# Patient Record
Sex: MALE | Race: WHITE | NOT HISPANIC OR LATINO | Employment: FULL TIME | URBAN - NONMETROPOLITAN AREA
[De-identification: names, ages, dates, MRNs, and addresses within clinical notes are randomized per-mention and may not be internally consistent; named-entity substitution may affect disease eponyms.]

---

## 2017-06-11 ENCOUNTER — APPOINTMENT (OUTPATIENT)
Dept: GENERAL RADIOLOGY | Facility: HOSPITAL | Age: 32
End: 2017-06-11

## 2017-06-11 ENCOUNTER — HOSPITAL ENCOUNTER (EMERGENCY)
Facility: HOSPITAL | Age: 32
Discharge: HOME | End: 2017-06-11
Attending: EMERGENCY MEDICINE | Admitting: EMERGENCY MEDICINE

## 2017-06-11 VITALS
WEIGHT: 285 LBS | TEMPERATURE: 97.7 F | BODY MASS INDEX: 38.6 KG/M2 | RESPIRATION RATE: 18 BRPM | DIASTOLIC BLOOD PRESSURE: 68 MMHG | HEART RATE: 64 BPM | OXYGEN SATURATION: 100 % | HEIGHT: 72 IN | SYSTOLIC BLOOD PRESSURE: 116 MMHG

## 2017-06-11 DIAGNOSIS — R07.9 CHEST PAIN, UNSPECIFIED TYPE: Primary | ICD-10-CM

## 2017-06-11 LAB
ALBUMIN SERPL-MCNC: 4.5 G/DL (ref 3.5–5)
ALBUMIN/GLOB SERPL: 1.2 G/DL (ref 1.5–2.5)
ALP SERPL-CCNC: 77 U/L (ref 40–129)
ALT SERPL W P-5'-P-CCNC: 51 U/L (ref 10–44)
AMYLASE SERPL-CCNC: 40 U/L (ref 28–100)
ANION GAP SERPL CALCULATED.3IONS-SCNC: 4 MMOL/L (ref 3.6–11.2)
AST SERPL-CCNC: 55 U/L (ref 10–34)
BASOPHILS # BLD AUTO: 0.07 10*3/MM3 (ref 0–0.3)
BASOPHILS NFR BLD AUTO: 0.9 % (ref 0–2)
BILIRUB SERPL-MCNC: 1.4 MG/DL (ref 0.2–1.8)
BILIRUB UR QL STRIP: NEGATIVE
BUN BLD-MCNC: 14 MG/DL (ref 7–21)
BUN/CREAT SERPL: 11.7 (ref 7–25)
CALCIUM SPEC-SCNC: 9.8 MG/DL (ref 7.7–10)
CHLORIDE SERPL-SCNC: 107 MMOL/L (ref 99–112)
CK MB SERPL-CCNC: 4.5 NG/ML (ref 0–5)
CK MB SERPL-CCNC: 5.1 NG/ML (ref 0–5)
CK MB SERPL-RTO: 1.6 % (ref 0–3)
CK MB SERPL-RTO: 1.8 % (ref 0–3)
CK SERPL-CCNC: 253 U/L (ref 24–204)
CK SERPL-CCNC: 324 U/L (ref 24–204)
CLARITY UR: CLEAR
CO2 SERPL-SCNC: 30 MMOL/L (ref 24.3–31.9)
COLOR UR: YELLOW
CREAT BLD-MCNC: 1.2 MG/DL (ref 0.43–1.29)
DEPRECATED RDW RBC AUTO: 42.1 FL (ref 37–54)
EOSINOPHIL # BLD AUTO: 0.19 10*3/MM3 (ref 0–0.7)
EOSINOPHIL NFR BLD AUTO: 2.4 % (ref 0–5)
ERYTHROCYTE [DISTWIDTH] IN BLOOD BY AUTOMATED COUNT: 13.1 % (ref 11.5–14.5)
GFR SERPL CREATININE-BSD FRML MDRD: 70 ML/MIN/1.73
GLOBULIN UR ELPH-MCNC: 3.8 GM/DL
GLUCOSE BLD-MCNC: 98 MG/DL (ref 70–110)
GLUCOSE UR STRIP-MCNC: NEGATIVE MG/DL
HCT VFR BLD AUTO: 42.7 % (ref 42–52)
HGB BLD-MCNC: 14.7 G/DL (ref 14–18)
HGB UR QL STRIP.AUTO: NEGATIVE
IMM GRANULOCYTES # BLD: 0.01 10*3/MM3 (ref 0–0.03)
IMM GRANULOCYTES NFR BLD: 0.1 % (ref 0–0.5)
KETONES UR QL STRIP: NEGATIVE
LEUKOCYTE ESTERASE UR QL STRIP.AUTO: NEGATIVE
LIPASE SERPL-CCNC: 45 U/L (ref 13–60)
LYMPHOCYTES # BLD AUTO: 2.04 10*3/MM3 (ref 1–3)
LYMPHOCYTES NFR BLD AUTO: 26.1 % (ref 21–51)
MCH RBC QN AUTO: 31 PG (ref 27–33)
MCHC RBC AUTO-ENTMCNC: 34.4 G/DL (ref 33–37)
MCV RBC AUTO: 90.1 FL (ref 80–94)
MONOCYTES # BLD AUTO: 0.8 10*3/MM3 (ref 0.1–0.9)
MONOCYTES NFR BLD AUTO: 10.2 % (ref 0–10)
MYOGLOBIN SERPL-MCNC: 55 NG/ML (ref 0–109)
MYOGLOBIN SERPL-MCNC: 79 NG/ML (ref 0–109)
NEUTROPHILS # BLD AUTO: 4.72 10*3/MM3 (ref 1.4–6.5)
NEUTROPHILS NFR BLD AUTO: 60.3 % (ref 30–70)
NITRITE UR QL STRIP: NEGATIVE
OSMOLALITY SERPL CALC.SUM OF ELEC: 281.7 MOSM/KG (ref 273–305)
PH UR STRIP.AUTO: 6.5 [PH] (ref 5–8)
PLATELET # BLD AUTO: 229 10*3/MM3 (ref 130–400)
PMV BLD AUTO: 11.4 FL (ref 6–10)
POTASSIUM BLD-SCNC: 4.8 MMOL/L (ref 3.5–5.3)
PROT SERPL-MCNC: 8.3 G/DL (ref 6–8)
PROT UR QL STRIP: NEGATIVE
RBC # BLD AUTO: 4.74 10*6/MM3 (ref 4.7–6.1)
SODIUM BLD-SCNC: 141 MMOL/L (ref 135–153)
SP GR UR STRIP: 1.01 (ref 1–1.03)
TROPONIN I SERPL-MCNC: <0.006 NG/ML
TROPONIN I SERPL-MCNC: <0.006 NG/ML
UROBILINOGEN UR QL STRIP: NORMAL
WBC NRBC COR # BLD: 7.83 10*3/MM3 (ref 4.5–12.5)

## 2017-06-11 PROCEDURE — 96361 HYDRATE IV INFUSION ADD-ON: CPT

## 2017-06-11 PROCEDURE — 71010 HC CHEST PA OR AP: CPT

## 2017-06-11 PROCEDURE — 93005 ELECTROCARDIOGRAM TRACING: CPT | Performed by: EMERGENCY MEDICINE

## 2017-06-11 PROCEDURE — 82150 ASSAY OF AMYLASE: CPT | Performed by: EMERGENCY MEDICINE

## 2017-06-11 PROCEDURE — 84484 ASSAY OF TROPONIN QUANT: CPT | Performed by: EMERGENCY MEDICINE

## 2017-06-11 PROCEDURE — 80053 COMPREHEN METABOLIC PANEL: CPT | Performed by: EMERGENCY MEDICINE

## 2017-06-11 PROCEDURE — 85025 COMPLETE CBC W/AUTO DIFF WBC: CPT | Performed by: EMERGENCY MEDICINE

## 2017-06-11 PROCEDURE — 71010 XR CHEST 1 VW: CPT | Performed by: RADIOLOGY

## 2017-06-11 PROCEDURE — 82550 ASSAY OF CK (CPK): CPT | Performed by: EMERGENCY MEDICINE

## 2017-06-11 PROCEDURE — 81003 URINALYSIS AUTO W/O SCOPE: CPT | Performed by: EMERGENCY MEDICINE

## 2017-06-11 PROCEDURE — 83874 ASSAY OF MYOGLOBIN: CPT | Performed by: EMERGENCY MEDICINE

## 2017-06-11 PROCEDURE — 82553 CREATINE MB FRACTION: CPT | Performed by: EMERGENCY MEDICINE

## 2017-06-11 PROCEDURE — 93010 ELECTROCARDIOGRAM REPORT: CPT | Performed by: INTERNAL MEDICINE

## 2017-06-11 PROCEDURE — 99284 EMERGENCY DEPT VISIT MOD MDM: CPT

## 2017-06-11 PROCEDURE — 83690 ASSAY OF LIPASE: CPT | Performed by: EMERGENCY MEDICINE

## 2017-06-11 PROCEDURE — 96360 HYDRATION IV INFUSION INIT: CPT

## 2017-06-11 RX ORDER — SODIUM CHLORIDE 0.9 % (FLUSH) 0.9 %
10 SYRINGE (ML) INJECTION AS NEEDED
Status: DISCONTINUED | OUTPATIENT
Start: 2017-06-11 | End: 2017-06-12 | Stop reason: HOSPADM

## 2017-06-11 RX ORDER — FAMOTIDINE 20 MG/1
20 TABLET, FILM COATED ORAL NIGHTLY
Qty: 30 TABLET | Refills: 0 | Status: SHIPPED | OUTPATIENT
Start: 2017-06-11 | End: 2017-07-13

## 2017-06-11 RX ORDER — SODIUM CHLORIDE 9 MG/ML
125 INJECTION, SOLUTION INTRAVENOUS CONTINUOUS
Status: DISCONTINUED | OUTPATIENT
Start: 2017-06-11 | End: 2017-06-11

## 2017-06-11 RX ADMIN — SODIUM CHLORIDE 500 ML: 9 INJECTION, SOLUTION INTRAVENOUS at 20:05

## 2017-06-11 NOTE — ED PROVIDER NOTES
Subjective   HPI Comments: Pt comes i with c/o chest pain on and off for the past two weeks.  Feels like pressure and heaviness.  Lasts anywhere from a few minutes to 2 hours.  Seems to be triggered by eating.  Pt has had some episodic SOA.    Patient is a 32 y.o. male presenting with chest pain.   History provided by:  Patient and spouse  Chest Pain   Pain location:  Substernal area and epigastric  Pain quality: pressure    Pain radiates to:  Does not radiate  Pain severity:  Moderate  Onset quality:  Gradual  Duration:  2 weeks  Timing:  Intermittent  Chronicity:  Recurrent  Context: eating    Context: not breathing, not drug use, not intercourse, not lifting, not movement, not raising an arm, not at rest, not stress and not trauma    Relieved by:  Nothing  Exacerbated by: Eating.  Ineffective treatments:  None tried  Associated symptoms: abdominal pain and shortness of breath    Associated symptoms: no AICD problem, no altered mental status, no anorexia, no anxiety, no back pain, no claudication, no cough, no diaphoresis, no dizziness, no dysphagia, no fatigue, no fever, no headache, no heartburn, no lower extremity edema, no nausea, no near-syncope, no numbness, no orthopnea, no palpitations, no PND, no syncope, no vomiting and no weakness    Risk factors: male sex    Risk factors: no aortic disease, no birth control, no coronary artery disease, no diabetes mellitus, no Nirav-Danlos syndrome, no high cholesterol, no hypertension, no Marfan's syndrome, not obese, not pregnant, no prior DVT/PE, no smoking and no surgery        Review of Systems   Constitutional: Negative.  Negative for diaphoresis, fatigue and fever.   HENT: Negative.  Negative for trouble swallowing.    Eyes: Negative.    Respiratory: Positive for chest tightness and shortness of breath. Negative for cough.    Cardiovascular: Positive for chest pain. Negative for palpitations, orthopnea, claudication, syncope, PND and near-syncope.    Gastrointestinal: Positive for abdominal pain. Negative for abdominal distention, anorexia, constipation, diarrhea, heartburn, nausea and vomiting.   Endocrine: Negative.    Genitourinary: Negative.    Musculoskeletal: Negative.  Negative for back pain.   Skin: Negative.    Allergic/Immunologic: Negative.    Neurological: Negative for dizziness, weakness, numbness and headaches.   Hematological: Negative.    Psychiatric/Behavioral: Negative.    All other systems reviewed and are negative.      History reviewed. No pertinent past medical history.    Allergies   Allergen Reactions   • Penicillins    • Phenergan [Promethazine Hcl]    • Vistaril [Hydroxyzine Hcl]        Past Surgical History:   Procedure Laterality Date   • APPENDECTOMY     • CHOLECYSTECTOMY         Family History   Problem Relation Age of Onset   • No Known Problems Mother    • No Known Problems Father    • No Known Problems Sister    • No Known Problems Brother    • No Known Problems Son    • No Known Problems Daughter    • No Known Problems Maternal Grandmother    • No Known Problems Maternal Grandfather    • No Known Problems Paternal Grandmother    • No Known Problems Paternal Grandfather    • No Known Problems Cousin    • Rheum arthritis Neg Hx    • Osteoarthritis Neg Hx    • Asthma Neg Hx    • Diabetes Neg Hx    • Heart failure Neg Hx    • Hyperlipidemia Neg Hx    • Hypertension Neg Hx    • Migraines Neg Hx    • Rashes / Skin problems Neg Hx    • Seizures Neg Hx    • Stroke Neg Hx    • Thyroid disease Neg Hx        Social History     Social History   • Marital status:      Spouse name: N/A   • Number of children: N/A   • Years of education: N/A     Social History Main Topics   • Smoking status: Never Smoker   • Smokeless tobacco: Never Used   • Alcohol use No   • Drug use: No   • Sexual activity: Defer     Other Topics Concern   • None     Social History Narrative   • None           Objective   Physical Exam   Constitutional: He is  oriented to person, place, and time. He appears well-developed and well-nourished. No distress.   HENT:   Head: Normocephalic.   Nose: Nose normal.   Mouth/Throat: Oropharynx is clear and moist.   Eyes: Conjunctivae and EOM are normal. Right eye exhibits no discharge. Left eye exhibits no discharge. No scleral icterus.   Neck: Normal range of motion. Neck supple. No tracheal deviation present. No thyromegaly present.   Cardiovascular: Normal rate, regular rhythm, normal heart sounds and intact distal pulses.  Exam reveals no gallop and no friction rub.    No murmur heard.  Pulmonary/Chest: Effort normal and breath sounds normal. No stridor. No respiratory distress. He has no wheezes. He has no rales. He exhibits no tenderness.   Abdominal: Soft. He exhibits no distension and no mass. There is no tenderness. There is no guarding.   Musculoskeletal: Normal range of motion. He exhibits no tenderness or deformity.   Lymphadenopathy:     He has no cervical adenopathy.   Neurological: He is alert and oriented to person, place, and time. He exhibits normal muscle tone. Coordination normal.   Skin: Skin is warm and dry. No rash noted. No pallor.   Psychiatric: He has a normal mood and affect. His behavior is normal. Judgment and thought content normal.   Nursing note and vitals reviewed.      Procedures         ED Course  ED Course   Value Comment By Time   ECG 12 Lead 12-lead EKG performed at 1914 hrs.  Normal sinus rhythm.  Normal EKG.  Ventricular rate 76.AZ interval 198.  QRS duration 94.  QTc 423.  No pathologic blocks.  No sustained dysrhythmia.  No acute ischemic ST segment deviation.  No criteria for STEMI. Rubin Garcia MD 06/11 2125    Hemodynamically stable.  No diagnostic EKG nor serial cardiac enzyme results. Rubin Garcia MD 06/11 7395            HEART Score  History: Slightly suspicious (+0)  ECG: Normal (+0)  Age: Less than 45 (+0)  Risk Factors: No risk factors known (+0)  Troponin: Normal  limit or lower (+0)  Total: 0       XR Chest 1 View   ED Interpretation   Single portable AP chest   My read   No acute infiltrate nor injury        Labs Reviewed   COMPREHENSIVE METABOLIC PANEL - Abnormal; Notable for the following:        Result Value    Total Protein 8.3 (*)     ALT (SGPT) 51 (*)     AST (SGOT) 55 (*)     A/G Ratio 1.2 (*)     All other components within normal limits   CK - Abnormal; Notable for the following:     Creatine Kinase 324 (*)     All other components within normal limits   CK MB - Abnormal; Notable for the following:     CKMB 5.10 (*)     All other components within normal limits   CBC WITH AUTO DIFFERENTIAL - Abnormal; Notable for the following:     MPV 11.4 (*)     Monocyte % 10.2 (*)     All other components within normal limits   CK - Abnormal; Notable for the following:     Creatine Kinase 253 (*)     All other components within normal limits   AMYLASE - Normal   LIPASE - Normal   URINALYSIS W/ CULTURE IF INDICATED - Normal    Narrative:     Urine microscopic not indicated.   MYOGLOBIN, SERUM - Normal   TROPONIN (IN-HOUSE) - Normal    Narrative:     Ultra Troponin I Reference Range:         <=0.039 ng/mL: Negative    0.04-0.779 ng/mL: Indeterminate Range. Suspicious of MI.  Clinical correlation required.       >=0.78  ng/mL: Consistent with myocardial injury.  Clinical correlation required.   OSMOLALITY, CALCULATED - Normal   CKMB INDEX CALCULATION - Normal   MYOGLOBIN, SERUM - Normal   CK MB - Normal   TROPONIN (IN-HOUSE) - Normal    Narrative:     Ultra Troponin I Reference Range:         <=0.039 ng/mL: Negative    0.04-0.779 ng/mL: Indeterminate Range. Suspicious of MI.  Clinical correlation required.       >=0.78  ng/mL: Consistent with myocardial injury.  Clinical correlation required.   CKMB INDEX CALCULATION - Normal   CBC AND DIFFERENTIAL    Narrative:     The following orders were created for panel order CBC & Differential.  Procedure                                Abnormality         Status                     ---------                               -----------         ------                     CBC Auto Differential[[id number]]        Abnormal            Final result                 Please view results for these tests on the individual orders.        Medication List      START taking these medications          esomeprazole 20 MG capsule   Commonly known as:  NEXIUM   Take 1 capsule by mouth Every Morning Before Breakfast.       famotidine 20 MG tablet   Commonly known as:  PEPCID   Take 1 tablet by mouth Every Night.           MDM  Number of Diagnoses or Management Options  Chest pain, unspecified type: new and requires workup     Amount and/or Complexity of Data Reviewed  Clinical lab tests: ordered and reviewed  Tests in the radiology section of CPT®: ordered and reviewed  Obtain history from someone other than the patient: yes  Independent visualization of images, tracings, or specimens: yes    Risk of Complications, Morbidity, and/or Mortality  Presenting problems: high  Diagnostic procedures: high  Management options: moderate    Patient Progress  Patient progress: stable      Final diagnoses:   Chest pain, unspecified type            Rubin Garcia MD  06/11/17 7364

## 2017-07-13 ENCOUNTER — CONSULT (OUTPATIENT)
Dept: GASTROENTEROLOGY | Facility: CLINIC | Age: 32
End: 2017-07-13

## 2017-07-13 VITALS
OXYGEN SATURATION: 98 % | HEART RATE: 64 BPM | SYSTOLIC BLOOD PRESSURE: 124 MMHG | HEIGHT: 72 IN | BODY MASS INDEX: 37.79 KG/M2 | WEIGHT: 279 LBS | DIASTOLIC BLOOD PRESSURE: 80 MMHG

## 2017-07-13 DIAGNOSIS — R07.9 CHEST PAIN, UNSPECIFIED TYPE: ICD-10-CM

## 2017-07-13 DIAGNOSIS — K62.5 RECTAL BLEEDING: ICD-10-CM

## 2017-07-13 DIAGNOSIS — R74.8 ABNORMAL AST AND ALT: ICD-10-CM

## 2017-07-13 DIAGNOSIS — K21.9 GASTROESOPHAGEAL REFLUX DISEASE, ESOPHAGITIS PRESENCE NOT SPECIFIED: Primary | ICD-10-CM

## 2017-07-13 DIAGNOSIS — R13.10 DYSPHAGIA, UNSPECIFIED TYPE: ICD-10-CM

## 2017-07-13 DIAGNOSIS — R19.7 DIARRHEA, UNSPECIFIED TYPE: ICD-10-CM

## 2017-07-13 PROCEDURE — 99244 OFF/OP CNSLTJ NEW/EST MOD 40: CPT | Performed by: PHYSICIAN ASSISTANT

## 2017-07-13 RX ORDER — PANTOPRAZOLE SODIUM 40 MG/1
40 TABLET, DELAYED RELEASE ORAL DAILY
COMMUNITY
End: 2017-07-13 | Stop reason: SDUPTHER

## 2017-07-13 RX ORDER — PANTOPRAZOLE SODIUM 40 MG/1
40 TABLET, DELAYED RELEASE ORAL DAILY
Qty: 30 TABLET | Refills: 3 | Status: SHIPPED | OUTPATIENT
Start: 2017-07-13 | End: 2017-12-05

## 2017-07-13 NOTE — PROGRESS NOTES
"Chief Complaint   Patient presents with   • Chest Pain     Amos Griffin is a 32 y.o. male who presents to the office today as a consultation at the request of KARI Mcgowan for evaluation of Chest Pain.    HPI  He reports always having \"stomach trouble\" but over the past 6 weeks, he has had progressively worsening chest pressure. He also has heartburn and dysphagia at times. Food seems to get stuck at times and even without eating he has the sensation in his chest. He has never had an EGD. He is taking protonix 40 mg once daily and Carafate suspension 4 times per day. He has noticed with low carb, high protein diet that his acid reflux has gotten worse. Denies abdominal pain, nausea or vomiting. Chest pressure seems to be worse with spicy foods or barbeque.     Bowel movements are occurring 4 times per day and are described as soft and points to #5 or 6 on the [place] Stool Scale. This has been present for several years and has gotten worse since his cholecystectomy about 1 year ago. Rectal bleeding has been intermittent for year and has changed the color of the water at times. Rectal bleeding occurs 6-7 times per year. Denies melena. Appetite is good and weight is stable.      There is no known family history of colon cancer or colon polyps. He has never had a colonoscopy. Celiac disease is on his mother's side of the family per his wife's report.     6/11/2017:  ALT (SGPT) 10 - 44 U/L 51 (H)   AST (SGOT) 10 - 34 U/L 55 (H)       Review of Systems   Constitutional: Negative.    HENT: Negative.    Eyes: Negative.    Respiratory: Positive for chest tightness.    Cardiovascular: Positive for chest pain.   Gastrointestinal: Negative for abdominal distention, abdominal pain, anal bleeding, blood in stool, constipation, diarrhea, nausea, rectal pain and vomiting.   Endocrine: Negative.    Genitourinary: Negative.    Musculoskeletal: Negative.    Skin: Negative.    Allergic/Immunologic: Negative.  " "  Neurological: Negative.    Hematological: Negative.    Psychiatric/Behavioral: Negative.      PAST MEDICAL HISTORY:  History reviewed. No pertinent past medical history.      Past Surgical History:   Procedure Laterality Date   • APPENDECTOMY     • CHOLECYSTECTOMY     • KNEE SURGERY         Family History   Problem Relation Age of Onset   • No Known Problems Mother    • No Known Problems Father    • No Known Problems Sister    • No Known Problems Brother    • No Known Problems Son    • No Known Problems Daughter    • No Known Problems Maternal Grandmother    • No Known Problems Maternal Grandfather    • No Known Problems Paternal Grandmother    • No Known Problems Paternal Grandfather    • No Known Problems Cousin    • Rheum arthritis Neg Hx    • Osteoarthritis Neg Hx    • Asthma Neg Hx    • Diabetes Neg Hx    • Heart failure Neg Hx    • Hyperlipidemia Neg Hx    • Hypertension Neg Hx    • Migraines Neg Hx    • Rashes / Skin problems Neg Hx    • Seizures Neg Hx    • Stroke Neg Hx    • Thyroid disease Neg Hx      Social History   Substance Use Topics   • Smoking status: Never Smoker   • Smokeless tobacco: Never Used   • Alcohol use No       CURRENT MEDICATION:  •  pantoprazole (PROTONIX) 40 MG EC tablet, Take 40 mg by mouth Daily., Disp: , Rfl:   •  sucralfate (CARAFATE) 1 GM/10ML suspension, Take 10 mL by mouth 1 hour before meals and at bedtime. (4 times daily), Disp: 1200 mL, Rfl: 0    ALLERGIES:  Penicillins; Phenergan [promethazine hcl]; and Vistaril [hydroxyzine hcl]    VISIT VITALS:  /80  Pulse 64  Ht 72\" (182.9 cm)  Wt 279 lb (127 kg)  SpO2 98%  BMI 37.84 kg/m2    Physical Exam   Constitutional: He is oriented to person, place, and time. He appears well-developed and well-nourished. No distress.   HENT:   Head: Normocephalic and atraumatic.   Right Ear: External ear normal.   Left Ear: External ear normal.   Nose: Nose normal.   Mouth/Throat: Oropharynx is clear and moist.   Eyes: Conjunctivae and " EOM are normal. Right eye exhibits no discharge. Left eye exhibits no discharge. No scleral icterus.   Neck: Normal range of motion. Neck supple.   Cardiovascular: Normal rate, regular rhythm and normal heart sounds.  Exam reveals no gallop and no friction rub.    No murmur heard.  Pulmonary/Chest: Effort normal and breath sounds normal. No respiratory distress. He has no wheezes. He has no rales. He exhibits no tenderness.   Abdominal: Soft. Normal appearance and bowel sounds are normal. He exhibits no distension, no ascites and no mass. There is no tenderness. There is no rigidity and no guarding. No hernia.   Musculoskeletal: Normal range of motion. He exhibits no edema or deformity.   Neurological: He is alert and oriented to person, place, and time. He exhibits normal muscle tone. Coordination normal.   Skin: Skin is warm and dry. No rash noted. No erythema. No pallor.   Psychiatric: He has a normal mood and affect. His behavior is normal. Judgment and thought content normal.   Nursing note and vitals reviewed.      Assessment/Plan     1. Gastroesophageal reflux disease, esophagitis presence not specified    2. Chest pain, unspecified type    3. Diarrhea, unspecified type    4. Rectal bleeding    5. Dysphagia, unspecified type    6. Abnormal AST and ALT      For GERD, I have refilled Protonix 40 mg once daily 30 minutes before a meal. He was instructed not to lie down immediately after eating (wait at least 3 hours after meals), elevate the head of the bed at night, avoid spicy foods, avoid mints, avoid caffeine, avoid nicotine and work on getting to a healthy weight.     He will need an esophagogastroduodenoscopy and colonoscopy performed with IV general sedation due to current GI complaints. All of the risks, benefits and alternatives of this procedure have been discussed with him, all of his questions have been answered and he has elected to proceed. He should follow up in the office after this procedure to  discuss the results and further recommendations can be made at that time.    Abnormal AST and ALT were noted on recent labs. He will be tested for the common liver diagnoses including; Alpha 1 antitrypsin deficiency, autoimmune hepatitis, primary biliary cirrhosis, Torres's disease, hereditary hemochromatosis, celiac diease, hepatitis B, and hepatitis C with reflex genotype. Ultrasound of the liver will also be performed.              Return for next scheduled follow up after procedure.       Rita Riojas PA-C       Electronically signed 7/27/2017 at 9:36 AM.

## 2017-07-27 ENCOUNTER — HOSPITAL ENCOUNTER (OUTPATIENT)
Dept: ULTRASOUND IMAGING | Facility: HOSPITAL | Age: 32
Discharge: HOME | End: 2017-07-27
Admitting: PHYSICIAN ASSISTANT

## 2017-07-27 ENCOUNTER — LAB (OUTPATIENT)
Dept: LAB | Facility: HOSPITAL | Age: 32
End: 2017-07-27

## 2017-07-27 DIAGNOSIS — R74.8 ABNORMAL AST AND ALT: ICD-10-CM

## 2017-07-27 LAB
FERRITIN SERPL-MCNC: 365 NG/ML (ref 21.9–321.7)
HBV SURFACE AB SER RIA-ACNC: NORMAL
HBV SURFACE AG SERPL QL IA: NORMAL
IRON 24H UR-MRATE: 140 MCG/DL (ref 53–167)
IRON SATN MFR SERPL: 49 % (ref 20–50)
TIBC SERPL-MCNC: 288 MCG/DL (ref 241–421)

## 2017-07-27 PROCEDURE — 86706 HEP B SURFACE ANTIBODY: CPT | Performed by: PHYSICIAN ASSISTANT

## 2017-07-27 PROCEDURE — 87340 HEPATITIS B SURFACE AG IA: CPT | Performed by: PHYSICIAN ASSISTANT

## 2017-07-27 PROCEDURE — 82103 ALPHA-1-ANTITRYPSIN TOTAL: CPT | Performed by: PHYSICIAN ASSISTANT

## 2017-07-27 PROCEDURE — 87350 HEPATITIS BE AG IA: CPT | Performed by: PHYSICIAN ASSISTANT

## 2017-07-27 PROCEDURE — 82390 ASSAY OF CERULOPLASMIN: CPT | Performed by: PHYSICIAN ASSISTANT

## 2017-07-27 PROCEDURE — 82728 ASSAY OF FERRITIN: CPT | Performed by: PHYSICIAN ASSISTANT

## 2017-07-27 PROCEDURE — 76705 ECHO EXAM OF ABDOMEN: CPT | Performed by: RADIOLOGY

## 2017-07-27 PROCEDURE — 76705 ECHO EXAM OF ABDOMEN: CPT

## 2017-07-27 PROCEDURE — 86708 HEPATITIS A ANTIBODY: CPT | Performed by: PHYSICIAN ASSISTANT

## 2017-07-27 PROCEDURE — 83516 IMMUNOASSAY NONANTIBODY: CPT | Performed by: PHYSICIAN ASSISTANT

## 2017-07-27 PROCEDURE — 36415 COLL VENOUS BLD VENIPUNCTURE: CPT

## 2017-07-27 PROCEDURE — 82104 ALPHA-1-ANTITRYPSIN PHENO: CPT | Performed by: PHYSICIAN ASSISTANT

## 2017-07-27 PROCEDURE — 86038 ANTINUCLEAR ANTIBODIES: CPT | Performed by: PHYSICIAN ASSISTANT

## 2017-07-27 PROCEDURE — 83540 ASSAY OF IRON: CPT | Performed by: PHYSICIAN ASSISTANT

## 2017-07-27 PROCEDURE — 83550 IRON BINDING TEST: CPT | Performed by: PHYSICIAN ASSISTANT

## 2017-07-27 PROCEDURE — 87522 HEPATITIS C REVRS TRNSCRPJ: CPT | Performed by: PHYSICIAN ASSISTANT

## 2017-07-28 LAB — HAV AB SER QL IA: NEGATIVE

## 2017-07-29 LAB
HCV GENTYP SERPL NAA+PROBE: NORMAL
HCV RNA SERPL NAA+PROBE-ACNC: NORMAL IU/ML
HCV RNA SERPL NAA+PROBE-LOG IU: NORMAL LOG10 IU/ML
REF LAB TEST REF RANGE: NORMAL

## 2017-07-31 ENCOUNTER — TELEPHONE (OUTPATIENT)
Dept: GASTROENTEROLOGY | Facility: CLINIC | Age: 32
End: 2017-07-31

## 2017-08-01 LAB
A1AT SERPL-MCNC: 123 MG/DL (ref 90–200)
ACTIN IGG SERPL-ACNC: 11 UNITS (ref 0–19)
ANA SER QL IA: NEGATIVE
CERULOPLASMIN SERPL-MCNC: 18.2 MG/DL (ref 16–31)
DEPRECATED MITOCHONDRIA M2 IGG SER-ACNC: <20 UNITS (ref 0–20)
ENDOMYSIUM IGA SER QL: NEGATIVE
GLIADIN PEPTIDE IGA SER-ACNC: 11 UNITS (ref 0–19)
GLIADIN PEPTIDE IGG SER-ACNC: 2 UNITS (ref 0–19)
HBV E AG SERPL QL IA: NEGATIVE
IGA SERPL-MCNC: 357 MG/DL (ref 90–386)
PHENOTYPE: NORMAL
TTG IGA SER-ACNC: <2 U/ML (ref 0–3)
TTG IGG SER-ACNC: 2 U/ML (ref 0–5)

## 2017-09-26 ENCOUNTER — ANESTHESIA EVENT (OUTPATIENT)
Dept: PERIOP | Facility: HOSPITAL | Age: 32
End: 2017-09-26

## 2017-09-26 ENCOUNTER — HOSPITAL ENCOUNTER (OUTPATIENT)
Facility: HOSPITAL | Age: 32
Setting detail: HOSPITAL OUTPATIENT SURGERY
Discharge: HOME | End: 2017-09-26
Attending: INTERNAL MEDICINE | Admitting: INTERNAL MEDICINE

## 2017-09-26 ENCOUNTER — ANESTHESIA (OUTPATIENT)
Dept: PERIOP | Facility: HOSPITAL | Age: 32
End: 2017-09-26

## 2017-09-26 VITALS
WEIGHT: 280 LBS | SYSTOLIC BLOOD PRESSURE: 116 MMHG | RESPIRATION RATE: 20 BRPM | TEMPERATURE: 98 F | OXYGEN SATURATION: 94 % | BODY MASS INDEX: 37.93 KG/M2 | DIASTOLIC BLOOD PRESSURE: 69 MMHG | HEIGHT: 72 IN | HEART RATE: 70 BPM

## 2017-09-26 DIAGNOSIS — R13.10 DYSPHAGIA, UNSPECIFIED TYPE: ICD-10-CM

## 2017-09-26 DIAGNOSIS — K21.9 GASTROESOPHAGEAL REFLUX DISEASE, ESOPHAGITIS PRESENCE NOT SPECIFIED: ICD-10-CM

## 2017-09-26 DIAGNOSIS — R07.9 CHEST PAIN, UNSPECIFIED TYPE: ICD-10-CM

## 2017-09-26 DIAGNOSIS — K62.5 RECTAL BLEEDING: ICD-10-CM

## 2017-09-26 DIAGNOSIS — R19.7 DIARRHEA, UNSPECIFIED TYPE: ICD-10-CM

## 2017-09-26 LAB
027 TOXIN: NORMAL
C DIFF TOX GENS STL QL NAA+PROBE: NEGATIVE

## 2017-09-26 PROCEDURE — 87177 OVA AND PARASITES SMEARS: CPT | Performed by: INTERNAL MEDICINE

## 2017-09-26 PROCEDURE — 43248 EGD GUIDE WIRE INSERTION: CPT | Performed by: INTERNAL MEDICINE

## 2017-09-26 PROCEDURE — 25010000002 FENTANYL CITRATE (PF) 100 MCG/2ML SOLUTION: Performed by: NURSE ANESTHETIST, CERTIFIED REGISTERED

## 2017-09-26 PROCEDURE — 45380 COLONOSCOPY AND BIOPSY: CPT | Performed by: INTERNAL MEDICINE

## 2017-09-26 PROCEDURE — 25010000002 PROPOFOL 1000 MG/ML EMULSION: Performed by: NURSE ANESTHETIST, CERTIFIED REGISTERED

## 2017-09-26 PROCEDURE — 25010000002 PROPOFOL 10 MG/ML EMULSION: Performed by: NURSE ANESTHETIST, CERTIFIED REGISTERED

## 2017-09-26 PROCEDURE — 87493 C DIFF AMPLIFIED PROBE: CPT | Performed by: INTERNAL MEDICINE

## 2017-09-26 PROCEDURE — 87209 SMEAR COMPLEX STAIN: CPT | Performed by: INTERNAL MEDICINE

## 2017-09-26 PROCEDURE — 25010000002 MIDAZOLAM PER 1 MG: Performed by: NURSE ANESTHETIST, CERTIFIED REGISTERED

## 2017-09-26 PROCEDURE — 43239 EGD BIOPSY SINGLE/MULTIPLE: CPT | Performed by: INTERNAL MEDICINE

## 2017-09-26 RX ORDER — ONDANSETRON 2 MG/ML
4 INJECTION INTRAMUSCULAR; INTRAVENOUS ONCE AS NEEDED
Status: DISCONTINUED | OUTPATIENT
Start: 2017-09-26 | End: 2017-09-26 | Stop reason: HOSPADM

## 2017-09-26 RX ORDER — FENTANYL CITRATE 50 UG/ML
INJECTION, SOLUTION INTRAMUSCULAR; INTRAVENOUS AS NEEDED
Status: DISCONTINUED | OUTPATIENT
Start: 2017-09-26 | End: 2017-09-26 | Stop reason: SURG

## 2017-09-26 RX ORDER — SODIUM CHLORIDE, SODIUM LACTATE, POTASSIUM CHLORIDE, CALCIUM CHLORIDE 600; 310; 30; 20 MG/100ML; MG/100ML; MG/100ML; MG/100ML
125 INJECTION, SOLUTION INTRAVENOUS CONTINUOUS
Status: DISCONTINUED | OUTPATIENT
Start: 2017-09-26 | End: 2017-09-26 | Stop reason: HOSPADM

## 2017-09-26 RX ORDER — SODIUM CHLORIDE 0.9 % (FLUSH) 0.9 %
1-10 SYRINGE (ML) INJECTION AS NEEDED
Status: DISCONTINUED | OUTPATIENT
Start: 2017-09-26 | End: 2017-09-26 | Stop reason: HOSPADM

## 2017-09-26 RX ORDER — FENTANYL CITRATE 50 UG/ML
50 INJECTION, SOLUTION INTRAMUSCULAR; INTRAVENOUS
Status: DISCONTINUED | OUTPATIENT
Start: 2017-09-26 | End: 2017-09-26 | Stop reason: HOSPADM

## 2017-09-26 RX ORDER — MIDAZOLAM HYDROCHLORIDE 1 MG/ML
INJECTION INTRAMUSCULAR; INTRAVENOUS AS NEEDED
Status: DISCONTINUED | OUTPATIENT
Start: 2017-09-26 | End: 2017-09-26 | Stop reason: SURG

## 2017-09-26 RX ORDER — PROPOFOL 10 MG/ML
VIAL (ML) INTRAVENOUS AS NEEDED
Status: DISCONTINUED | OUTPATIENT
Start: 2017-09-26 | End: 2017-09-26 | Stop reason: SURG

## 2017-09-26 RX ORDER — MEPERIDINE HYDROCHLORIDE 25 MG/ML
12.5 INJECTION INTRAMUSCULAR; INTRAVENOUS; SUBCUTANEOUS
Status: DISCONTINUED | OUTPATIENT
Start: 2017-09-26 | End: 2017-09-26 | Stop reason: HOSPADM

## 2017-09-26 RX ORDER — IPRATROPIUM BROMIDE AND ALBUTEROL SULFATE 2.5; .5 MG/3ML; MG/3ML
3 SOLUTION RESPIRATORY (INHALATION) ONCE AS NEEDED
Status: DISCONTINUED | OUTPATIENT
Start: 2017-09-26 | End: 2017-09-26 | Stop reason: HOSPADM

## 2017-09-26 RX ORDER — LIDOCAINE HYDROCHLORIDE 20 MG/ML
INJECTION, SOLUTION INFILTRATION; PERINEURAL AS NEEDED
Status: DISCONTINUED | OUTPATIENT
Start: 2017-09-26 | End: 2017-09-26 | Stop reason: SURG

## 2017-09-26 RX ORDER — OXYCODONE HYDROCHLORIDE AND ACETAMINOPHEN 5; 325 MG/1; MG/1
1 TABLET ORAL ONCE AS NEEDED
Status: DISCONTINUED | OUTPATIENT
Start: 2017-09-26 | End: 2017-09-26 | Stop reason: HOSPADM

## 2017-09-26 RX ADMIN — PROPOFOL 30 MG: 10 INJECTION, EMULSION INTRAVENOUS at 11:28

## 2017-09-26 RX ADMIN — SODIUM CHLORIDE, POTASSIUM CHLORIDE, SODIUM LACTATE AND CALCIUM CHLORIDE: 600; 310; 30; 20 INJECTION, SOLUTION INTRAVENOUS at 11:25

## 2017-09-26 RX ADMIN — PROPOFOL 200 MCG/KG/MIN: 10 INJECTION, EMULSION INTRAVENOUS at 11:28

## 2017-09-26 RX ADMIN — LIDOCAINE HYDROCHLORIDE 40 MG: 20 INJECTION, SOLUTION INFILTRATION; PERINEURAL at 11:28

## 2017-09-26 RX ADMIN — FENTANYL CITRATE 100 MCG: 50 INJECTION INTRAMUSCULAR; INTRAVENOUS at 11:25

## 2017-09-26 RX ADMIN — MIDAZOLAM HYDROCHLORIDE 2 MG: 1 INJECTION, SOLUTION INTRAMUSCULAR; INTRAVENOUS at 11:25

## 2017-09-26 NOTE — H&P
History and physical examination  September 26, 2017    HPI  32-year-old white male presents for EGD and colonoscopy in order to investigate GERD symptoms, dysphagia, diarrhea, rectal bleeding.      Review of Systems   Negative and noncontributory.    ACTIVE PROBLEMS:   Specialty Problems     None          PAST MEDICAL HISTORY:  History reviewed. No pertinent past medical history.    SURGICAL HISTORY:  Past Surgical History:   Procedure Laterality Date   • APPENDECTOMY     • CHOLECYSTECTOMY     • KNEE SURGERY         FAMILY HISTORY:  Family History   Problem Relation Age of Onset   • No Known Problems Mother    • No Known Problems Father    • No Known Problems Sister    • No Known Problems Brother    • No Known Problems Son    • No Known Problems Daughter    • No Known Problems Maternal Grandmother    • No Known Problems Maternal Grandfather    • No Known Problems Paternal Grandmother    • No Known Problems Paternal Grandfather    • No Known Problems Cousin    • Rheum arthritis Neg Hx    • Osteoarthritis Neg Hx    • Asthma Neg Hx    • Diabetes Neg Hx    • Heart failure Neg Hx    • Hyperlipidemia Neg Hx    • Hypertension Neg Hx    • Migraines Neg Hx    • Rashes / Skin problems Neg Hx    • Seizures Neg Hx    • Stroke Neg Hx    • Thyroid disease Neg Hx        SOCIAL HISTORY:  Social History   Substance Use Topics   • Smoking status: Former Smoker     Quit date: 04/2017   • Smokeless tobacco: Never Used   • Alcohol use No       CURRENT MEDICATION:    Current Facility-Administered Medications:   •  lactated ringers infusion, 125 mL/hr, Intravenous, Continuous, Misael Villanueva MD  •  sodium chloride 0.9 % flush 1-10 mL, 1-10 mL, Intravenous, PRN, Misael Villanueva MD     I have reviewed his list of current medications.    ALLERGIES:  Penicillins; Phenergan [promethazine hcl]; and Vistaril [hydroxyzine hcl]    VISIT VITALS:  /72 (BP Location: Left arm, Patient Position: Sitting)  Pulse 71  Temp 97.8 °F (36.6 °C)  "(Tympanic)   Resp 20  Ht 72\" (182.9 cm)  Wt 280 lb (127 kg)  SpO2 100%  BMI 37.97 kg/m2    PHYSICAL EXAMINATION:  Physical Exam   Alert, oriented ×3  HEENT: Normal  Neck: No mass  Chest: Clear  Heart: Regular rhythm  Abdomen: Soft, nontender, active BS  Extremities: No edema  Neuro: No focal deficit    Assessment/Plan   GERD  Dysphagia  Diarrhea  Rectal bleeding  Colon cancer screening    REC  EGD and screening colonoscopy are planned.  Procedures, benefits, risks and alternatives were discussed with the patient.         Clinton Bernal III, MD  "

## 2017-09-26 NOTE — PLAN OF CARE
Problem: GI Endoscopy (Adult)  Goal: Signs and Symptoms of Listed Potential Problems Will be Absent or Manageable (GI Endoscopy)  Outcome: Ongoing (interventions implemented as appropriate)    09/26/17 1132   GI Endoscopy   Problems Assessed (GI Endoscopy) all   Problems Present (GI Endoscopy) none

## 2017-09-26 NOTE — ANESTHESIA PREPROCEDURE EVALUATION
Anesthesia Evaluation     Patient summary reviewed and Nursing notes reviewed   no history of anesthetic complications:  NPO Solid Status: > 8 hours  NPO Liquid Status: > 8 hours     Airway   Mallampati: II  TM distance: >3 FB  Neck ROM: full  no difficulty expected  Dental - normal exam     Pulmonary - normal exam   (+) a smoker Former, sleep apnea,   (-) asthma  Cardiovascular - negative cardio ROS and normal exam  Exercise tolerance: good (4-7 METS)    NYHA Classification: II    (-) hypertension, past MI, dysrhythmias, angina, CHF, hyperlipidemia      Neuro/Psych- negative ROS  (-) seizures, CVA  GI/Hepatic/Renal/Endo    (+) obesity,  GERD,   (-) liver disease, no renal disease, diabetes, hypothyroidism    Musculoskeletal (-) negative ROS    Abdominal  - normal exam    Bowel sounds: normal.   Substance History - negative use     OB/GYN negative ob/gyn ROS         Other - negative ROS       (-) arthritis                                  Anesthesia Plan    ASA 2     general     intravenous induction   Anesthetic plan and risks discussed with patient and spouse/significant other.  Use of blood products discussed with patient and spouse/significant other  Consented to blood products.

## 2017-09-26 NOTE — OP NOTE
09/26/17    ESOPHAGOGASTRODUODENOSCOPY WITH BIOPSIES AND DILATATION, COLONOSCOPY with biopsies and stool aspirate  Procedure Note    Amos Griffin  9/26/2017    Pre-op Diagnosis: GERD symptoms, dysphagia, diarrhea, rectal bleeding, colon cancer screening, no prior colonoscopy      Post-op Diagnosis:   Normal EGD  Small internal hemorrhoids, otherwise negative colonoscopy        Anesthesia: Per Anesthesia service, general anesthesia      Estimated Blood Loss: Negligible      Findings: EGD was performed with careful inspection of the esophagus, stomach and duodenum to the fourth portion.  All areas appeared normal.  Biopsies were taken randomly from the esophagus, gastric antrum and duodenum.  The esophagus was dilated using a 48 Romanian Savary dilator.  Re-examination of the esophagus, using the endoscope, revealed no significant injury to the esophagus.    Normal rectal examination.  Colonoscopy performed to the terminal ileum.  Bowel preparation was satisfactory.  The scope was retroflexed within the rectum.  All areas were examined carefully.  Small internal hemorrhoids were noted.  No other abnormality was seen, including visualization of the terminal ileum.  Biopsies were taken randomly from the colon and TI.  Stool sample was collected for ova and parasites, Clostridium difficile toxin.    He rated the procedures well and there were no complications.  He left the OR in stable and satisfactory condition.      Complications: None      Recommendations:   Findings were discussed with the patient  Await biopsy findings  Continue PPI treatment  Repeat screening/surveillance colonoscopy at age 50      Clinton Bernal III, MD     Date: 9/26/2017  Time: 11:50 AM     CC: Tami PIERCE

## 2017-09-26 NOTE — DISCHARGE INSTRUCTIONS
General Anesthesia, Adult, Care After  Refer to this sheet in the next few weeks. These instructions provide you with information on caring for yourself after your procedure. Your health care provider may also give you more specific instructions. Your treatment has been planned according to current medical practices, but problems sometimes occur. Call your health care provider if you have any problems or questions after your procedure.  WHAT TO EXPECT AFTER THE PROCEDURE  After the procedure, it is typical to experience:  · Sleepiness.  · Nausea and vomiting.  HOME CARE INSTRUCTIONS  · For the first 24 hours after general anesthesia:  ¨ Have a responsible person with you.  ¨ Do not drive a car. If you are alone, do not take public transportation.  ¨ Do not drink alcohol.  ¨ Do not take medicine that has not been prescribed by your health care provider.  ¨ Do not sign important papers or make important decisions.  ¨ You may resume a normal diet and activities as directed by your health care provider.  · Change bandages (dressings) as directed.  · If you have questions or problems that seem related to general anesthesia, call the hospital and ask for the anesthetist or anesthesiologist on call.  SEEK MEDICAL CARE IF:  · You have nausea and vomiting that continue the day after anesthesia.  · You develop a rash.  SEEK IMMEDIATE MEDICAL CARE IF:    · You have difficulty breathing.  · You have chest pain.  · You have any allergic problems.  This information is not intended to replace advice given to you by your health care provider. Make sure you discuss any questions you have with your health care provider.  Document Released: 03/26/2002 Document Revised: 01/08/2016 Document Reviewed: 07/03/2014  [employer] Interactive Patient Education ©2016 [employer]    Esophagogastroduodenoscopy, Care After  Refer to this sheet in the next few weeks. These instructions provide you with information about caring for yourself after  your procedure. Your health care provider may also give you more specific instructions. Your treatment has been planned according to current medical practices, but problems sometimes occur. Call your health care provider if you have any problems or questions after your procedure.  WHAT TO EXPECT AFTER THE PROCEDURE  After your procedure, it is typical to feel:  · Soreness in your throat.  · Pain with swallowing.  · Sick to your stomach (nauseous).  · Bloated.  · Dizzy.  · Fatigued.  HOME CARE INSTRUCTIONS  · Do not eat or drink anything until the numbing medicine (local anesthetic) has worn off and your gag reflex has returned. You will know that the local anesthetic has worn off when you can swallow comfortably.  · Do not drive or operate machinery until directed by your health care provider.  · Take medicines only as directed by your health care provider.  SEEK MEDICAL CARE IF:    · You cannot stop coughing.  · You are not urinating at all or less than usual.  SEEK IMMEDIATE MEDICAL CARE IF:  · You have difficulty swallowing.  · You cannot eat or drink.  · You have worsening throat or chest pain.  · You have dizziness or lightheadedness or you faint.  · You have nausea or vomiting.  · You have chills.  · You have a fever.  · You have severe abdominal pain.  · You have black, tarry, or bloody stools.  This information is not intended to replace advice given to you by your health care provider. Make sure you discuss any questions you have with your health care provider.  Document Released: 12/04/2013 Document Revised: 01/08/2016 Document Reviewed: 12/04/2013  [employer] Interactive Patient Education ©2016 [employer]    Colonoscopy, Care After  Refer to this sheet in the next few weeks. These instructions provide you with information on caring for yourself after your procedure. Your health care provider may also give you more specific instructions. Your treatment has been planned according to current medical  practices, but problems sometimes occur. Call your health care provider if you have any problems or questions after your procedure.  WHAT TO EXPECT AFTER THE PROCEDURE    After your procedure, it is typical to have the following:  · A small amount of blood in your stool.  · Moderate amounts of gas and mild abdominal cramping or bloating.  HOME CARE INSTRUCTIONS  · Do not drive, operate machinery, or sign important documents for 24 hours.  · You may shower and resume your regular physical activities, but move at a slower pace for the first 24 hours.  · Take frequent rest periods for the first 24 hours.  · Walk around or put a warm pack on your abdomen to help reduce abdominal cramping and bloating.  · Drink enough fluids to keep your urine clear or pale yellow.  · You may resume your normal diet as instructed by your health care provider. Avoid heavy or fried foods that are hard to digest.  · Avoid drinking alcohol for 24 hours or as instructed by your health care provider.  · Only take over-the-counter or prescription medicines as directed by your health care provider.  · If a tissue sample (biopsy) was taken during your procedure:  ¨ Do not take aspirin or blood thinners for 7 days, or as instructed by your health care provider.  ¨ Do not drink alcohol for 7 days, or as instructed by your health care provider.  ¨ Eat soft foods for the first 24 hours.  SEEK MEDICAL CARE IF:  You have persistent spotting of blood in your stool 2-3 days after the procedure.  SEEK IMMEDIATE MEDICAL CARE IF:  · You have more than a small spotting of blood in your stool.  · You pass large blood clots in your stool.  · Your abdomen is swollen (distended).  · You have nausea or vomiting.  · You have a fever.  · You have increasing abdominal pain that is not relieved with medicine.  This information is not intended to replace advice given to you by your health care provider. Make sure you discuss any questions you have with your health  care provider.  Document Released: 08/01/2005 Document Revised: 10/08/2014 Document Reviewed: 08/25/2014  [employer] Interactive Patient Education ©2016 [employer]

## 2017-09-26 NOTE — ANESTHESIA POSTPROCEDURE EVALUATION
Patient: Amos Griffin    Procedure Summary     Date Anesthesia Start Anesthesia Stop Room / Location    09/26/17 1125 1150 [facility] / [facility]       Procedure Diagnosis Surgeon Provider    ESOPHAGOGASTRODUODENOSCOPY WITH DILATATION CPT CODE: 83174 (N/A Esophagus); COLONOSCOPY CPT CODE: 38013 (N/A ) Rectal bleeding; Gastroesophageal reflux disease, esophagitis presence not specified; Dysphagia, unspecified type; Chest pain, unspecified type; Diarrhea, unspecified type  (Rectal bleeding [K62.5]; Gastroesophageal reflux disease, esophagitis presence not specified [K21.9]; Dysphagia, unspecified type [R13.10]; Chest pain, unspecified type [R07.9]; Diarrhea, unspecified type [R19.7]) MD Misael Barraza III, MD          Anesthesia Type: general  Last vitals  BP   104/52 (09/26/17 1155)    Temp   98 °F (36.7 °C) (09/26/17 1150)    Pulse   65 (09/26/17 1155)   Resp   20 (09/26/17 1155)    SpO2   98 % (09/26/17 1155)      Post Anesthesia Care and Evaluation    Patient location during evaluation: PHASE II  Patient participation: complete - patient participated  Level of consciousness: awake and alert  Pain score: 1  Pain management: adequate  Airway patency: patent  Anesthetic complications: No anesthetic complications  PONV Status: controlled  Cardiovascular status: acceptable  Respiratory status: acceptable  Hydration status: acceptable

## 2017-09-27 LAB
LAB AP CASE REPORT: NORMAL
Lab: NORMAL
PATH REPORT.FINAL DX SPEC: NORMAL

## 2017-09-29 LAB
O+P SPEC MICRO: NORMAL
OVA + PARASITE RESULT 1: NORMAL

## 2017-10-04 ENCOUNTER — OFFICE VISIT (OUTPATIENT)
Dept: GASTROENTEROLOGY | Facility: CLINIC | Age: 32
End: 2017-10-04

## 2017-10-04 VITALS
HEIGHT: 72 IN | DIASTOLIC BLOOD PRESSURE: 89 MMHG | HEART RATE: 59 BPM | BODY MASS INDEX: 38.06 KG/M2 | WEIGHT: 281 LBS | OXYGEN SATURATION: 98 % | SYSTOLIC BLOOD PRESSURE: 135 MMHG

## 2017-10-04 DIAGNOSIS — R19.7 DIARRHEA, UNSPECIFIED TYPE: ICD-10-CM

## 2017-10-04 DIAGNOSIS — K76.0 FATTY LIVER: ICD-10-CM

## 2017-10-04 DIAGNOSIS — R10.13 EPIGASTRIC PAIN: Primary | ICD-10-CM

## 2017-10-04 PROCEDURE — 99214 OFFICE O/P EST MOD 30 MIN: CPT | Performed by: PHYSICIAN ASSISTANT

## 2017-10-04 NOTE — PROGRESS NOTES
Chief Complaint   Patient presents with   • Abdominal Pain   • Diarrhea       Amos Griffin is a 32 y.o. male who presents to the office today for follow up appointment for Abdominal Pain and Diarrhea  .    HPI    The patient reports abdominal pain in the epigastric region.  He had an EGD last week which revealed a normal EGD and small internal hemorrhoids.  Pathology report revealed mild gastritis and inflammation in the esophagus.  Patient takes Protonix 40 daily.  He reports that rectal bleeding has resolved.  Patient reports that often he has fecal urgency immediately after meals.  His stools continue to be loose as well.  Occasionally he will have constipation.  He characterizes them as being types 5 and 6 on the [place] Stool Form Scale.  US of his liver revealed a fatty liver.        Review of Systems   Constitutional: Negative.    HENT: Negative.    Eyes: Negative.    Respiratory: Positive for chest tightness.    Cardiovascular: Positive for chest pain.   Gastrointestinal: Negative for abdominal distention, abdominal pain, anal bleeding, blood in stool, constipation, diarrhea, nausea, rectal pain and vomiting.   Endocrine: Negative.    Genitourinary: Negative.    Musculoskeletal: Negative.    Skin: Negative.    Allergic/Immunologic: Negative.    Neurological: Negative.    Hematological: Negative.    Psychiatric/Behavioral: Negative.        ACTIVE PROBLEMS:   Specialty Problems     None          PAST MEDICAL HISTORY:  No past medical history on file.    SURGICAL HISTORY:  Past Surgical History:   Procedure Laterality Date   • APPENDECTOMY     • CHOLECYSTECTOMY     • COLONOSCOPY N/A 9/26/2017    Procedure: COLONOSCOPY CPT CODE: 60285;  Surgeon: Clinton Bernal III, MD;  Location: [facility];  Service:    • ENDOSCOPY N/A 9/26/2017    Procedure: ESOPHAGOGASTRODUODENOSCOPY WITH DILATATION CPT CODE: 37799;  Surgeon: Clinton Bernal III, MD;  Location: [facility];  Service:    • KNEE SURGERY    "      FAMILY HISTORY:  Family History   Problem Relation Age of Onset   • No Known Problems Mother    • No Known Problems Father    • No Known Problems Sister    • No Known Problems Brother    • No Known Problems Son    • No Known Problems Daughter    • No Known Problems Maternal Grandmother    • No Known Problems Maternal Grandfather    • No Known Problems Paternal Grandmother    • No Known Problems Paternal Grandfather    • No Known Problems Cousin    • Rheum arthritis Neg Hx    • Osteoarthritis Neg Hx    • Asthma Neg Hx    • Diabetes Neg Hx    • Heart failure Neg Hx    • Hyperlipidemia Neg Hx    • Hypertension Neg Hx    • Migraines Neg Hx    • Rashes / Skin problems Neg Hx    • Seizures Neg Hx    • Stroke Neg Hx    • Thyroid disease Neg Hx        SOCIAL HISTORY:  Social History   Substance Use Topics   • Smoking status: Former Smoker     Quit date: 04/2017   • Smokeless tobacco: Never Used   • Alcohol use No       CURRENT MEDICATION:    Current Outpatient Prescriptions:   •  pantoprazole (PROTONIX) 40 MG EC tablet, Take 1 tablet by mouth Daily., Disp: 30 tablet, Rfl: 3    ALLERGIES:  Penicillins; Phenergan [promethazine hcl]; and Vistaril [hydroxyzine hcl]    VISIT VITALS:  /89  Pulse 59  Ht 72\" (182.9 cm)  Wt 281 lb (127 kg)  SpO2 98%  BMI 38.11 kg/m2    Physical Exam   Constitutional: He is oriented to person, place, and time. He appears well-developed and well-nourished. No distress.   HENT:   Head: Normocephalic and atraumatic.   Right Ear: External ear normal.   Left Ear: External ear normal.   Nose: Nose normal.   Mouth/Throat: Oropharynx is clear and moist. No oropharyngeal exudate.   Eyes: Conjunctivae and EOM are normal. Pupils are equal, round, and reactive to light. Right eye exhibits no discharge. Left eye exhibits no discharge. No scleral icterus.   Neck: Normal range of motion. Neck supple. No JVD present. No tracheal deviation present. No thyromegaly present.   Cardiovascular: Normal " rate, regular rhythm, normal heart sounds and intact distal pulses.  Exam reveals no gallop and no friction rub.    No murmur heard.  Pulmonary/Chest: Effort normal and breath sounds normal. No stridor. No respiratory distress. He has no wheezes. He has no rales. He exhibits no tenderness.   Abdominal: Soft. He exhibits no distension and no mass. There is no tenderness. There is no rebound and no guarding. No hernia.   Decreased bowel sounds   Genitourinary: Rectal exam shows guaiac negative stool.   Lymphadenopathy:     He has no cervical adenopathy.   Neurological: He is alert and oriented to person, place, and time. He has normal reflexes. He displays normal reflexes. No cranial nerve deficit. He exhibits normal muscle tone. Coordination normal.   Skin: Skin is warm and dry. No rash noted. He is not diaphoretic. No erythema. No pallor.   Psychiatric: He has a normal mood and affect. His behavior is normal. Judgment and thought content normal.       Assessment/Plan      Diagnosis Plan   1. Epigastric pain  XR Abdomen KUB    Comprehensive Metabolic Panel    TSH    T4, free    Lipid Panel    Hemoglobin A1c   2. Diarrhea, unspecified type  XR Abdomen KUB    CBC & Differential    Comprehensive Metabolic Panel   3. Fatty liver  Comprehensive Metabolic Panel     The patient will have an abdominal xray to rule out overflow diarrhea.  He will also have a CBC, CMP, hemoglobin A1C, lipid panel, TSH and free T4 to rule out other causes of GI symptoms.  Patient voiced understanding and agreement of recommendations.   Return for Recheck.         KELLY Puri

## 2017-10-05 ENCOUNTER — LAB (OUTPATIENT)
Dept: LAB | Facility: HOSPITAL | Age: 32
End: 2017-10-05

## 2017-10-05 ENCOUNTER — HOSPITAL ENCOUNTER (OUTPATIENT)
Dept: GENERAL RADIOLOGY | Facility: HOSPITAL | Age: 32
Discharge: HOME | End: 2017-10-05
Admitting: PHYSICIAN ASSISTANT

## 2017-10-05 DIAGNOSIS — R10.13 EPIGASTRIC PAIN: ICD-10-CM

## 2017-10-05 DIAGNOSIS — R19.7 DIARRHEA, UNSPECIFIED TYPE: ICD-10-CM

## 2017-10-05 DIAGNOSIS — K76.0 FATTY LIVER: ICD-10-CM

## 2017-10-05 LAB
ALBUMIN SERPL-MCNC: 4.5 G/DL (ref 3.5–5)
ALBUMIN/GLOB SERPL: 1.3 G/DL (ref 1.5–2.5)
ALP SERPL-CCNC: 82 U/L (ref 40–129)
ALT SERPL W P-5'-P-CCNC: 51 U/L (ref 10–44)
ANION GAP SERPL CALCULATED.3IONS-SCNC: 4.2 MMOL/L (ref 3.6–11.2)
AST SERPL-CCNC: 34 U/L (ref 10–34)
BASOPHILS # BLD AUTO: 0.05 10*3/MM3 (ref 0–0.3)
BASOPHILS NFR BLD AUTO: 0.6 % (ref 0–2)
BILIRUB SERPL-MCNC: 1 MG/DL (ref 0.2–1.8)
BUN BLD-MCNC: 10 MG/DL (ref 7–21)
BUN/CREAT SERPL: 10.5 (ref 7–25)
CALCIUM SPEC-SCNC: 9.6 MG/DL (ref 7.7–10)
CHLORIDE SERPL-SCNC: 107 MMOL/L (ref 99–112)
CHOLEST SERPL-MCNC: 165 MG/DL (ref 0–200)
CO2 SERPL-SCNC: 31.8 MMOL/L (ref 24.3–31.9)
CREAT BLD-MCNC: 0.95 MG/DL (ref 0.43–1.29)
DEPRECATED RDW RBC AUTO: 38.8 FL (ref 37–54)
EOSINOPHIL # BLD AUTO: 0.16 10*3/MM3 (ref 0–0.7)
EOSINOPHIL NFR BLD AUTO: 1.9 % (ref 0–5)
ERYTHROCYTE [DISTWIDTH] IN BLOOD BY AUTOMATED COUNT: 12.5 % (ref 11.5–14.5)
GFR SERPL CREATININE-BSD FRML MDRD: 92 ML/MIN/1.73
GLOBULIN UR ELPH-MCNC: 3.4 GM/DL
GLUCOSE BLD-MCNC: 103 MG/DL (ref 70–110)
HBA1C MFR BLD: 5 % (ref 4.5–5.7)
HCT VFR BLD AUTO: 43.8 % (ref 42–52)
HDLC SERPL-MCNC: 39 MG/DL (ref 60–100)
HGB BLD-MCNC: 15.1 G/DL (ref 14–18)
IMM GRANULOCYTES # BLD: 0.02 10*3/MM3 (ref 0–0.03)
IMM GRANULOCYTES NFR BLD: 0.2 % (ref 0–0.5)
LDLC SERPL CALC-MCNC: 101 MG/DL (ref 0–100)
LDLC/HDLC SERPL: 2.58 {RATIO}
LYMPHOCYTES # BLD AUTO: 2.61 10*3/MM3 (ref 1–3)
LYMPHOCYTES NFR BLD AUTO: 31.8 % (ref 21–51)
MCH RBC QN AUTO: 30.1 PG (ref 27–33)
MCHC RBC AUTO-ENTMCNC: 34.5 G/DL (ref 33–37)
MCV RBC AUTO: 87.3 FL (ref 80–94)
MONOCYTES # BLD AUTO: 0.85 10*3/MM3 (ref 0.1–0.9)
MONOCYTES NFR BLD AUTO: 10.4 % (ref 0–10)
NEUTROPHILS # BLD AUTO: 4.52 10*3/MM3 (ref 1.4–6.5)
NEUTROPHILS NFR BLD AUTO: 55.1 % (ref 30–70)
OSMOLALITY SERPL CALC.SUM OF ELEC: 284.3 MOSM/KG (ref 273–305)
PLATELET # BLD AUTO: 230 10*3/MM3 (ref 130–400)
PMV BLD AUTO: 11.1 FL (ref 6–10)
POTASSIUM BLD-SCNC: 4.1 MMOL/L (ref 3.5–5.3)
PROT SERPL-MCNC: 7.9 G/DL (ref 6–8)
RBC # BLD AUTO: 5.02 10*6/MM3 (ref 4.7–6.1)
SODIUM BLD-SCNC: 143 MMOL/L (ref 135–153)
T4 FREE SERPL-MCNC: 1.37 NG/DL (ref 0.89–1.76)
TRIGL SERPL-MCNC: 127 MG/DL (ref 0–150)
TSH SERPL DL<=0.05 MIU/L-ACNC: 0.7 MIU/ML (ref 0.55–4.78)
VLDLC SERPL-MCNC: 25.4 MG/DL
WBC NRBC COR # BLD: 8.21 10*3/MM3 (ref 4.5–12.5)

## 2017-10-05 PROCEDURE — 84443 ASSAY THYROID STIM HORMONE: CPT | Performed by: PHYSICIAN ASSISTANT

## 2017-10-05 PROCEDURE — 80061 LIPID PANEL: CPT | Performed by: PHYSICIAN ASSISTANT

## 2017-10-05 PROCEDURE — 85025 COMPLETE CBC W/AUTO DIFF WBC: CPT | Performed by: PHYSICIAN ASSISTANT

## 2017-10-05 PROCEDURE — 80053 COMPREHEN METABOLIC PANEL: CPT | Performed by: PHYSICIAN ASSISTANT

## 2017-10-05 PROCEDURE — 83036 HEMOGLOBIN GLYCOSYLATED A1C: CPT | Performed by: PHYSICIAN ASSISTANT

## 2017-10-05 PROCEDURE — 74000 HC ABDOMEN KUB: CPT

## 2017-10-05 PROCEDURE — 36415 COLL VENOUS BLD VENIPUNCTURE: CPT

## 2017-10-05 PROCEDURE — 84439 ASSAY OF FREE THYROXINE: CPT | Performed by: PHYSICIAN ASSISTANT

## 2017-10-05 PROCEDURE — 74000 XR ABDOMEN KUB: CPT | Performed by: RADIOLOGY

## 2017-12-05 ENCOUNTER — OFFICE VISIT (OUTPATIENT)
Dept: GASTROENTEROLOGY | Facility: CLINIC | Age: 32
End: 2017-12-05

## 2017-12-05 VITALS
SYSTOLIC BLOOD PRESSURE: 127 MMHG | OXYGEN SATURATION: 98 % | BODY MASS INDEX: 38.74 KG/M2 | HEART RATE: 74 BPM | DIASTOLIC BLOOD PRESSURE: 82 MMHG | WEIGHT: 286 LBS | HEIGHT: 72 IN

## 2017-12-05 DIAGNOSIS — K21.9 GASTROESOPHAGEAL REFLUX DISEASE, ESOPHAGITIS PRESENCE NOT SPECIFIED: ICD-10-CM

## 2017-12-05 DIAGNOSIS — R07.9 CHEST PAIN, UNSPECIFIED TYPE: Primary | ICD-10-CM

## 2017-12-05 PROCEDURE — 99214 OFFICE O/P EST MOD 30 MIN: CPT | Performed by: PHYSICIAN ASSISTANT

## 2017-12-05 RX ORDER — OMEPRAZOLE 40 MG/1
40 CAPSULE, DELAYED RELEASE ORAL
Qty: 60 CAPSULE | Refills: 5 | Status: SHIPPED | OUTPATIENT
Start: 2017-12-05 | End: 2018-01-16

## 2017-12-05 NOTE — PROGRESS NOTES
"Chief Complaint   Patient presents with   • Nausea   • Abdominal Pain     epigastric       Amos Griffin is a 32 y.o. male who presents to the office today for follow up appointment regarding chest pain and epigastric abdominal pain.    HPI  Today, patient denies any change in intermittent chest pain and epigastric pain. This discomfort is accompanied with SOA and moves to the left side of his chest. This has been present for months and is present currently. Cardiac workup has been normal in the ED other than elevated CKMB which normalized after IV hydration. No formal cardiology consultation yet. Discomfort is intermitent but occurs daily. He has trouble sleeping due to the discomfort but it does not help to get up out of bed.  The discomfort is described as feeling as if \"something dropped\" in his chest. His BMs are still occurring 4 times per day and loose, this has been present for years. Sometimes they are normal at #4. No rectal bleeding. Worse diarrhea on some days. Never skip days between bowel movements.     Dysphagia has improved. He is taking Protonix 40 mg once daily and still states that heartburn is present \"sometimes\" with it.     EGD with esophageal dilatation and colonoscopy was performed on 9/26/2017 by Dr. Bernal which revealed:  Normal EGD  Small internal hemorrhoids, otherwise negative colonoscopy      Pathology:       Abdominal film 10/5/17:  Impression         Negative supine view of the abdomen.     Labs 10/5/2017:  Glucose 70 - 110 mg/dL 103   BUN 7 - 21 mg/dL 10   Creatinine 0.43 - 1.29 mg/dL 0.95   Sodium 135 - 153 mmol/L 143   Potassium 3.5 - 5.3 mmol/L 4.1   Chloride 99 - 112 mmol/L 107   CO2 24.3 - 31.9 mmol/L 31.8   Calcium 7.7 - 10.0 mg/dL 9.6   Total Protein 6.0 - 8.0 g/dL 7.9   Albumin 3.50 - 5.00 g/dL 4.50   ALT (SGPT) 10 - 44 U/L 51 (H)   AST (SGOT) 10 - 34 U/L 34   Alkaline Phosphatase 40 - 129 U/L 82   Comments: Note New Reference Ranges   Total Bilirubin 0.2 - 1.8 mg/dL 1.0 "   eGFR Non African Amer >60 mL/min/1.73 92   Globulin gm/dL 3.4   A/G Ratio 1.5 - 2.5 g/dL 1.3 (L)   BUN/Creatinine Ratio 7.0 - 25.0 10.5   Anion Gap 3.6 - 11.2 mmol/L 4.2     WBC 4.50 - 12.50 10*3/mm3 8.21   RBC 4.70 - 6.10 10*6/mm3 5.02   Hemoglobin 14.0 - 18.0 g/dL 15.1   Hematocrit 42.0 - 52.0 % 43.8   MCV 80.0 - 94.0 fL 87.3   MCH 27.0 - 33.0 pg 30.1   MCHC 33.0 - 37.0 g/dL 34.5   RDW 11.5 - 14.5 % 12.5   RDW-SD 37.0 - 54.0 fl 38.8   MPV 6.0 - 10.0 fL 11.1 (H)   Platelets 130 - 400 10*3/mm3 230   Neutrophil % 30.0 - 70.0 % 55.1   Lymphocyte % 21.0 - 51.0 % 31.8   Monocyte % 0.0 - 10.0 % 10.4 (H)   Eosinophil % 0.0 - 5.0 % 1.9   Basophil % 0.0 - 2.0 % 0.6   Immature Grans % 0.0 - 0.5 % 0.2   Neutrophils, Absolute 1.40 - 6.50 10*3/mm3 4.52   Lymphocytes, Absolute 1.00 - 3.00 10*3/mm3 2.61   Monocytes, Absolute 0.10 - 0.90 10*3/mm3 0.85   Eosinophils, Absolute 0.00 - 0.70 10*3/mm3 0.16   Basophils, Absolute 0.00 - 0.30 10*3/mm3 0.05   Immature Grans, Absolute 0.00 - 0.03 10*3/mm3 0.02     TSH normal  T4 normal  HgbA1C normal    Total Cholesterol 0 - 200 mg/dL 165   Triglycerides 0 - 150 mg/dL 127   HDL Cholesterol 60 - 100 mg/dL 39 (L)   LDL Cholesterol  0 - 100 mg/dL 101 (H)   VLDL Cholesterol mg/dL 25.4   LDL/HDL Ratio  2.58     US liver 7/27/2017:  FINDINGS: Sonographic imaging of the liver shows a homogeneous  echotexture of the liver. There is no intrahepatic ductal dilatation or  focal hepatic mass.  The liver is slightly hyperechoic possibly representing mild fatty  infiltration of the liver.  Hepatic flow is hepatopetal.  IMPRESSION:  Mild fatty infiltration of the liver.     Complete serological liver evaluation 7/27/2017:  Ferritin 21.90 - 321.70 ng/mL 365.00 (H)     Iron panel normal    Hepatitis B Surface Ag Non-Reactive Non-Reactive     Hep B S Ab Non-Reactive Non-Reactive     He had negative results for the following alpha-1 antitrypsin deficiency, autoimmune hepatitis, primary biliary cirrhosis,  Torres's diease, hereditary hemochromatosis, celiac diease, hepatitis B active infection and hepatitis C.      Review of Systems   Constitutional: Negative.    HENT: Negative.    Eyes: Negative.    Respiratory: Positive for chest tightness and shortness of breath.    Cardiovascular: Positive for chest pain.   Gastrointestinal: Positive for abdominal pain and nausea. Negative for abdominal distention, anal bleeding, blood in stool, constipation, diarrhea, rectal pain and vomiting.   Endocrine: Negative.    Genitourinary: Negative.    Musculoskeletal: Negative.    Skin: Negative.    Allergic/Immunologic: Negative.    Neurological: Negative.    Hematological: Negative.    Psychiatric/Behavioral: Negative.        ACTIVE PROBLEMS:   Specialty Problems        Gastroenterology Problems    Fatty liver              PAST MEDICAL HISTORY:  No past medical history on file.    Past Surgical History:   Procedure Laterality Date   • APPENDECTOMY     • CHOLECYSTECTOMY     • COLONOSCOPY N/A 9/26/2017    Procedure: COLONOSCOPY CPT CODE: 28603;  Surgeon: Clinton Bernal III, MD;  Location: [facility];  Service:    • ENDOSCOPY N/A 9/26/2017    Procedure: ESOPHAGOGASTRODUODENOSCOPY WITH DILATATION CPT CODE: 87121;  Surgeon: Clinton Bernal III, MD;  Location: [facility];  Service:    • KNEE SURGERY         Family History   Problem Relation Age of Onset   • No Known Problems Mother    • No Known Problems Father    • No Known Problems Sister    • No Known Problems Brother    • No Known Problems Son    • No Known Problems Daughter    • No Known Problems Maternal Grandmother    • No Known Problems Maternal Grandfather    • No Known Problems Paternal Grandmother    • No Known Problems Paternal Grandfather    • No Known Problems Cousin    • Rheum arthritis Neg Hx    • Osteoarthritis Neg Hx    • Asthma Neg Hx    • Diabetes Neg Hx    • Heart failure Neg Hx    • Hyperlipidemia Neg Hx    • Hypertension Neg Hx    • Migraines Neg Hx    •  "Rashes / Skin problems Neg Hx    • Seizures Neg Hx    • Stroke Neg Hx    • Thyroid disease Neg Hx        Social History   Substance Use Topics   • Smoking status: Former Smoker     Quit date: 04/2017   • Smokeless tobacco: Never Used   • Alcohol use No       CURRENT MEDICATION:  •  pantoprazole (PROTONIX) 40 MG EC tablet, Take 1 tablet by mouth Daily., Disp: 30 tablet, Rfl: 3    ALLERGIES:  Penicillins; Phenergan [promethazine hcl]; and Vistaril [hydroxyzine hcl]    VISIT VITALS:  /82  Pulse 74  Ht 182.9 cm (72\")  Wt 130 kg (286 lb)  SpO2 98%  BMI 38.79 kg/m2    Physical Exam   Constitutional: He is oriented to person, place, and time. He appears well-developed and well-nourished. No distress.   HENT:   Head: Normocephalic and atraumatic.   Right Ear: External ear normal.   Left Ear: External ear normal.   Nose: Nose normal.   Mouth/Throat: Oropharynx is clear and moist.   Eyes: Conjunctivae and EOM are normal. Right eye exhibits no discharge. Left eye exhibits no discharge. No scleral icterus.   Neck: Normal range of motion. Neck supple.   Cardiovascular: Normal rate, regular rhythm and normal heart sounds.  Exam reveals no gallop and no friction rub.    No murmur heard.  Pulmonary/Chest: Effort normal and breath sounds normal. No respiratory distress. He has no wheezes. He has no rales. He exhibits no tenderness.   Abdominal: Soft. Normal appearance and bowel sounds are normal. He exhibits no distension, no ascites and no mass. There is no tenderness. There is no rigidity and no guarding. No hernia.   Musculoskeletal: Normal range of motion. He exhibits no edema or deformity.   Neurological: He is alert and oriented to person, place, and time. He exhibits normal muscle tone. Coordination normal.   Skin: Skin is warm and dry. No rash noted. No erythema. No pallor.   Psychiatric: He has a normal mood and affect. His behavior is normal. Judgment and thought content normal.   Nursing note and vitals " reviewed.      Assessment/Plan     1. Chest pain, unspecified type    2. Gastroesophageal reflux disease, esophagitis presence not specified      Although EGD did not explain his significant epigastric abdominal pain or chest pain, I will change PPI from Protonix to Prilosec 40 mg BID 30 minutes before meals as an effort to decrease symptoms. Sometimes hiatal hernias are not noted on EGD, I have ordered FL esophagram checking for hiatal hernia or esophageal dysmotility. He will take GI cocktail as needed for relief of chest pain. I spoke with his pharmacist who reported that they do not have donnatol for GI cocktail, used Benadryl instead which is classic GI cocktail filled at his pharmacy. Also, gave Simethicone as needed for relief of gas pains if contributing.     Ambulatory referral to cardiology as soon as possible.         Follow up as needed in interval. Call with concerns.       Electronically signed 12/6/2017 11:12 AM  Rita Riojas PA-C, [facility]

## 2017-12-07 ENCOUNTER — HOSPITAL ENCOUNTER (EMERGENCY)
Facility: HOSPITAL | Age: 32
Discharge: HOME | End: 2017-12-07
Attending: EMERGENCY MEDICINE | Admitting: EMERGENCY MEDICINE

## 2017-12-07 ENCOUNTER — APPOINTMENT (OUTPATIENT)
Dept: GENERAL RADIOLOGY | Facility: HOSPITAL | Age: 32
End: 2017-12-07

## 2017-12-07 VITALS
WEIGHT: 282 LBS | RESPIRATION RATE: 18 BRPM | TEMPERATURE: 98.2 F | BODY MASS INDEX: 38.19 KG/M2 | HEIGHT: 72 IN | SYSTOLIC BLOOD PRESSURE: 122 MMHG | OXYGEN SATURATION: 98 % | HEART RATE: 84 BPM | DIASTOLIC BLOOD PRESSURE: 69 MMHG

## 2017-12-07 DIAGNOSIS — R07.9 CHEST PAIN, UNSPECIFIED TYPE: Primary | ICD-10-CM

## 2017-12-07 LAB
ALBUMIN SERPL-MCNC: 4.7 G/DL (ref 3.5–5)
ALBUMIN/GLOB SERPL: 1.3 G/DL (ref 1.5–2.5)
ALP SERPL-CCNC: 70 U/L (ref 40–129)
ALT SERPL W P-5'-P-CCNC: 82 U/L (ref 10–44)
ANION GAP SERPL CALCULATED.3IONS-SCNC: 6.8 MMOL/L (ref 3.6–11.2)
AST SERPL-CCNC: 42 U/L (ref 10–34)
BASOPHILS # BLD AUTO: 0.04 10*3/MM3 (ref 0–0.3)
BASOPHILS NFR BLD AUTO: 0.4 % (ref 0–2)
BILIRUB SERPL-MCNC: 1.8 MG/DL (ref 0.2–1.8)
BILIRUB UR QL STRIP: NEGATIVE
BUN BLD-MCNC: 13 MG/DL (ref 7–21)
BUN/CREAT SERPL: 12.6 (ref 7–25)
CALCIUM SPEC-SCNC: 10 MG/DL (ref 7.7–10)
CHLORIDE SERPL-SCNC: 105 MMOL/L (ref 99–112)
CLARITY UR: CLEAR
CO2 SERPL-SCNC: 29.2 MMOL/L (ref 24.3–31.9)
COLOR UR: YELLOW
CREAT BLD-MCNC: 1.03 MG/DL (ref 0.43–1.29)
DEPRECATED RDW RBC AUTO: 37.7 FL (ref 37–54)
EOSINOPHIL # BLD AUTO: 0.1 10*3/MM3 (ref 0–0.7)
EOSINOPHIL NFR BLD AUTO: 0.9 % (ref 0–5)
ERYTHROCYTE [DISTWIDTH] IN BLOOD BY AUTOMATED COUNT: 12 % (ref 11.5–14.5)
GFR SERPL CREATININE-BSD FRML MDRD: 84 ML/MIN/1.73
GLOBULIN UR ELPH-MCNC: 3.6 GM/DL
GLUCOSE BLD-MCNC: 95 MG/DL (ref 70–110)
GLUCOSE UR STRIP-MCNC: NEGATIVE MG/DL
HCT VFR BLD AUTO: 46 % (ref 42–52)
HGB BLD-MCNC: 16.4 G/DL (ref 14–18)
HGB UR QL STRIP.AUTO: NEGATIVE
IMM GRANULOCYTES # BLD: 0.03 10*3/MM3 (ref 0–0.03)
IMM GRANULOCYTES NFR BLD: 0.3 % (ref 0–0.5)
KETONES UR QL STRIP: NEGATIVE
LEUKOCYTE ESTERASE UR QL STRIP.AUTO: NEGATIVE
LIPASE SERPL-CCNC: 39 U/L (ref 13–60)
LYMPHOCYTES # BLD AUTO: 1.92 10*3/MM3 (ref 1–3)
LYMPHOCYTES NFR BLD AUTO: 17.6 % (ref 21–51)
MCH RBC QN AUTO: 30.9 PG (ref 27–33)
MCHC RBC AUTO-ENTMCNC: 35.7 G/DL (ref 33–37)
MCV RBC AUTO: 86.8 FL (ref 80–94)
MONOCYTES # BLD AUTO: 1.04 10*3/MM3 (ref 0.1–0.9)
MONOCYTES NFR BLD AUTO: 9.5 % (ref 0–10)
NEUTROPHILS # BLD AUTO: 7.79 10*3/MM3 (ref 1.4–6.5)
NEUTROPHILS NFR BLD AUTO: 71.3 % (ref 30–70)
NITRITE UR QL STRIP: NEGATIVE
OSMOLALITY SERPL CALC.SUM OF ELEC: 281.2 MOSM/KG (ref 273–305)
PH UR STRIP.AUTO: 6 [PH] (ref 5–8)
PLATELET # BLD AUTO: 242 10*3/MM3 (ref 130–400)
PMV BLD AUTO: 10.7 FL (ref 6–10)
POTASSIUM BLD-SCNC: 4.1 MMOL/L (ref 3.5–5.3)
PROT SERPL-MCNC: 8.3 G/DL (ref 6–8)
PROT UR QL STRIP: NEGATIVE
RBC # BLD AUTO: 5.3 10*6/MM3 (ref 4.7–6.1)
SODIUM BLD-SCNC: 141 MMOL/L (ref 135–153)
SP GR UR STRIP: 1.02 (ref 1–1.03)
TROPONIN I SERPL-MCNC: 0.01 NG/ML
TROPONIN I SERPL-MCNC: <0.006 NG/ML
UROBILINOGEN UR QL STRIP: NORMAL
WBC NRBC COR # BLD: 10.92 10*3/MM3 (ref 4.5–12.5)

## 2017-12-07 PROCEDURE — 83690 ASSAY OF LIPASE: CPT | Performed by: PHYSICIAN ASSISTANT

## 2017-12-07 PROCEDURE — 71020 XR CHEST 2 VW: CPT | Performed by: RADIOLOGY

## 2017-12-07 PROCEDURE — 99284 EMERGENCY DEPT VISIT MOD MDM: CPT

## 2017-12-07 PROCEDURE — 71020 HC CHEST PA AND LATERAL: CPT

## 2017-12-07 PROCEDURE — 93005 ELECTROCARDIOGRAM TRACING: CPT | Performed by: PHYSICIAN ASSISTANT

## 2017-12-07 PROCEDURE — 36415 COLL VENOUS BLD VENIPUNCTURE: CPT

## 2017-12-07 PROCEDURE — 93010 ELECTROCARDIOGRAM REPORT: CPT | Performed by: INTERNAL MEDICINE

## 2017-12-07 PROCEDURE — 85025 COMPLETE CBC W/AUTO DIFF WBC: CPT | Performed by: PHYSICIAN ASSISTANT

## 2017-12-07 PROCEDURE — 84484 ASSAY OF TROPONIN QUANT: CPT | Performed by: PHYSICIAN ASSISTANT

## 2017-12-07 PROCEDURE — 81003 URINALYSIS AUTO W/O SCOPE: CPT | Performed by: PHYSICIAN ASSISTANT

## 2017-12-07 PROCEDURE — 80053 COMPREHEN METABOLIC PANEL: CPT | Performed by: PHYSICIAN ASSISTANT

## 2017-12-07 RX ORDER — PANTOPRAZOLE SODIUM 40 MG/1
40 TABLET, DELAYED RELEASE ORAL DAILY
COMMUNITY
End: 2018-01-16 | Stop reason: SDUPTHER

## 2017-12-07 RX ORDER — SODIUM CHLORIDE 0.9 % (FLUSH) 0.9 %
10 SYRINGE (ML) INJECTION AS NEEDED
Status: DISCONTINUED | OUTPATIENT
Start: 2017-12-07 | End: 2017-12-07

## 2017-12-07 NOTE — ED NOTES
DURING STAY PT CARDIAC MONITOR SHOWING SINUS IN 60'S AND 70'S..  NO DISTRESS VOICED.  DOES LEAVE WITH PAIN SCALE OF 1.     Catherine Muñoz RN  12/07/17 2610

## 2017-12-07 NOTE — ED PROVIDER NOTES
Subjective   HPI Comments: Pt states he has had intermittent epigastric pain that radiates up into chest on and off for the past 2 years. States this particular episode began 4 days ago and has been accompanied by SOB at times. He has had extensive GI workup, and most recently saw them x2 days ago, at which time they told patient they did not feel that it was a GI issue and were going to refer him on to cardiologist for further evaluation; pt has an appt with Dr. Koo in 4 days. States that his insurance runs out at the end of this month and was hoping to possibly be admitted for chest pain workup before insurance runs out. He is currently chest pain free, but states that it will come and go, and describes it as a pressure.He does state he was recently switched from protonix to prilosec.    Patient is a 32 y.o. male presenting with chest pain.   History provided by:  Patient   used: No    Chest Pain   Pain location:  Epigastric  Pain quality: pressure    Pain radiates to:  Precordial region  Pain severity:  Moderate  Onset quality:  Sudden  Duration:  4 days (has been having intermittent episodes over the past 2 years.)  Progression:  Waxing and waning  Chronicity:  Recurrent  Context: at rest    Relieved by:  Rest  Worsened by:  Nothing  Ineffective treatments:  None tried  Associated symptoms: abdominal pain, nausea, shortness of breath and vomiting    Associated symptoms: no cough, no dizziness, no fever, no headache, no palpitations and no weakness    Risk factors: male sex and obesity    Risk factors: no diabetes mellitus, no high cholesterol, no hypertension and no smoking        Review of Systems   Constitutional: Negative for activity change and fever.   HENT: Negative for congestion, ear pain and sore throat.    Eyes: Negative for pain.   Respiratory: Positive for shortness of breath. Negative for cough and wheezing.    Cardiovascular: Positive for chest pain. Negative for palpitations.    Gastrointestinal: Positive for abdominal pain, nausea and vomiting. Negative for abdominal distention and diarrhea.   Genitourinary: Negative for difficulty urinating and dysuria.   Musculoskeletal: Negative for arthralgias and myalgias.   Skin: Negative for rash and wound.   Neurological: Negative for dizziness, weakness and headaches.   Psychiatric/Behavioral: Negative for agitation.   All other systems reviewed and are negative.      History reviewed. No pertinent past medical history.    Allergies   Allergen Reactions   • Penicillins    • Phenergan [Promethazine Hcl]    • Vistaril [Hydroxyzine Hcl]        Past Surgical History:   Procedure Laterality Date   • APPENDECTOMY     • CHOLECYSTECTOMY     • COLONOSCOPY N/A 9/26/2017    Procedure: COLONOSCOPY CPT CODE: 04073;  Surgeon: Clinton Bernal III, MD;  Location: [facility];  Service:    • ENDOSCOPY N/A 9/26/2017    Procedure: ESOPHAGOGASTRODUODENOSCOPY WITH DILATATION CPT CODE: 98549;  Surgeon: Clinton Bernal III, MD;  Location: [facility];  Service:    • KNEE SURGERY         Family History   Problem Relation Age of Onset   • No Known Problems Mother    • No Known Problems Father    • No Known Problems Sister    • No Known Problems Brother    • No Known Problems Son    • No Known Problems Daughter    • No Known Problems Maternal Grandmother    • No Known Problems Maternal Grandfather    • No Known Problems Paternal Grandmother    • No Known Problems Paternal Grandfather    • No Known Problems Cousin    • Rheum arthritis Neg Hx    • Osteoarthritis Neg Hx    • Asthma Neg Hx    • Diabetes Neg Hx    • Heart failure Neg Hx    • Hyperlipidemia Neg Hx    • Hypertension Neg Hx    • Migraines Neg Hx    • Rashes / Skin problems Neg Hx    • Seizures Neg Hx    • Stroke Neg Hx    • Thyroid disease Neg Hx        Social History     Social History   • Marital status:      Spouse name: N/A   • Number of children: N/A   • Years of education: N/A     Social History  Main Topics   • Smoking status: Former Smoker     Quit date: 04/2017   • Smokeless tobacco: Never Used   • Alcohol use No   • Drug use: No   • Sexual activity: Defer     Other Topics Concern   • None     Social History Narrative           Objective   Physical Exam   Constitutional: He is oriented to person, place, and time. He appears well-developed and well-nourished.   HENT:   Head: Normocephalic and atraumatic.   Eyes: EOM are normal. Pupils are equal, round, and reactive to light.   Neck: Normal range of motion. Neck supple.   Cardiovascular: Normal rate, regular rhythm and normal heart sounds.    Pulmonary/Chest: Effort normal and breath sounds normal. He exhibits no tenderness.   Abdominal: Soft. Bowel sounds are normal.   Musculoskeletal: Normal range of motion.   Neurological: He is alert and oriented to person, place, and time.   Skin: Skin is warm and dry.   Psychiatric: He has a normal mood and affect. His behavior is normal. Judgment and thought content normal.   Nursing note and vitals reviewed.      Procedures         ED Course  ED Course   Comment By Time   NSR, rate 79. No acute ischemic changes noted. -per KELLY Ohara 12/07 0705   Spoke with Dr. León as well as Dr. Schwartz, agreeable to get a 2nd troponin and if trending down, ok to d/c home to f/u with Dr. Koo in the office on Monday. Pt has been asymptomatic the entire time in ED. Heart score of 1. KELLY Lafleur 12/07 4876                HEART Score (for prediction of 6-week risk of major adverse cardiac event) reviewed and/or performed as part of the patient evaluation and treatment planning process.  The result associated with this review/performance is: 1       MDM  Number of Diagnoses or Management Options  Chest pain, unspecified type:      Amount and/or Complexity of Data Reviewed  Clinical lab tests: ordered and reviewed  Tests in the radiology section of CPT®: ordered and reviewed  Tests in the medicine section of  CPT®: ordered and reviewed  Decide to obtain previous medical records or to obtain history from someone other than the patient: yes  Independent visualization of images, tracings, or specimens: yes    Patient Progress  Patient progress: stable      Final diagnoses:   Chest pain, unspecified type            KELLY Lafleur  12/07/17 2014

## 2017-12-11 ENCOUNTER — HOSPITAL ENCOUNTER (OUTPATIENT)
Dept: GENERAL RADIOLOGY | Facility: HOSPITAL | Age: 32
Discharge: HOME | End: 2017-12-11
Admitting: PHYSICIAN ASSISTANT

## 2017-12-11 ENCOUNTER — TELEPHONE (OUTPATIENT)
Dept: GASTROENTEROLOGY | Facility: CLINIC | Age: 32
End: 2017-12-11

## 2017-12-11 DIAGNOSIS — R07.9 CHEST PAIN, UNSPECIFIED TYPE: ICD-10-CM

## 2017-12-11 DIAGNOSIS — K21.9 GASTROESOPHAGEAL REFLUX DISEASE, ESOPHAGITIS PRESENCE NOT SPECIFIED: ICD-10-CM

## 2017-12-11 PROCEDURE — 74220 X-RAY XM ESOPHAGUS 1CNTRST: CPT

## 2017-12-11 PROCEDURE — 74220 X-RAY XM ESOPHAGUS 1CNTRST: CPT | Performed by: RADIOLOGY

## 2017-12-12 ENCOUNTER — OFFICE VISIT (OUTPATIENT)
Dept: CARDIOLOGY | Facility: CLINIC | Age: 32
End: 2017-12-12

## 2017-12-12 VITALS
HEART RATE: 62 BPM | BODY MASS INDEX: 37.65 KG/M2 | HEIGHT: 72 IN | DIASTOLIC BLOOD PRESSURE: 76 MMHG | SYSTOLIC BLOOD PRESSURE: 119 MMHG | RESPIRATION RATE: 16 BRPM | WEIGHT: 278 LBS

## 2017-12-12 DIAGNOSIS — R06.02 SOB (SHORTNESS OF BREATH): ICD-10-CM

## 2017-12-12 DIAGNOSIS — R07.2 PRECORDIAL PAIN: Primary | ICD-10-CM

## 2017-12-12 DIAGNOSIS — R00.2 PALPITATIONS: ICD-10-CM

## 2017-12-12 PROCEDURE — 99204 OFFICE O/P NEW MOD 45 MIN: CPT | Performed by: INTERNAL MEDICINE

## 2017-12-12 RX ORDER — SUCRALFATE ORAL 1 G/10ML
1 SUSPENSION ORAL 2 TIMES DAILY
COMMUNITY
End: 2017-12-18 | Stop reason: SDUPTHER

## 2017-12-12 NOTE — PROGRESS NOTES
Samuel Duane Kreis, MD  Amos Griffin  : 1985  DATE:2017    Patient Active Problem List   Diagnosis   • Diarrhea   • Epigastric pain   • Fatty liver   • Precordial pain   • SOB (shortness of breath)   • Palpitations       Dear Samuel Duane Kreis, MD:    Subjective     Amos Griffin is a 32 y.o. male with the above medical problems who is being seen for consultation today at the request of Samuel Duane Kreis, MD. Into the patient he has to recent ER visits for chest pain.  He describes the pain is midsternal, oppressive, associated with nausea, worsened by nothing and improved with sucralfate.  He states the intensity is 4/10 on the pain scale.  He also complains of intermittent episode of palpitations where his heart races for 10-15 minutes.  He states the episodes occur 2-3 times per week.  He denies any exacerbating or ameliorating factors.  He denies any associated symptoms.  He states the intensity is mild.  He also complains of intermittent episodes of shortness of breath although he does have a previous history of smoking.  Denies any orthopnea, PND, lower extremity edema, syncope or dizziness.      Past Medical History:   Diagnosis Date   • Chest pain        Past Surgical History:   Procedure Laterality Date   • APPENDECTOMY     • CHOLECYSTECTOMY     • COLONOSCOPY N/A 2017    Procedure: COLONOSCOPY CPT CODE: 66771;  Surgeon: Clinton Bernal III, MD;  Location: [facility];  Service:    • ENDOSCOPY N/A 2017    Procedure: ESOPHAGOGASTRODUODENOSCOPY WITH DILATATION CPT CODE: 32186;  Surgeon: Clinton Bernal III, MD;  Location: [facility];  Service:    • KNEE SURGERY         Family History   Problem Relation Age of Onset   • No Known Problems Mother    • No Known Problems Father    • No Known Problems Sister    • No Known Problems Brother    • No Known Problems Son    • No Known Problems Daughter    • Heart disease Maternal Grandmother    • No Known Problems Maternal  Grandfather    • No Known Problems Paternal Grandmother    • No Known Problems Paternal Grandfather    • No Known Problems Cousin    • Rheum arthritis Neg Hx    • Osteoarthritis Neg Hx    • Asthma Neg Hx    • Diabetes Neg Hx    • Heart failure Neg Hx    • Hyperlipidemia Neg Hx    • Hypertension Neg Hx    • Migraines Neg Hx    • Rashes / Skin problems Neg Hx    • Seizures Neg Hx    • Stroke Neg Hx    • Thyroid disease Neg Hx        Social History     Social History   • Marital status:      Spouse name: N/A   • Number of children: N/A   • Years of education: N/A     Occupational History   • Not on file.     Social History Main Topics   • Smoking status: Former Smoker     Packs/day: 1.00     Types: Cigarettes     Quit date: 04/2017   • Smokeless tobacco: Never Used   • Alcohol use No   • Drug use: No   • Sexual activity: Defer     Other Topics Concern   • Not on file     Social History Narrative         Current Outpatient Prescriptions:   •  omeprazole (priLOSEC) 40 MG capsule, Take 1 capsule by mouth 2 (Two) Times a Day Before Meals., Disp: 60 capsule, Rfl: 5  •  sucralfate (CARAFATE) 1 GM/10ML suspension, Take 1 g by mouth 2 (Two) Times a Day., Disp: , Rfl:   •  aluminum-magnesium hydroxide-simethicone-lidocaine viscous-PB-Hyoscy-Atropine-Scopolamine, (Maalox- lidocaine 2% viscous- Benadryl) Take 30 mL as needed for chest pain up to 3 times daily., Disp: 480 mL, Rfl: 0  •  pantoprazole (PROTONIX) 40 MG EC tablet, Take 40 mg by mouth Daily., Disp: , Rfl:   •  Simethicone 250 MG capsule, Take 1 capsule by mouth 3 (Three) Times a Day As Needed (gas/bloating/flatulence)., Disp: 90 capsule, Rfl: 2    The following portions of the patient's history were reviewed and updated as appropriate: allergies, current medications, past family history, past medical history, past social history, past surgical history and problem list.    Review of Systems   Constitution: Negative for diaphoresis, fever, weakness,  "malaise/fatigue, weight gain and weight loss.   HENT: Negative for congestion, ear discharge, ear pain, hearing loss, hoarse voice, nosebleeds, sore throat and tinnitus.    Eyes: Negative for blurred vision, discharge, double vision and pain.   Cardiovascular: Positive for chest pain and palpitations. Negative for dyspnea on exertion, irregular heartbeat, leg swelling, orthopnea, paroxysmal nocturnal dyspnea and syncope.   Respiratory: Positive for shortness of breath. Negative for cough, hemoptysis and wheezing.    Endocrine: Negative for cold intolerance, heat intolerance, polydipsia, polyphagia and polyuria.   Hematologic/Lymphatic: Negative for bleeding problem. Does not bruise/bleed easily.   Skin: Negative for color change, dry skin, flushing, itching and rash.   Musculoskeletal: Negative for arthritis, back pain, joint pain, joint swelling, muscle cramps, muscle weakness, neck pain and stiffness.   Gastrointestinal: Positive for abdominal pain and heartburn. Negative for change in bowel habit, constipation, diarrhea, hematemesis, hematochezia, melena, nausea and vomiting.   Genitourinary: Negative for bladder incontinence, decreased libido, dysuria, frequency and hematuria.   Neurological: Negative for disturbances in coordination, dizziness, focal weakness, headaches, light-headedness, loss of balance, numbness, paresthesias and tremors.   Psychiatric/Behavioral: Negative for altered mental status, depression and memory loss. The patient does not have insomnia.    Allergic/Immunologic: Negative for hives.       Objective   Blood pressure 119/76, pulse 62, resp. rate 16, height 182.9 cm (72\"), weight 126 kg (278 lb).    Physical Exam   Constitutional: He is oriented to person, place, and time. He appears well-developed and well-nourished.   Obese WM Sitting comfortably on chair.   HENT:   Mouth/Throat: Oropharynx is clear and moist.   Eyes: EOM are normal. Pupils are equal, round, and reactive to light. "   Neck: Neck supple. No JVD present. No tracheal deviation present. No thyromegaly present.   Cardiovascular: Normal rate, regular rhythm, S1 normal and S2 normal.  Exam reveals no gallop and no friction rub.    No murmur heard.  Pulmonary/Chest: Effort normal and breath sounds normal. No respiratory distress. He has no wheezes. He has no rales.   Abdominal: Soft. Bowel sounds are normal. He exhibits no mass. There is tenderness.   Musculoskeletal: Normal range of motion. He exhibits no edema.   Lymphadenopathy:     He has no cervical adenopathy.   Neurological: He is alert and oriented to person, place, and time.   Skin: Skin is warm and dry. No rash noted.   Psychiatric: He has a normal mood and affect.       Procedures    Assessment/Plan      1.  Chest pain: Patient with chest pain concerning for coronary artery disease.  At this point we will evaluate further with an echocardiogram and a stress test.  He is unable to complete a treadmill stress test due to poor exercise tolerance we will request nuclear stress test.    2.  Shortness of breath: Patient with shortness breath appears to be related to COPD due to his extensive history of smoking.  Will evaluate further with PFTs.    3.  Palpitations: Patient with palpitations that are concerning for possible arrhythmias.  At this point we will evaluate further with 48 hour Holter and echocardiogram as above.       Diagnosis Plan   1. Precordial pain  Adult Transthoracic Echo Complete W/ Cont if Necessary Per Protocol    Stress Test With Myocardial Perfusion (1 Day)   2. SOB (shortness of breath)  Full Pulmonary Function Test With Bronchodilator   3. Palpitations  Adult Transthoracic Echo Complete W/ Cont if Necessary Per Protocol    Holter Monitor - 48 Hour            Return in about 4 weeks (around 1/9/2018).    I appreciate the opportunity to participate in this patient's cardiovascular care.    Best Regards    Chance Koo

## 2017-12-18 DIAGNOSIS — Z12.11 ENCOUNTER FOR SCREENING FOR MALIGNANT NEOPLASM OF COLON: Primary | ICD-10-CM

## 2017-12-18 RX ORDER — SUCRALFATE ORAL 1 G/10ML
1 SUSPENSION ORAL 2 TIMES DAILY
Qty: 10 ML | Refills: 0 | Status: ON HOLD | OUTPATIENT
Start: 2017-12-18 | End: 2021-05-24

## 2017-12-20 ENCOUNTER — HOSPITAL ENCOUNTER (OUTPATIENT)
Dept: RESPIRATORY THERAPY | Facility: HOSPITAL | Age: 32
Discharge: HOME | End: 2017-12-20
Attending: INTERNAL MEDICINE | Admitting: INTERNAL MEDICINE

## 2017-12-20 DIAGNOSIS — R06.02 SOB (SHORTNESS OF BREATH): ICD-10-CM

## 2017-12-20 PROCEDURE — 94727 GAS DIL/WSHOT DETER LNG VOL: CPT

## 2017-12-20 PROCEDURE — 94729 DIFFUSING CAPACITY: CPT

## 2017-12-20 PROCEDURE — 94727 GAS DIL/WSHOT DETER LNG VOL: CPT | Performed by: INTERNAL MEDICINE

## 2017-12-20 PROCEDURE — 94729 DIFFUSING CAPACITY: CPT | Performed by: INTERNAL MEDICINE

## 2017-12-20 PROCEDURE — 94060 EVALUATION OF WHEEZING: CPT | Performed by: INTERNAL MEDICINE

## 2017-12-20 PROCEDURE — 94060 EVALUATION OF WHEEZING: CPT

## 2017-12-21 ENCOUNTER — HOSPITAL ENCOUNTER (OUTPATIENT)
Dept: CARDIOLOGY | Facility: HOSPITAL | Age: 32
Discharge: HOME | End: 2017-12-21
Attending: INTERNAL MEDICINE | Admitting: INTERNAL MEDICINE

## 2017-12-21 ENCOUNTER — HOSPITAL ENCOUNTER (OUTPATIENT)
Dept: RESPIRATORY THERAPY | Facility: HOSPITAL | Age: 32
Discharge: HOME | End: 2017-12-21
Attending: INTERNAL MEDICINE

## 2017-12-21 DIAGNOSIS — R00.2 PALPITATIONS: ICD-10-CM

## 2017-12-21 DIAGNOSIS — R07.2 PRECORDIAL PAIN: ICD-10-CM

## 2017-12-21 LAB
BH CV ECHO MEAS - % IVS THICK: 27.3 %
BH CV ECHO MEAS - % LVPW THICK: 35.9 %
BH CV ECHO MEAS - ACS: 1.8 CM
BH CV ECHO MEAS - AO MAX PG (FULL): 0.57 MMHG
BH CV ECHO MEAS - AO MAX PG: 9.6 MMHG
BH CV ECHO MEAS - AO MEAN PG (FULL): 0.75 MMHG
BH CV ECHO MEAS - AO MEAN PG: 5.3 MMHG
BH CV ECHO MEAS - AO ROOT AREA (BSA CORRECTED): 1.2
BH CV ECHO MEAS - AO ROOT AREA: 7.4 CM^2
BH CV ECHO MEAS - AO ROOT DIAM: 3.1 CM
BH CV ECHO MEAS - AO V2 MAX: 154.7 CM/SEC
BH CV ECHO MEAS - AO V2 MEAN: 106.2 CM/SEC
BH CV ECHO MEAS - AO V2 VTI: 33.7 CM
BH CV ECHO MEAS - AVA(I,A): 3.1 CM^2
BH CV ECHO MEAS - AVA(I,D): 3.1 CM^2
BH CV ECHO MEAS - AVA(V,A): 3.1 CM^2
BH CV ECHO MEAS - AVA(V,D): 3.1 CM^2
BH CV ECHO MEAS - BSA(HAYCOCK): 2.6 M^2
BH CV ECHO MEAS - BSA: 2.5 M^2
BH CV ECHO MEAS - BZI_BMI: 35.7 KILOGRAMS/M^2
BH CV ECHO MEAS - BZI_METRIC_HEIGHT: 188 CM
BH CV ECHO MEAS - BZI_METRIC_WEIGHT: 126.1 KG
BH CV ECHO MEAS - CONTRAST EF 4CH: 60.3 ML/M^2
BH CV ECHO MEAS - EDV(CUBED): 85.2 ML
BH CV ECHO MEAS - EDV(MOD-SP4): 73 ML
BH CV ECHO MEAS - EDV(TEICH): 87.7 ML
BH CV ECHO MEAS - EF(CUBED): 93.6 %
BH CV ECHO MEAS - EF(MOD-SP4): 60.3 %
BH CV ECHO MEAS - EF(TEICH): 89.5 %
BH CV ECHO MEAS - ESV(CUBED): 5.5 ML
BH CV ECHO MEAS - ESV(MOD-SP4): 29 ML
BH CV ECHO MEAS - ESV(TEICH): 9.2 ML
BH CV ECHO MEAS - FS: 60 %
BH CV ECHO MEAS - IVS/LVPW: 1.1
BH CV ECHO MEAS - IVSD: 1.6 CM
BH CV ECHO MEAS - IVSS: 2.1 CM
BH CV ECHO MEAS - LA DIMENSION: 4.1 CM
BH CV ECHO MEAS - LA/AO: 1.3
BH CV ECHO MEAS - LV DIASTOLIC VOL/BSA (35-75): 29.2 ML/M^2
BH CV ECHO MEAS - LV MASS(C)D: 272.8 GRAMS
BH CV ECHO MEAS - LV MASS(C)DI: 109.1 GRAMS/M^2
BH CV ECHO MEAS - LV MASS(C)S: 154.8 GRAMS
BH CV ECHO MEAS - LV MASS(C)SI: 61.9 GRAMS/M^2
BH CV ECHO MEAS - LV MAX PG: 9 MMHG
BH CV ECHO MEAS - LV MEAN PG: 4.6 MMHG
BH CV ECHO MEAS - LV SYSTOLIC VOL/BSA (12-30): 11.6 ML/M^2
BH CV ECHO MEAS - LV V1 MAX: 150.1 CM/SEC
BH CV ECHO MEAS - LV V1 MEAN: 98.4 CM/SEC
BH CV ECHO MEAS - LV V1 VTI: 32.9 CM
BH CV ECHO MEAS - LVIDD: 4.4 CM
BH CV ECHO MEAS - LVIDS: 1.8 CM
BH CV ECHO MEAS - LVLD AP4: 8.6 CM
BH CV ECHO MEAS - LVLS AP4: 7.8 CM
BH CV ECHO MEAS - LVOT AREA (M): 3.1 CM^2
BH CV ECHO MEAS - LVOT AREA: 3.2 CM^2
BH CV ECHO MEAS - LVOT DIAM: 2 CM
BH CV ECHO MEAS - LVPWD: 1.4 CM
BH CV ECHO MEAS - LVPWS: 1.9 CM
BH CV ECHO MEAS - MV A MAX VEL: 51.3 CM/SEC
BH CV ECHO MEAS - MV DEC SLOPE: 507.3 CM/SEC^2
BH CV ECHO MEAS - MV E MAX VEL: 113.5 CM/SEC
BH CV ECHO MEAS - MV E/A: 2.2
BH CV ECHO MEAS - MV P1/2T MAX VEL: 113.5 CM/SEC
BH CV ECHO MEAS - MV P1/2T: 65.5 MSEC
BH CV ECHO MEAS - MVA P1/2T LCG: 1.9 CM^2
BH CV ECHO MEAS - MVA(P1/2T): 3.4 CM^2
BH CV ECHO MEAS - PA ACC SLOPE: 925.2 CM/SEC^2
BH CV ECHO MEAS - PA ACC TIME: 0.16 SEC
BH CV ECHO MEAS - PA PR(ACCEL): 7.7 MMHG
BH CV ECHO MEAS - RVDD: 3.2 CM
BH CV ECHO MEAS - SI(AO): 100.3 ML/M^2
BH CV ECHO MEAS - SI(CUBED): 31.9 ML/M^2
BH CV ECHO MEAS - SI(LVOT): 42 ML/M^2
BH CV ECHO MEAS - SI(MOD-SP4): 17.6 ML/M^2
BH CV ECHO MEAS - SI(TEICH): 31.4 ML/M^2
BH CV ECHO MEAS - SV(AO): 250.7 ML
BH CV ECHO MEAS - SV(CUBED): 79.7 ML
BH CV ECHO MEAS - SV(LVOT): 105 ML
BH CV ECHO MEAS - SV(MOD-SP4): 44 ML
BH CV ECHO MEAS - SV(TEICH): 78.5 ML
LV EF 2D ECHO EST: 65 %
MAXIMAL PREDICTED HEART RATE: 188 BPM
STRESS TARGET HR: 160 BPM

## 2017-12-21 PROCEDURE — 93306 TTE W/DOPPLER COMPLETE: CPT

## 2017-12-21 PROCEDURE — 93306 TTE W/DOPPLER COMPLETE: CPT | Performed by: INTERNAL MEDICINE

## 2017-12-21 PROCEDURE — 93225 XTRNL ECG REC<48 HRS REC: CPT

## 2017-12-21 PROCEDURE — 93226 XTRNL ECG REC<48 HR SCAN A/R: CPT

## 2017-12-21 PROCEDURE — 93227 XTRNL ECG REC<48 HR R&I: CPT | Performed by: INTERNAL MEDICINE

## 2018-01-04 ENCOUNTER — APPOINTMENT (OUTPATIENT)
Dept: CARDIOLOGY | Facility: HOSPITAL | Age: 33
End: 2018-01-04
Attending: INTERNAL MEDICINE

## 2018-01-04 ENCOUNTER — HOSPITAL ENCOUNTER (OUTPATIENT)
Dept: RESPIRATORY THERAPY | Facility: HOSPITAL | Age: 33
End: 2018-01-04
Attending: INTERNAL MEDICINE

## 2018-01-16 ENCOUNTER — TELEPHONE (OUTPATIENT)
Dept: GASTROENTEROLOGY | Facility: CLINIC | Age: 33
End: 2018-01-16

## 2018-01-16 DIAGNOSIS — K21.9 GASTROESOPHAGEAL REFLUX DISEASE, ESOPHAGITIS PRESENCE NOT SPECIFIED: Primary | ICD-10-CM

## 2018-01-16 RX ORDER — PANTOPRAZOLE SODIUM 40 MG/1
40 TABLET, DELAYED RELEASE ORAL
Qty: 60 TABLET | Refills: 2 | Status: SHIPPED | OUTPATIENT
Start: 2018-01-16 | End: 2018-09-11 | Stop reason: SDUPTHER

## 2018-01-16 NOTE — TELEPHONE ENCOUNTER
Patients wife called and is requesting Protonix be sent to patients pharmacy. Patient has tried the Prilosec and does not think it works as well. Thanks

## 2018-01-17 NOTE — TELEPHONE ENCOUNTER
I spoke with patient and let him know Protonix was sent to his pharmacy. Patient voiced understanding.

## 2018-01-23 ENCOUNTER — OFFICE VISIT (OUTPATIENT)
Dept: CARDIOLOGY | Facility: CLINIC | Age: 33
End: 2018-01-23

## 2018-01-23 VITALS
HEIGHT: 72 IN | BODY MASS INDEX: 37.73 KG/M2 | RESPIRATION RATE: 16 BRPM | WEIGHT: 278.6 LBS | HEART RATE: 75 BPM | SYSTOLIC BLOOD PRESSURE: 117 MMHG | DIASTOLIC BLOOD PRESSURE: 73 MMHG

## 2018-01-23 DIAGNOSIS — R07.2 PRECORDIAL PAIN: Primary | ICD-10-CM

## 2018-01-23 DIAGNOSIS — R00.2 PALPITATIONS: ICD-10-CM

## 2018-01-23 DIAGNOSIS — R06.02 SOB (SHORTNESS OF BREATH): ICD-10-CM

## 2018-01-23 PROCEDURE — 99213 OFFICE O/P EST LOW 20 MIN: CPT | Performed by: INTERNAL MEDICINE

## 2018-01-23 NOTE — PROGRESS NOTES
Samuel Duane Kreis, MD  Amos Griffin  : 1985  DATE:2017    Patient Active Problem List   Diagnosis   • Diarrhea   • Epigastric pain   • Fatty liver   • Precordial pain   • SOB (shortness of breath)   • Palpitations       Dear Samuel Duane Kreis, MD:    Subjective     Amos Griffin is a 32 y.o. male with the above medical problems who is being seen for Follow-up.  According to the patient is not having any further episodes of chest pain.  He states the pain occurs 2-3 times per week.  He denies any further episodes of palpitations.  He did complete a 48-hour Holter which showed no evidence of arrhythmias.  He also completed the echocardiogram showed normal ejection fraction no wall motion abnormalities.  He did not complete the stress test for an unclear reason.        Current Outpatient Prescriptions:   •  pantoprazole (PROTONIX) 40 MG EC tablet, Take 1 tablet by mouth 2 (Two) Times a Day Before Meals., Disp: 60 tablet, Rfl: 2  •  sucralfate (CARAFATE) 1 GM/10ML suspension, Take 10 mL by mouth 2 (Two) Times a Day., Disp: 10 mL, Rfl: 0    The following portions of the patient's history were reviewed and updated as appropriate: allergies, current medications, past family history, past medical history, past social history, past surgical history and problem list.    Review of Systems   Constitution: Negative for chills and fever.   HENT: Negative for congestion, nosebleeds and sore throat.    Eyes: Negative for blurred vision, pain and redness.   Cardiovascular: Negative for chest pain, leg swelling, palpitations and syncope.   Respiratory: Negative for cough, hemoptysis, shortness of breath and wheezing.    Musculoskeletal: Negative for arthritis, joint pain, joint swelling and stiffness.   Gastrointestinal: Negative for abdominal pain, constipation, diarrhea, hematemesis, hematochezia, melena, nausea and vomiting.   Genitourinary: Negative for dysuria and hematuria.   Neurological: Negative for  "dizziness and headaches.   Psychiatric/Behavioral: Negative for depression. The patient is not nervous/anxious.        Objective   Blood pressure 117/73, pulse 75, resp. rate 16, height 182.9 cm (72.01\"), weight 126 kg (278 lb 9.6 oz).    Physical Exam   Constitutional: He is oriented to person, place, and time. He appears well-developed and well-nourished.   Obese WM Sitting comfortably on chair.   HENT:   Mouth/Throat: Oropharynx is clear and moist.   Eyes: EOM are normal. Pupils are equal, round, and reactive to light.   Neck: Neck supple. No JVD present. No tracheal deviation present. No thyromegaly present.   Cardiovascular: Normal rate, regular rhythm, S1 normal and S2 normal.  Exam reveals no gallop and no friction rub.    No murmur heard.  Pulmonary/Chest: Effort normal and breath sounds normal. No respiratory distress. He has no wheezes. He has no rales.   Abdominal: Soft. Bowel sounds are normal. He exhibits no mass. There is tenderness.   Musculoskeletal: Normal range of motion. He exhibits no edema.   Lymphadenopathy:     He has no cervical adenopathy.   Neurological: He is alert and oriented to person, place, and time.   Skin: Skin is warm and dry. No rash noted.   Psychiatric: He has a normal mood and affect.       Procedures    Assessment/Plan      1.  Chest pain: According to the patient he believes his chest discomfort was related to GI problems.  However once again discussed with him the importance of further evaluation with stress test to confirm this is not related to coronary artery disease.  This point the patient is refusing this test.  He states he does not think is related to coronary artery disease and wants to continue to monitor his symptoms.  I advised him of the risks of heart attack with not proceeding with the testing is planned.  He voiced understanding and once again stated he did not want to have the stress test done at this time.  Will abide by the patient's wishes and continue to " monitor.  If he continues to present with chest pain will need to consider stress test at that time.    2.  Shortness of breath: Patient with shortness breath appears to be related to COPD due to his extensive history of smoking.  He underwent PFTs which were noted being conclusive.  This appears to be related to poor patient effort.  I discussed with him the option of repeating the PFTs but he is unwilling to do so at this time.  He states he wants to monitor his shortness of breath.    3.  Palpitations: Patient with a history of palpitations of unclear etiology.  He did undergo a 48-hour Holter which did not show any evidence of arrhythmias.  According to the patient his palpitations and now resolved.  We will therefore continue to monitor for recurrence.       Diagnosis Plan   1. Precordial pain     2. SOB (shortness of breath)     3. Palpitations              Return in about 3 months (around 4/23/2018).    I appreciate the opportunity to participate in this patient's cardiovascular care.    Best Regards    Chance Koo

## 2018-09-11 ENCOUNTER — TELEPHONE (OUTPATIENT)
Dept: GASTROENTEROLOGY | Facility: CLINIC | Age: 33
End: 2018-09-11

## 2018-09-11 DIAGNOSIS — K21.9 GASTROESOPHAGEAL REFLUX DISEASE, ESOPHAGITIS PRESENCE NOT SPECIFIED: ICD-10-CM

## 2018-09-11 RX ORDER — PANTOPRAZOLE SODIUM 40 MG/1
40 TABLET, DELAYED RELEASE ORAL
Qty: 60 TABLET | Refills: 5 | Status: ON HOLD | OUTPATIENT
Start: 2018-09-11 | End: 2021-05-24

## 2019-04-23 ENCOUNTER — HOSPITAL ENCOUNTER (OUTPATIENT)
Dept: PHYSICAL THERAPY | Facility: HOSPITAL | Age: 34
Setting detail: THERAPIES SERIES
Discharge: HOME | End: 2019-04-23

## 2019-04-23 PROCEDURE — 97750 PHYSICAL PERFORMANCE TEST: CPT

## 2020-10-12 ENCOUNTER — LAB (OUTPATIENT)
Dept: LAB | Facility: HOSPITAL | Age: 35
End: 2020-10-12

## 2020-10-12 ENCOUNTER — TRANSCRIBE ORDERS (OUTPATIENT)
Dept: ADMINISTRATIVE | Facility: HOSPITAL | Age: 35
End: 2020-10-12

## 2020-10-12 DIAGNOSIS — Z20.828 EXPOSURE TO SARS-ASSOCIATED CORONAVIRUS: ICD-10-CM

## 2020-10-12 DIAGNOSIS — Z20.828 EXPOSURE TO SARS-ASSOCIATED CORONAVIRUS: Primary | ICD-10-CM

## 2020-10-12 PROCEDURE — U0004 COV-19 TEST NON-CDC HGH THRU: HCPCS

## 2020-10-12 PROCEDURE — C9803 HOPD COVID-19 SPEC COLLECT: HCPCS

## 2020-10-13 LAB — SARS-COV-2 RNA RESP QL NAA+PROBE: NOT DETECTED

## 2021-03-18 ENCOUNTER — BULK ORDERING (OUTPATIENT)
Dept: CASE MANAGEMENT | Facility: OTHER | Age: 36
End: 2021-03-18

## 2021-03-18 DIAGNOSIS — Z23 IMMUNIZATION DUE: ICD-10-CM

## 2021-05-20 ENCOUNTER — APPOINTMENT (OUTPATIENT)
Dept: GENERAL RADIOLOGY | Facility: HOSPITAL | Age: 36
End: 2021-05-20

## 2021-05-20 ENCOUNTER — HOSPITAL ENCOUNTER (EMERGENCY)
Facility: HOSPITAL | Age: 36
Discharge: HOME | End: 2021-05-21
Attending: EMERGENCY MEDICINE | Admitting: EMERGENCY MEDICINE

## 2021-05-20 VITALS
HEIGHT: 72 IN | TEMPERATURE: 97.3 F | BODY MASS INDEX: 40.63 KG/M2 | WEIGHT: 300 LBS | RESPIRATION RATE: 20 BRPM | HEART RATE: 73 BPM | SYSTOLIC BLOOD PRESSURE: 133 MMHG | OXYGEN SATURATION: 96 % | DIASTOLIC BLOOD PRESSURE: 75 MMHG

## 2021-05-20 DIAGNOSIS — U07.1 COVID-19: Primary | ICD-10-CM

## 2021-05-20 LAB
ALBUMIN SERPL-MCNC: 4.39 G/DL (ref 3.5–5.2)
ALBUMIN/GLOB SERPL: 1.1 G/DL
ALP SERPL-CCNC: 85 U/L (ref 39–117)
ALT SERPL W P-5'-P-CCNC: 34 U/L (ref 1–41)
ANION GAP SERPL CALCULATED.3IONS-SCNC: 11 MMOL/L (ref 5–15)
AST SERPL-CCNC: 30 U/L (ref 1–40)
BACTERIA UR QL AUTO: ABNORMAL /HPF
BASOPHILS # BLD AUTO: 0.02 10*3/MM3 (ref 0–0.2)
BASOPHILS NFR BLD AUTO: 0.3 % (ref 0–1.5)
BILIRUB SERPL-MCNC: 0.7 MG/DL (ref 0–1.2)
BILIRUB UR QL STRIP: ABNORMAL
BUN SERPL-MCNC: 8 MG/DL (ref 6–20)
BUN/CREAT SERPL: 7.8 (ref 7–25)
CALCIUM SPEC-SCNC: 9.3 MG/DL (ref 8.6–10.5)
CHLORIDE SERPL-SCNC: 99 MMOL/L (ref 98–107)
CLARITY UR: CLEAR
CO2 SERPL-SCNC: 27 MMOL/L (ref 22–29)
COLOR UR: ABNORMAL
CREAT SERPL-MCNC: 1.02 MG/DL (ref 0.76–1.27)
CRP SERPL-MCNC: 4.7 MG/DL (ref 0–0.5)
D DIMER PPP FEU-MCNC: 0.3 MCGFEU/ML (ref 0–0.5)
D-LACTATE SERPL-SCNC: 0.9 MMOL/L (ref 0.5–2)
DEPRECATED RDW RBC AUTO: 40.3 FL (ref 37–54)
EOSINOPHIL # BLD AUTO: 0.01 10*3/MM3 (ref 0–0.4)
EOSINOPHIL NFR BLD AUTO: 0.1 % (ref 0.3–6.2)
ERYTHROCYTE [DISTWIDTH] IN BLOOD BY AUTOMATED COUNT: 12.2 % (ref 12.3–15.4)
ERYTHROCYTE [SEDIMENTATION RATE] IN BLOOD: 10 MM/HR (ref 0–15)
FERRITIN SERPL-MCNC: 1415 NG/ML (ref 30–400)
GFR SERPL CREATININE-BSD FRML MDRD: 83 ML/MIN/1.73
GLOBULIN UR ELPH-MCNC: 3.9 GM/DL
GLUCOSE SERPL-MCNC: 103 MG/DL (ref 65–99)
GLUCOSE UR STRIP-MCNC: NEGATIVE MG/DL
HCT VFR BLD AUTO: 50 % (ref 37.5–51)
HGB BLD-MCNC: 16.7 G/DL (ref 13–17.7)
HGB UR QL STRIP.AUTO: NEGATIVE
HOLD SPECIMEN: NORMAL
HOLD SPECIMEN: NORMAL
HYALINE CASTS UR QL AUTO: ABNORMAL /LPF
IMM GRANULOCYTES # BLD AUTO: 0.02 10*3/MM3 (ref 0–0.05)
IMM GRANULOCYTES NFR BLD AUTO: 0.3 % (ref 0–0.5)
KETONES UR QL STRIP: ABNORMAL
LDH SERPL-CCNC: 233 U/L (ref 135–225)
LEUKOCYTE ESTERASE UR QL STRIP.AUTO: NEGATIVE
LYMPHOCYTES # BLD AUTO: 0.91 10*3/MM3 (ref 0.7–3.1)
LYMPHOCYTES NFR BLD AUTO: 13 % (ref 19.6–45.3)
MCH RBC QN AUTO: 30 PG (ref 26.6–33)
MCHC RBC AUTO-ENTMCNC: 33.4 G/DL (ref 31.5–35.7)
MCV RBC AUTO: 89.9 FL (ref 79–97)
MONOCYTES # BLD AUTO: 0.99 10*3/MM3 (ref 0.1–0.9)
MONOCYTES NFR BLD AUTO: 14.1 % (ref 5–12)
NEUTROPHILS NFR BLD AUTO: 5.05 10*3/MM3 (ref 1.7–7)
NEUTROPHILS NFR BLD AUTO: 72.2 % (ref 42.7–76)
NITRITE UR QL STRIP: NEGATIVE
NRBC BLD AUTO-RTO: 0 /100 WBC (ref 0–0.2)
NT-PROBNP SERPL-MCNC: 8.9 PG/ML (ref 0–450)
PH UR STRIP.AUTO: 6 [PH] (ref 5–8)
PLATELET # BLD AUTO: 162 10*3/MM3 (ref 140–450)
PMV BLD AUTO: 10.5 FL (ref 6–12)
POTASSIUM SERPL-SCNC: 4.1 MMOL/L (ref 3.5–5.2)
PROT SERPL-MCNC: 8.3 G/DL (ref 6–8.5)
PROT UR QL STRIP: ABNORMAL
RBC # BLD AUTO: 5.56 10*6/MM3 (ref 4.14–5.8)
RBC # UR: ABNORMAL /HPF
REF LAB TEST METHOD: ABNORMAL
SODIUM SERPL-SCNC: 137 MMOL/L (ref 136–145)
SP GR UR STRIP: 1.02 (ref 1–1.03)
SQUAMOUS #/AREA URNS HPF: ABNORMAL /HPF
TROPONIN T SERPL-MCNC: <0.01 NG/ML (ref 0–0.03)
TROPONIN T SERPL-MCNC: <0.01 NG/ML (ref 0–0.03)
UROBILINOGEN UR QL STRIP: ABNORMAL
WBC # BLD AUTO: 7 10*3/MM3 (ref 3.4–10.8)
WBC UR QL AUTO: ABNORMAL /HPF
WHOLE BLOOD HOLD SPECIMEN: NORMAL
WHOLE BLOOD HOLD SPECIMEN: NORMAL

## 2021-05-20 PROCEDURE — 84484 ASSAY OF TROPONIN QUANT: CPT | Performed by: EMERGENCY MEDICINE

## 2021-05-20 PROCEDURE — 94640 AIRWAY INHALATION TREATMENT: CPT

## 2021-05-20 PROCEDURE — 71045 X-RAY EXAM CHEST 1 VIEW: CPT

## 2021-05-20 PROCEDURE — 99284 EMERGENCY DEPT VISIT MOD MDM: CPT

## 2021-05-20 PROCEDURE — 93005 ELECTROCARDIOGRAM TRACING: CPT | Performed by: EMERGENCY MEDICINE

## 2021-05-20 PROCEDURE — 25010000002 KETOROLAC TROMETHAMINE PER 15 MG: Performed by: EMERGENCY MEDICINE

## 2021-05-20 PROCEDURE — 85025 COMPLETE CBC W/AUTO DIFF WBC: CPT | Performed by: EMERGENCY MEDICINE

## 2021-05-20 PROCEDURE — 82728 ASSAY OF FERRITIN: CPT | Performed by: EMERGENCY MEDICINE

## 2021-05-20 PROCEDURE — 85379 FIBRIN DEGRADATION QUANT: CPT | Performed by: EMERGENCY MEDICINE

## 2021-05-20 PROCEDURE — 86140 C-REACTIVE PROTEIN: CPT | Performed by: EMERGENCY MEDICINE

## 2021-05-20 PROCEDURE — 25010000002 DEXAMETHASONE PER 1 MG: Performed by: EMERGENCY MEDICINE

## 2021-05-20 PROCEDURE — 25010000002 ONDANSETRON PER 1 MG: Performed by: EMERGENCY MEDICINE

## 2021-05-20 PROCEDURE — 96374 THER/PROPH/DIAG INJ IV PUSH: CPT

## 2021-05-20 PROCEDURE — 85652 RBC SED RATE AUTOMATED: CPT | Performed by: EMERGENCY MEDICINE

## 2021-05-20 PROCEDURE — 81001 URINALYSIS AUTO W/SCOPE: CPT | Performed by: EMERGENCY MEDICINE

## 2021-05-20 PROCEDURE — 96375 TX/PRO/DX INJ NEW DRUG ADDON: CPT

## 2021-05-20 PROCEDURE — 93010 ELECTROCARDIOGRAM REPORT: CPT | Performed by: INTERNAL MEDICINE

## 2021-05-20 PROCEDURE — 83615 LACTATE (LD) (LDH) ENZYME: CPT | Performed by: EMERGENCY MEDICINE

## 2021-05-20 PROCEDURE — 83605 ASSAY OF LACTIC ACID: CPT | Performed by: EMERGENCY MEDICINE

## 2021-05-20 PROCEDURE — 80053 COMPREHEN METABOLIC PANEL: CPT | Performed by: EMERGENCY MEDICINE

## 2021-05-20 PROCEDURE — 83880 ASSAY OF NATRIURETIC PEPTIDE: CPT | Performed by: EMERGENCY MEDICINE

## 2021-05-20 RX ORDER — SODIUM CHLORIDE 0.9 % (FLUSH) 0.9 %
10 SYRINGE (ML) INJECTION AS NEEDED
Status: DISCONTINUED | OUTPATIENT
Start: 2021-05-20 | End: 2021-05-21 | Stop reason: HOSPADM

## 2021-05-20 RX ORDER — ALBUTEROL SULFATE 90 UG/1
2 AEROSOL, METERED RESPIRATORY (INHALATION) ONCE
Status: COMPLETED | OUTPATIENT
Start: 2021-05-20 | End: 2021-05-20

## 2021-05-20 RX ORDER — DEXAMETHASONE SODIUM PHOSPHATE 10 MG/ML
10 INJECTION INTRAMUSCULAR; INTRAVENOUS ONCE
Status: COMPLETED | OUTPATIENT
Start: 2021-05-20 | End: 2021-05-20

## 2021-05-20 RX ORDER — KETOROLAC TROMETHAMINE 30 MG/ML
30 INJECTION, SOLUTION INTRAMUSCULAR; INTRAVENOUS ONCE
Status: COMPLETED | OUTPATIENT
Start: 2021-05-20 | End: 2021-05-20

## 2021-05-20 RX ORDER — ONDANSETRON 2 MG/ML
4 INJECTION INTRAMUSCULAR; INTRAVENOUS ONCE
Status: COMPLETED | OUTPATIENT
Start: 2021-05-20 | End: 2021-05-20

## 2021-05-20 RX ADMIN — DEXAMETHASONE SODIUM PHOSPHATE 10 MG: 10 INJECTION INTRAMUSCULAR; INTRAVENOUS at 21:38

## 2021-05-20 RX ADMIN — ONDANSETRON 4 MG: 2 INJECTION INTRAMUSCULAR; INTRAVENOUS at 21:38

## 2021-05-20 RX ADMIN — ALBUTEROL SULFATE 2 PUFF: 90 AEROSOL, METERED RESPIRATORY (INHALATION) at 21:39

## 2021-05-20 RX ADMIN — KETOROLAC TROMETHAMINE 30 MG: 30 INJECTION, SOLUTION INTRAMUSCULAR at 21:38

## 2021-05-20 RX ADMIN — SODIUM CHLORIDE 1000 ML: 9 INJECTION, SOLUTION INTRAVENOUS at 21:38

## 2021-05-21 LAB
QT INTERVAL: 362 MS
QTC INTERVAL: 427 MS

## 2021-05-21 RX ORDER — DEXAMETHASONE 6 MG/1
6 TABLET ORAL DAILY
Qty: 10 TABLET | Refills: 0 | Status: ON HOLD | OUTPATIENT
Start: 2021-05-21 | End: 2021-05-24

## 2021-05-21 RX ORDER — DEXAMETHASONE 6 MG/1
6 TABLET ORAL DAILY
Qty: 10 TABLET | Refills: 0 | Status: SHIPPED | OUTPATIENT
Start: 2021-05-21 | End: 2021-05-21 | Stop reason: SDUPTHER

## 2021-05-22 NOTE — ED PROVIDER NOTES
Subjective     History provided by:  Patient   used: No    Shortness of Breath  Severity:  Mild  Onset quality:  Gradual  Timing:  Sporadic  Progression:  Unchanged  Chronicity:  New  Context: activity and URI    Context: not animal exposure, not emotional upset, not fumes, not known allergens, not occupational exposure, not pollens, not smoke exposure, not strong odors and not weather changes    Context comment:  Patient is Covid positive.  Diagnosed earlier today.  Relieved by:  Nothing  Worsened by:  Activity, coughing, deep breathing, exertion and movement  Ineffective treatments:  None tried  Associated symptoms: cough and fever    Associated symptoms: no abdominal pain, no chest pain, no claudication, no diaphoresis, no ear pain, no headaches, no hemoptysis, no neck pain, no PND, no rash, no sore throat, no sputum production, no syncope, no swollen glands, no vomiting and no wheezing    Risk factors: no recent alcohol use, no family hx of DVT, no hx of cancer, no hx of PE/DVT, no obesity, no oral contraceptive use, no prolonged immobilization, no recent surgery and no tobacco use        Review of Systems   Constitutional: Positive for fever. Negative for activity change, appetite change, chills, diaphoresis and fatigue.   HENT: Negative for congestion, ear pain and sore throat.    Eyes: Negative for redness.   Respiratory: Positive for cough and shortness of breath. Negative for hemoptysis, sputum production, chest tightness and wheezing.    Cardiovascular: Negative for chest pain, palpitations, claudication, leg swelling, syncope and PND.   Gastrointestinal: Negative for abdominal pain, diarrhea, nausea and vomiting.   Genitourinary: Negative for dysuria and urgency.   Musculoskeletal: Negative for arthralgias, back pain, myalgias and neck pain.   Skin: Negative for pallor, rash and wound.   Neurological: Negative for dizziness, speech difficulty, weakness and headaches.    Psychiatric/Behavioral: Negative for agitation, behavioral problems, confusion and decreased concentration.   All other systems reviewed and are negative.      Past Medical History:   Diagnosis Date   • Chest pain    • COVID-19        Allergies   Allergen Reactions   • Penicillins    • Phenergan [Promethazine Hcl]    • Vistaril [Hydroxyzine Hcl]        Past Surgical History:   Procedure Laterality Date   • APPENDECTOMY     • CHOLECYSTECTOMY     • COLONOSCOPY N/A 2017    Procedure: COLONOSCOPY CPT CODE: 62741;  Surgeon: Clinton Bernal III, MD;  Location: [facility];  Service:    • ENDOSCOPY N/A 2017    Procedure: ESOPHAGOGASTRODUODENOSCOPY WITH DILATATION CPT CODE: 97511;  Surgeon: Clinton Bernal III, MD;  Location: [facility] OR;  Service:    • KNEE SURGERY         Family History   Problem Relation Age of Onset   • No Known Problems Mother    • No Known Problems Father    • No Known Problems Sister    • No Known Problems Brother    • No Known Problems Son    • No Known Problems Daughter    • Heart disease Maternal Grandmother    • No Known Problems Maternal Grandfather    • No Known Problems Paternal Grandmother    • No Known Problems Paternal Grandfather    • No Known Problems Cousin    • Rheum arthritis Neg Hx    • Osteoarthritis Neg Hx    • Asthma Neg Hx    • Diabetes Neg Hx    • Heart failure Neg Hx    • Hyperlipidemia Neg Hx    • Hypertension Neg Hx    • Migraines Neg Hx    • Rashes / Skin problems Neg Hx    • Seizures Neg Hx    • Stroke Neg Hx    • Thyroid disease Neg Hx        Social History     Socioeconomic History   • Marital status:      Spouse name: Not on file   • Number of children: Not on file   • Years of education: Not on file   • Highest education level: Not on file   Tobacco Use   • Smoking status: Former Smoker     Packs/day: 1.00     Types: Cigarettes     Quit date: 2017     Years since quittin.1   • Smokeless tobacco: Never Used   Substance and Sexual Activity   •  Alcohol use: No   • Drug use: No   • Sexual activity: Defer           Objective   Physical Exam  Vitals and nursing note reviewed.   Constitutional:       General: He is not in acute distress.     Appearance: Normal appearance. He is well-developed. He is not toxic-appearing or diaphoretic.   HENT:      Head: Normocephalic and atraumatic.      Right Ear: External ear normal.      Left Ear: External ear normal.      Nose: Nose normal.      Mouth/Throat:      Pharynx: No oropharyngeal exudate.      Tonsils: No tonsillar exudate.   Eyes:      General: Lids are normal.      Conjunctiva/sclera: Conjunctivae normal.      Pupils: Pupils are equal, round, and reactive to light.   Neck:      Thyroid: No thyromegaly.   Cardiovascular:      Rate and Rhythm: Normal rate and regular rhythm.      Pulses: Normal pulses.      Heart sounds: Normal heart sounds, S1 normal and S2 normal.   Pulmonary:      Effort: Pulmonary effort is normal. No tachypnea or respiratory distress.      Breath sounds: Normal breath sounds. No decreased breath sounds, wheezing or rales.   Chest:      Chest wall: No tenderness.   Abdominal:      General: Bowel sounds are normal. There is no distension.      Palpations: Abdomen is soft.      Tenderness: There is no abdominal tenderness. There is no guarding or rebound.   Musculoskeletal:         General: No tenderness or deformity. Normal range of motion.      Cervical back: Full passive range of motion without pain, normal range of motion and neck supple.   Lymphadenopathy:      Cervical: No cervical adenopathy.   Skin:     General: Skin is warm and dry.      Coloration: Skin is not pale.      Findings: No erythema or rash.   Neurological:      Mental Status: He is alert and oriented to person, place, and time.      GCS: GCS eye subscore is 4. GCS verbal subscore is 5. GCS motor subscore is 6.      Cranial Nerves: No cranial nerve deficit.      Sensory: No sensory deficit.   Psychiatric:         Speech:  Speech normal.         Behavior: Behavior normal.         Thought Content: Thought content normal.         Judgment: Judgment normal.         Procedures           ED Course  ED Course as of May 21 2118   Thu May 20, 2021   2202 No acute ST elevation.  Normal sinus rhythm  Normal ECG  When compared with ECG of 07-DEC-2017 11:58,  No significant change was found  Vent. rate 84 BPM  NC interval 180 ms  QRS duration 86 ms  QT/QTcB 362/427 ms  P-R-T axes 39 12 49   ECG 12 Lead [ES]   Fri May 21, 2021   2117 IMPRESSION:  No acute disease.   XR Chest 1 View [ES]   2117 Patient did not want any further treatment at this time, and reports feeling better prior to discharge.  Patient is to immediately return the emergency department with any worsening symptoms.    [ES]      ED Course User Index  [ES] Avelino Orona MD                                           MDM  Number of Diagnoses or Management Options  COVID-19: new and requires workup     Amount and/or Complexity of Data Reviewed  Clinical lab tests: reviewed and ordered  Tests in the radiology section of CPT®: reviewed and ordered  Tests in the medicine section of CPT®: reviewed and ordered  Review and summarize past medical records: yes  Independent visualization of images, tracings, or specimens: yes    Risk of Complications, Morbidity, and/or Mortality  Presenting problems: moderate  Diagnostic procedures: moderate  Management options: moderate    Patient Progress  Patient progress: stable      Final diagnoses:   COVID-19       ED Disposition  ED Disposition     ED Disposition Condition Comment    Discharge Stable           Kreis, Samuel Duane, MD  [address] [place] [address] [address] [place] [address] [phone]    Schedule an appointment as soon as possible for a visit in 1 day  REEVALUATE         Medication List      New Prescriptions    dexamethasone 6 MG tablet  Commonly known as: DECADRON  Take 1 tablet by mouth Daily for 10 days.     HYDROcod Polst-CPM  Polst ER 10-8 MG/5ML ER suspension  Commonly known as: Tussionex Pennkinetic ER  Take 5 mL by mouth Every 12 (Twelve) Hours As Needed for Cough.           Where to Get Your Medications      These medications were sent to [facility] [place] - [address] - [phone] PH - [phone]   [address] [address]    Phone: [phone]   · dexamethasone 6 MG tablet  · HYDROcod Polst-CPM Polst ER 10-8 MG/5ML ER suspension          Avelino Orona MD  05/21/21 2201

## 2021-05-23 ENCOUNTER — HOSPITAL ENCOUNTER (INPATIENT)
Facility: HOSPITAL | Age: 36
LOS: 6 days | Discharge: HOME | End: 2021-05-30
Attending: FAMILY MEDICINE | Admitting: INTERNAL MEDICINE

## 2021-05-23 ENCOUNTER — APPOINTMENT (OUTPATIENT)
Dept: GENERAL RADIOLOGY | Facility: HOSPITAL | Age: 36
End: 2021-05-23

## 2021-05-23 DIAGNOSIS — J12.82 PNEUMONIA DUE TO COVID-19 VIRUS: Primary | ICD-10-CM

## 2021-05-23 DIAGNOSIS — J96.01 ACUTE RESPIRATORY FAILURE WITH HYPOXIA (HCC): ICD-10-CM

## 2021-05-23 DIAGNOSIS — U07.1 PNEUMONIA DUE TO COVID-19 VIRUS: Primary | ICD-10-CM

## 2021-05-23 LAB
A-A DO2: 54.2 MMHG (ref 0–300)
ALBUMIN SERPL-MCNC: 4.05 G/DL (ref 3.5–5.2)
ALBUMIN/GLOB SERPL: 0.9 G/DL
ALP SERPL-CCNC: 83 U/L (ref 39–117)
ALT SERPL W P-5'-P-CCNC: 45 U/L (ref 1–41)
ANION GAP SERPL CALCULATED.3IONS-SCNC: 11.4 MMOL/L (ref 5–15)
APTT PPP: 33.7 SECONDS (ref 25.6–35.3)
ARTERIAL PATENCY WRIST A: POSITIVE
AST SERPL-CCNC: 31 U/L (ref 1–40)
ATMOSPHERIC PRESS: 733 MMHG
BASE EXCESS BLDA CALC-SCNC: 1.7 MMOL/L (ref 0–2)
BASOPHILS # BLD AUTO: 0.03 10*3/MM3 (ref 0–0.2)
BASOPHILS NFR BLD AUTO: 0.2 % (ref 0–1.5)
BDY SITE: ABNORMAL
BILIRUB SERPL-MCNC: 0.8 MG/DL (ref 0–1.2)
BODY TEMPERATURE: 0 C
BUN SERPL-MCNC: 12 MG/DL (ref 6–20)
BUN/CREAT SERPL: 11.1 (ref 7–25)
CALCIUM SPEC-SCNC: 9 MG/DL (ref 8.6–10.5)
CHLORIDE SERPL-SCNC: 98 MMOL/L (ref 98–107)
CO2 BLDA-SCNC: 26.1 MMOL/L (ref 22–33)
CO2 SERPL-SCNC: 27.6 MMOL/L (ref 22–29)
COHGB MFR BLD: 1.5 % (ref 0–5)
CREAT SERPL-MCNC: 1.08 MG/DL (ref 0.76–1.27)
CRP SERPL-MCNC: 17.1 MG/DL (ref 0–0.5)
DEPRECATED RDW RBC AUTO: 41.3 FL (ref 37–54)
EOSINOPHIL # BLD AUTO: 0 10*3/MM3 (ref 0–0.4)
EOSINOPHIL NFR BLD AUTO: 0 % (ref 0.3–6.2)
ERYTHROCYTE [DISTWIDTH] IN BLOOD BY AUTOMATED COUNT: 12.4 % (ref 12.3–15.4)
ERYTHROCYTE [SEDIMENTATION RATE] IN BLOOD: 15 MM/HR (ref 0–15)
GFR SERPL CREATININE-BSD FRML MDRD: 77 ML/MIN/1.73
GLOBULIN UR ELPH-MCNC: 4.5 GM/DL
GLUCOSE SERPL-MCNC: 141 MG/DL (ref 65–99)
HCO3 BLDA-SCNC: 25.1 MMOL/L (ref 20–26)
HCT VFR BLD AUTO: 50.6 % (ref 37.5–51)
HCT VFR BLD CALC: 50.5 % (ref 38–51)
HGB BLD-MCNC: 16.9 G/DL (ref 13–17.7)
HGB BLDA-MCNC: 16.5 G/DL (ref 14–18)
IMM GRANULOCYTES # BLD AUTO: 0.09 10*3/MM3 (ref 0–0.05)
IMM GRANULOCYTES NFR BLD AUTO: 0.7 % (ref 0–0.5)
INHALED O2 CONCENTRATION: 21 %
INR PPP: 1.01 (ref 0.9–1.1)
LIPASE SERPL-CCNC: 32 U/L (ref 13–60)
LYMPHOCYTES # BLD AUTO: 0.6 10*3/MM3 (ref 0.7–3.1)
LYMPHOCYTES NFR BLD AUTO: 4.7 % (ref 19.6–45.3)
Lab: ABNORMAL
Lab: ABNORMAL
MCH RBC QN AUTO: 30.2 PG (ref 26.6–33)
MCHC RBC AUTO-ENTMCNC: 33.4 G/DL (ref 31.5–35.7)
MCV RBC AUTO: 90.4 FL (ref 79–97)
METHGB BLD QL: 0 % (ref 0–3)
MODALITY: ABNORMAL
MONOCYTES # BLD AUTO: 0.81 10*3/MM3 (ref 0.1–0.9)
MONOCYTES NFR BLD AUTO: 6.4 % (ref 5–12)
NEUTROPHILS NFR BLD AUTO: 11.11 10*3/MM3 (ref 1.7–7)
NEUTROPHILS NFR BLD AUTO: 88 % (ref 42.7–76)
NOTE: ABNORMAL
NOTIFIED BY: ABNORMAL
NOTIFIED WHO: ABNORMAL
NRBC BLD AUTO-RTO: 0 /100 WBC (ref 0–0.2)
NT-PROBNP SERPL-MCNC: 34 PG/ML (ref 0–450)
OXYHGB MFR BLDV: 86.8 % (ref 94–99)
PCO2 BLDA: 35 MM HG (ref 35–45)
PCO2 TEMP ADJ BLD: ABNORMAL MM[HG]
PH BLDA: 7.46 PH UNITS (ref 7.35–7.45)
PH, TEMP CORRECTED: ABNORMAL
PLATELET # BLD AUTO: 211 10*3/MM3 (ref 140–450)
PMV BLD AUTO: 10.4 FL (ref 6–12)
PO2 BLDA: 50.3 MM HG (ref 83–108)
PO2 TEMP ADJ BLD: ABNORMAL MM[HG]
POTASSIUM SERPL-SCNC: 4.2 MMOL/L (ref 3.5–5.2)
PROT SERPL-MCNC: 8.5 G/DL (ref 6–8.5)
PROTHROMBIN TIME: 13.2 SECONDS (ref 11.9–14.1)
RBC # BLD AUTO: 5.6 10*6/MM3 (ref 4.14–5.8)
SAO2 % BLDCOA: 88 % (ref 94–99)
SODIUM SERPL-SCNC: 137 MMOL/L (ref 136–145)
TROPONIN T SERPL-MCNC: <0.01 NG/ML (ref 0–0.03)
VENTILATOR MODE: ABNORMAL
WBC # BLD AUTO: 12.64 10*3/MM3 (ref 3.4–10.8)

## 2021-05-23 PROCEDURE — 83050 HGB METHEMOGLOBIN QUAN: CPT

## 2021-05-23 PROCEDURE — 82728 ASSAY OF FERRITIN: CPT | Performed by: FAMILY MEDICINE

## 2021-05-23 PROCEDURE — 84484 ASSAY OF TROPONIN QUANT: CPT | Performed by: FAMILY MEDICINE

## 2021-05-23 PROCEDURE — 83880 ASSAY OF NATRIURETIC PEPTIDE: CPT | Performed by: FAMILY MEDICINE

## 2021-05-23 PROCEDURE — XW033E5 INTRODUCTION OF REMDESIVIR ANTI-INFECTIVE INTO PERIPHERAL VEIN, PERCUTANEOUS APPROACH, NEW TECHNOLOGY GROUP 5: ICD-10-PCS | Performed by: HOSPITALIST

## 2021-05-23 PROCEDURE — 71045 X-RAY EXAM CHEST 1 VIEW: CPT

## 2021-05-23 PROCEDURE — 93005 ELECTROCARDIOGRAM TRACING: CPT | Performed by: FAMILY MEDICINE

## 2021-05-23 PROCEDURE — 85025 COMPLETE CBC W/AUTO DIFF WBC: CPT | Performed by: FAMILY MEDICINE

## 2021-05-23 PROCEDURE — 80053 COMPREHEN METABOLIC PANEL: CPT | Performed by: FAMILY MEDICINE

## 2021-05-23 PROCEDURE — 85610 PROTHROMBIN TIME: CPT | Performed by: FAMILY MEDICINE

## 2021-05-23 PROCEDURE — 82375 ASSAY CARBOXYHB QUANT: CPT

## 2021-05-23 PROCEDURE — 99284 EMERGENCY DEPT VISIT MOD MDM: CPT

## 2021-05-23 PROCEDURE — 86140 C-REACTIVE PROTEIN: CPT | Performed by: FAMILY MEDICINE

## 2021-05-23 PROCEDURE — 83690 ASSAY OF LIPASE: CPT | Performed by: FAMILY MEDICINE

## 2021-05-23 PROCEDURE — 36600 WITHDRAWAL OF ARTERIAL BLOOD: CPT

## 2021-05-23 PROCEDURE — 85652 RBC SED RATE AUTOMATED: CPT | Performed by: FAMILY MEDICINE

## 2021-05-23 PROCEDURE — 85730 THROMBOPLASTIN TIME PARTIAL: CPT | Performed by: FAMILY MEDICINE

## 2021-05-23 PROCEDURE — 82805 BLOOD GASES W/O2 SATURATION: CPT

## 2021-05-23 PROCEDURE — 83615 LACTATE (LD) (LDH) ENZYME: CPT | Performed by: FAMILY MEDICINE

## 2021-05-23 RX ORDER — SODIUM CHLORIDE 0.9 % (FLUSH) 0.9 %
10 SYRINGE (ML) INJECTION AS NEEDED
Status: DISCONTINUED | OUTPATIENT
Start: 2021-05-23 | End: 2021-05-30 | Stop reason: HOSPADM

## 2021-05-24 ENCOUNTER — APPOINTMENT (OUTPATIENT)
Dept: CT IMAGING | Facility: HOSPITAL | Age: 36
End: 2021-05-24

## 2021-05-24 PROBLEM — U07.1 PNEUMONIA DUE TO COVID-19 VIRUS: Status: ACTIVE | Noted: 2021-05-24

## 2021-05-24 PROBLEM — J12.82 PNEUMONIA DUE TO COVID-19 VIRUS: Status: ACTIVE | Noted: 2021-05-24

## 2021-05-24 LAB
6-ACETYL MORPHINE: NEGATIVE
A-A DO2: 274 MMHG (ref 0–300)
ALBUMIN SERPL-MCNC: 3.67 G/DL (ref 3.5–5.2)
ALBUMIN/GLOB SERPL: 0.9 G/DL
ALP SERPL-CCNC: 70 U/L (ref 39–117)
ALT SERPL W P-5'-P-CCNC: 40 U/L (ref 1–41)
AMPHET+METHAMPHET UR QL: NEGATIVE
ANION GAP SERPL CALCULATED.3IONS-SCNC: 12 MMOL/L (ref 5–15)
ARTERIAL PATENCY WRIST A: ABNORMAL
AST SERPL-CCNC: 25 U/L (ref 1–40)
ATMOSPHERIC PRESS: 730 MMHG
B PARAPERT DNA SPEC QL NAA+PROBE: NOT DETECTED
B PERT DNA SPEC QL NAA+PROBE: NOT DETECTED
BACTERIA UR QL AUTO: NORMAL /HPF
BARBITURATES UR QL SCN: NEGATIVE
BASE EXCESS BLDA CALC-SCNC: 3.2 MMOL/L (ref 0–2)
BASOPHILS # BLD AUTO: 0.03 10*3/MM3 (ref 0–0.2)
BASOPHILS NFR BLD AUTO: 0.2 % (ref 0–1.5)
BDY SITE: ABNORMAL
BENZODIAZ UR QL SCN: NEGATIVE
BILIRUB SERPL-MCNC: 1 MG/DL (ref 0–1.2)
BILIRUB UR QL STRIP: NEGATIVE
BODY TEMPERATURE: 0 C
BUN SERPL-MCNC: 14 MG/DL (ref 6–20)
BUN/CREAT SERPL: 15.9 (ref 7–25)
BUPRENORPHINE SERPL-MCNC: NEGATIVE NG/ML
C PNEUM DNA NPH QL NAA+NON-PROBE: NOT DETECTED
CALCIUM SPEC-SCNC: 8.5 MG/DL (ref 8.6–10.5)
CANNABINOIDS SERPL QL: NEGATIVE
CHLORIDE SERPL-SCNC: 99 MMOL/L (ref 98–107)
CHOLEST SERPL-MCNC: 121 MG/DL (ref 0–200)
CLARITY UR: CLEAR
CO2 BLDA-SCNC: 28.8 MMOL/L (ref 22–33)
CO2 SERPL-SCNC: 24 MMOL/L (ref 22–29)
COCAINE UR QL: NEGATIVE
COHGB MFR BLD: 1.5 % (ref 0–5)
COLOR UR: ABNORMAL
CREAT SERPL-MCNC: 0.88 MG/DL (ref 0.76–1.27)
CRP SERPL-MCNC: 19.86 MG/DL (ref 0–0.5)
D DIMER PPP FEU-MCNC: 0.28 MCGFEU/ML (ref 0–0.5)
D DIMER PPP FEU-MCNC: 0.33 MCGFEU/ML (ref 0–0.5)
DEPRECATED RDW RBC AUTO: 41.9 FL (ref 37–54)
EOSINOPHIL # BLD AUTO: 0 10*3/MM3 (ref 0–0.4)
EOSINOPHIL NFR BLD AUTO: 0 % (ref 0.3–6.2)
ERYTHROCYTE [DISTWIDTH] IN BLOOD BY AUTOMATED COUNT: 12.4 % (ref 12.3–15.4)
FERRITIN SERPL-MCNC: 1317 NG/ML (ref 30–400)
FERRITIN SERPL-MCNC: 1411 NG/ML (ref 30–400)
FLUAV SUBTYP SPEC NAA+PROBE: NOT DETECTED
FLUBV RNA ISLT QL NAA+PROBE: NOT DETECTED
GAS FLOW AIRWAY: 9 LPM
GFR SERPL CREATININE-BSD FRML MDRD: 98 ML/MIN/1.73
GLOBULIN UR ELPH-MCNC: 3.9 GM/DL
GLUCOSE SERPL-MCNC: 141 MG/DL (ref 65–99)
GLUCOSE UR STRIP-MCNC: NEGATIVE MG/DL
HADV DNA SPEC NAA+PROBE: NOT DETECTED
HBA1C MFR BLD: 5.6 % (ref 4.8–5.6)
HCO3 BLDA-SCNC: 27.5 MMOL/L (ref 20–26)
HCOV 229E RNA SPEC QL NAA+PROBE: NOT DETECTED
HCOV HKU1 RNA SPEC QL NAA+PROBE: NOT DETECTED
HCOV NL63 RNA SPEC QL NAA+PROBE: NOT DETECTED
HCOV OC43 RNA SPEC QL NAA+PROBE: NOT DETECTED
HCT VFR BLD AUTO: 48.1 % (ref 37.5–51)
HCT VFR BLD CALC: 48.3 % (ref 38–51)
HDLC SERPL-MCNC: 37 MG/DL (ref 40–60)
HGB BLD-MCNC: 15.7 G/DL (ref 13–17.7)
HGB BLDA-MCNC: 15.8 G/DL (ref 14–18)
HGB UR QL STRIP.AUTO: NEGATIVE
HMPV RNA NPH QL NAA+NON-PROBE: NOT DETECTED
HPIV1 RNA SPEC QL NAA+PROBE: NOT DETECTED
HPIV2 RNA SPEC QL NAA+PROBE: NOT DETECTED
HPIV3 RNA NPH QL NAA+PROBE: NOT DETECTED
HPIV4 P GENE NPH QL NAA+PROBE: NOT DETECTED
HYALINE CASTS UR QL AUTO: NORMAL /LPF
IMM GRANULOCYTES # BLD AUTO: 0.12 10*3/MM3 (ref 0–0.05)
IMM GRANULOCYTES NFR BLD AUTO: 0.8 % (ref 0–0.5)
INHALED O2 CONCENTRATION: 56 %
KETONES UR QL STRIP: NEGATIVE
LDH SERPL-CCNC: 259 U/L (ref 135–225)
LDH SERPL-CCNC: 284 U/L (ref 135–225)
LDLC SERPL CALC-MCNC: 64 MG/DL (ref 0–100)
LDLC/HDLC SERPL: 1.67 {RATIO}
LEUKOCYTE ESTERASE UR QL STRIP.AUTO: NEGATIVE
LYMPHOCYTES # BLD AUTO: 0.67 10*3/MM3 (ref 0.7–3.1)
LYMPHOCYTES NFR BLD AUTO: 4.6 % (ref 19.6–45.3)
Lab: ABNORMAL
M PNEUMO IGG SER IA-ACNC: NOT DETECTED
MAGNESIUM SERPL-MCNC: 2.1 MG/DL (ref 1.6–2.6)
MCH RBC QN AUTO: 30 PG (ref 26.6–33)
MCHC RBC AUTO-ENTMCNC: 32.6 G/DL (ref 31.5–35.7)
MCV RBC AUTO: 91.8 FL (ref 79–97)
METHADONE UR QL SCN: NEGATIVE
METHGB BLD QL: <1 % (ref 0–3)
MODALITY: ABNORMAL
MONOCYTES # BLD AUTO: 1.03 10*3/MM3 (ref 0.1–0.9)
MONOCYTES NFR BLD AUTO: 7.1 % (ref 5–12)
NEUTROPHILS NFR BLD AUTO: 12.66 10*3/MM3 (ref 1.7–7)
NEUTROPHILS NFR BLD AUTO: 87.3 % (ref 42.7–76)
NITRITE UR QL STRIP: NEGATIVE
NOTE: ABNORMAL
NOTIFIED BY: ABNORMAL
NRBC BLD AUTO-RTO: 0 /100 WBC (ref 0–0.2)
OPIATES UR QL: POSITIVE
OXYCODONE UR QL SCN: NEGATIVE
OXYHGB MFR BLDV: 92.7 % (ref 94–99)
PCO2 BLDA: 40 MM HG (ref 35–45)
PCO2 TEMP ADJ BLD: ABNORMAL MM[HG]
PCP UR QL SCN: NEGATIVE
PH BLDA: 7.45 PH UNITS (ref 7.35–7.45)
PH UR STRIP.AUTO: 6.5 [PH] (ref 5–8)
PH, TEMP CORRECTED: ABNORMAL
PLATELET # BLD AUTO: 204 10*3/MM3 (ref 140–450)
PMV BLD AUTO: 10.6 FL (ref 6–12)
PO2 BLDA: 65.8 MM HG (ref 83–108)
PO2 TEMP ADJ BLD: ABNORMAL MM[HG]
POTASSIUM SERPL-SCNC: 4.2 MMOL/L (ref 3.5–5.2)
PROCALCITONIN SERPL-MCNC: 0.26 NG/ML (ref 0–0.25)
PROT SERPL-MCNC: 7.6 G/DL (ref 6–8.5)
PROT UR QL STRIP: ABNORMAL
QT INTERVAL: 332 MS
QTC INTERVAL: 421 MS
RBC # BLD AUTO: 5.24 10*6/MM3 (ref 4.14–5.8)
RBC # UR: NORMAL /HPF
REF LAB TEST METHOD: NORMAL
RHINOVIRUS RNA SPEC NAA+PROBE: DETECTED
RSV RNA NPH QL NAA+NON-PROBE: NOT DETECTED
SAO2 % BLDCOA: 94 % (ref 94–99)
SARS-COV-2 RNA RESP QL NAA+PROBE: DETECTED
SODIUM SERPL-SCNC: 135 MMOL/L (ref 136–145)
SP GR UR STRIP: 1.02 (ref 1–1.03)
SQUAMOUS #/AREA URNS HPF: NORMAL /HPF
TRIGL SERPL-MCNC: 111 MG/DL (ref 0–150)
TSH SERPL DL<=0.05 MIU/L-ACNC: 0.32 UIU/ML (ref 0.27–4.2)
UROBILINOGEN UR QL STRIP: ABNORMAL
VENTILATOR MODE: ABNORMAL
VLDLC SERPL-MCNC: 20 MG/DL (ref 5–40)
WBC # BLD AUTO: 14.51 10*3/MM3 (ref 3.4–10.8)
WBC UR QL AUTO: NORMAL /HPF

## 2021-05-24 PROCEDURE — 25010000002 FUROSEMIDE PER 20 MG: Performed by: HOSPITALIST

## 2021-05-24 PROCEDURE — 82805 BLOOD GASES W/O2 SATURATION: CPT

## 2021-05-24 PROCEDURE — 87040 BLOOD CULTURE FOR BACTERIA: CPT | Performed by: INTERNAL MEDICINE

## 2021-05-24 PROCEDURE — U0003 INFECTIOUS AGENT DETECTION BY NUCLEIC ACID (DNA OR RNA); SEVERE ACUTE RESPIRATORY SYNDROME CORONAVIRUS 2 (SARS-COV-2) (CORONAVIRUS DISEASE [COVID-19]), AMPLIFIED PROBE TECHNIQUE, MAKING USE OF HIGH THROUGHPUT TECHNOLOGIES AS DESCRIBED BY CMS-2020-01-R: HCPCS | Performed by: HOSPITALIST

## 2021-05-24 PROCEDURE — 36415 COLL VENOUS BLD VENIPUNCTURE: CPT

## 2021-05-24 PROCEDURE — 80307 DRUG TEST PRSMV CHEM ANLYZR: CPT | Performed by: FAMILY MEDICINE

## 2021-05-24 PROCEDURE — 85379 FIBRIN DEGRADATION QUANT: CPT | Performed by: INTERNAL MEDICINE

## 2021-05-24 PROCEDURE — 25010000002 ENOXAPARIN PER 10 MG: Performed by: INTERNAL MEDICINE

## 2021-05-24 PROCEDURE — 83050 HGB METHEMOGLOBIN QUAN: CPT

## 2021-05-24 PROCEDURE — 87633 RESP VIRUS 12-25 TARGETS: CPT | Performed by: HOSPITALIST

## 2021-05-24 PROCEDURE — 25010000002 AZITHROMYCIN PER 500 MG: Performed by: HOSPITALIST

## 2021-05-24 PROCEDURE — 25010000002 ONDANSETRON PER 1 MG: Performed by: FAMILY MEDICINE

## 2021-05-24 PROCEDURE — 83615 LACTATE (LD) (LDH) ENZYME: CPT | Performed by: INTERNAL MEDICINE

## 2021-05-24 PROCEDURE — 82728 ASSAY OF FERRITIN: CPT | Performed by: INTERNAL MEDICINE

## 2021-05-24 PROCEDURE — 36600 WITHDRAWAL OF ARTERIAL BLOOD: CPT

## 2021-05-24 PROCEDURE — 84443 ASSAY THYROID STIM HORMONE: CPT | Performed by: HOSPITALIST

## 2021-05-24 PROCEDURE — 80061 LIPID PANEL: CPT | Performed by: HOSPITALIST

## 2021-05-24 PROCEDURE — 81001 URINALYSIS AUTO W/SCOPE: CPT | Performed by: FAMILY MEDICINE

## 2021-05-24 PROCEDURE — 83735 ASSAY OF MAGNESIUM: CPT | Performed by: HOSPITALIST

## 2021-05-24 PROCEDURE — 86140 C-REACTIVE PROTEIN: CPT | Performed by: PHYSICIAN ASSISTANT

## 2021-05-24 PROCEDURE — 85025 COMPLETE CBC W/AUTO DIFF WBC: CPT | Performed by: INTERNAL MEDICINE

## 2021-05-24 PROCEDURE — 0 IOPAMIDOL PER 1 ML: Performed by: FAMILY MEDICINE

## 2021-05-24 PROCEDURE — 94799 UNLISTED PULMONARY SVC/PX: CPT

## 2021-05-24 PROCEDURE — 83036 HEMOGLOBIN GLYCOSYLATED A1C: CPT | Performed by: HOSPITALIST

## 2021-05-24 PROCEDURE — 85379 FIBRIN DEGRADATION QUANT: CPT | Performed by: FAMILY MEDICINE

## 2021-05-24 PROCEDURE — 71275 CT ANGIOGRAPHY CHEST: CPT

## 2021-05-24 PROCEDURE — 25010000002 DEXAMETHASONE PER 1 MG: Performed by: FAMILY MEDICINE

## 2021-05-24 PROCEDURE — 80053 COMPREHEN METABOLIC PANEL: CPT | Performed by: INTERNAL MEDICINE

## 2021-05-24 PROCEDURE — 82375 ASSAY CARBOXYHB QUANT: CPT

## 2021-05-24 PROCEDURE — 99223 1ST HOSP IP/OBS HIGH 75: CPT | Performed by: INTERNAL MEDICINE

## 2021-05-24 PROCEDURE — 84145 PROCALCITONIN (PCT): CPT | Performed by: INTERNAL MEDICINE

## 2021-05-24 RX ORDER — ASCORBIC ACID 500 MG
1000 TABLET ORAL DAILY
Status: DISCONTINUED | OUTPATIENT
Start: 2021-05-24 | End: 2021-05-30 | Stop reason: HOSPADM

## 2021-05-24 RX ORDER — FLUOXETINE HYDROCHLORIDE 40 MG/1
40 CAPSULE ORAL NIGHTLY
COMMUNITY

## 2021-05-24 RX ORDER — PANTOPRAZOLE SODIUM 40 MG/1
40 TABLET, DELAYED RELEASE ORAL
Status: DISCONTINUED | OUTPATIENT
Start: 2021-05-24 | End: 2021-05-30 | Stop reason: HOSPADM

## 2021-05-24 RX ORDER — IBUPROFEN 400 MG/1
400 TABLET ORAL ONCE
Status: COMPLETED | OUTPATIENT
Start: 2021-05-24 | End: 2021-05-24

## 2021-05-24 RX ORDER — SODIUM CHLORIDE 0.9 % (FLUSH) 0.9 %
10 SYRINGE (ML) INJECTION EVERY 12 HOURS SCHEDULED
Status: DISCONTINUED | OUTPATIENT
Start: 2021-05-24 | End: 2021-05-30 | Stop reason: HOSPADM

## 2021-05-24 RX ORDER — FUROSEMIDE 10 MG/ML
20 INJECTION INTRAMUSCULAR; INTRAVENOUS ONCE
Status: COMPLETED | OUTPATIENT
Start: 2021-05-24 | End: 2021-05-24

## 2021-05-24 RX ORDER — ZINC SULFATE 50(220)MG
220 CAPSULE ORAL DAILY
Status: DISCONTINUED | OUTPATIENT
Start: 2021-05-24 | End: 2021-05-30 | Stop reason: HOSPADM

## 2021-05-24 RX ORDER — ACETAMINOPHEN 325 MG/1
650 TABLET ORAL EVERY 6 HOURS PRN
Status: DISCONTINUED | OUTPATIENT
Start: 2021-05-24 | End: 2021-05-30 | Stop reason: HOSPADM

## 2021-05-24 RX ORDER — SODIUM CHLORIDE 0.9 % (FLUSH) 0.9 %
10 SYRINGE (ML) INJECTION AS NEEDED
Status: DISCONTINUED | OUTPATIENT
Start: 2021-05-24 | End: 2021-05-30 | Stop reason: HOSPADM

## 2021-05-24 RX ORDER — L.ACID,PARA/B.BIFIDUM/S.THERM 8B CELL
1 CAPSULE ORAL DAILY
Status: DISCONTINUED | OUTPATIENT
Start: 2021-05-24 | End: 2021-05-30 | Stop reason: HOSPADM

## 2021-05-24 RX ORDER — ACETAMINOPHEN 325 MG/1
650 TABLET ORAL EVERY 6 HOURS PRN
Status: DISCONTINUED | OUTPATIENT
Start: 2021-05-24 | End: 2021-05-24

## 2021-05-24 RX ORDER — PANTOPRAZOLE SODIUM 40 MG/1
40 TABLET, DELAYED RELEASE ORAL DAILY
COMMUNITY

## 2021-05-24 RX ORDER — NITROGLYCERIN 0.4 MG/1
0.4 TABLET SUBLINGUAL
Status: DISCONTINUED | OUTPATIENT
Start: 2021-05-24 | End: 2021-05-30 | Stop reason: HOSPADM

## 2021-05-24 RX ORDER — DEXAMETHASONE SODIUM PHOSPHATE 4 MG/ML
8 INJECTION, SOLUTION INTRA-ARTICULAR; INTRALESIONAL; INTRAMUSCULAR; INTRAVENOUS; SOFT TISSUE ONCE
Status: COMPLETED | OUTPATIENT
Start: 2021-05-24 | End: 2021-05-24

## 2021-05-24 RX ORDER — ONDANSETRON 4 MG/1
4 TABLET, ORALLY DISINTEGRATING ORAL EVERY 6 HOURS PRN
Status: DISCONTINUED | OUTPATIENT
Start: 2021-05-24 | End: 2021-05-30 | Stop reason: HOSPADM

## 2021-05-24 RX ORDER — ONDANSETRON 2 MG/ML
4 INJECTION INTRAMUSCULAR; INTRAVENOUS ONCE
Status: COMPLETED | OUTPATIENT
Start: 2021-05-24 | End: 2021-05-24

## 2021-05-24 RX ORDER — DEXAMETHASONE SODIUM PHOSPHATE 4 MG/ML
6 INJECTION, SOLUTION INTRA-ARTICULAR; INTRALESIONAL; INTRAMUSCULAR; INTRAVENOUS; SOFT TISSUE DAILY
Status: DISCONTINUED | OUTPATIENT
Start: 2021-05-25 | End: 2021-05-30 | Stop reason: HOSPADM

## 2021-05-24 RX ORDER — FLUOXETINE HYDROCHLORIDE 20 MG/1
40 CAPSULE ORAL NIGHTLY
Status: DISCONTINUED | OUTPATIENT
Start: 2021-05-24 | End: 2021-05-30 | Stop reason: HOSPADM

## 2021-05-24 RX ORDER — ALBUTEROL SULFATE 90 UG/1
2 AEROSOL, METERED RESPIRATORY (INHALATION) EVERY 6 HOURS PRN
Status: DISCONTINUED | OUTPATIENT
Start: 2021-05-24 | End: 2021-05-30 | Stop reason: HOSPADM

## 2021-05-24 RX ORDER — DEXAMETHASONE SODIUM PHOSPHATE 4 MG/ML
6 INJECTION, SOLUTION INTRA-ARTICULAR; INTRALESIONAL; INTRAMUSCULAR; INTRAVENOUS; SOFT TISSUE DAILY
Status: DISCONTINUED | OUTPATIENT
Start: 2021-05-24 | End: 2021-05-24

## 2021-05-24 RX ADMIN — AZITHROMYCIN MONOHYDRATE 500 MG: 500 INJECTION, POWDER, LYOPHILIZED, FOR SOLUTION INTRAVENOUS at 12:28

## 2021-05-24 RX ADMIN — ACETAMINOPHEN 650 MG: 325 TABLET ORAL at 14:07

## 2021-05-24 RX ADMIN — REMDESIVIR 200 MG: 100 INJECTION, POWDER, LYOPHILIZED, FOR SOLUTION INTRAVENOUS at 06:25

## 2021-05-24 RX ADMIN — Medication 220 MG: at 12:29

## 2021-05-24 RX ADMIN — OXYCODONE HYDROCHLORIDE AND ACETAMINOPHEN 1000 MG: 500 TABLET ORAL at 12:28

## 2021-05-24 RX ADMIN — IBUPROFEN 400 MG: 400 TABLET ORAL at 01:17

## 2021-05-24 RX ADMIN — ACETAMINOPHEN 650 MG: 325 TABLET ORAL at 23:59

## 2021-05-24 RX ADMIN — IOPAMIDOL 70 ML: 755 INJECTION, SOLUTION INTRAVENOUS at 01:53

## 2021-05-24 RX ADMIN — Medication 1 CAPSULE: at 12:29

## 2021-05-24 RX ADMIN — SODIUM CHLORIDE, PRESERVATIVE FREE 10 ML: 5 INJECTION INTRAVENOUS at 08:00

## 2021-05-24 RX ADMIN — ONDANSETRON 4 MG: 2 INJECTION INTRAMUSCULAR; INTRAVENOUS at 01:24

## 2021-05-24 RX ADMIN — FLUOXETINE HYDROCHLORIDE 40 MG: 20 CAPSULE ORAL at 22:07

## 2021-05-24 RX ADMIN — PANTOPRAZOLE SODIUM 40 MG: 40 TABLET, DELAYED RELEASE ORAL at 08:01

## 2021-05-24 RX ADMIN — FUROSEMIDE 20 MG: 10 INJECTION, SOLUTION INTRAMUSCULAR; INTRAVENOUS at 16:16

## 2021-05-24 RX ADMIN — DEXAMETHASONE SODIUM PHOSPHATE 8 MG: 4 INJECTION INTRA-ARTICULAR; INTRALESIONAL; INTRAMUSCULAR; INTRAVENOUS; SOFT TISSUE at 01:16

## 2021-05-24 RX ADMIN — ENOXAPARIN SODIUM 70 MG: 80 INJECTION SUBCUTANEOUS at 08:00

## 2021-05-24 RX ADMIN — SODIUM CHLORIDE, PRESERVATIVE FREE 10 ML: 5 INJECTION INTRAVENOUS at 22:08

## 2021-05-25 ENCOUNTER — APPOINTMENT (OUTPATIENT)
Dept: GENERAL RADIOLOGY | Facility: HOSPITAL | Age: 36
End: 2021-05-25

## 2021-05-25 LAB
A-A DO2: 487.4 MMHG (ref 0–300)
A-A DO2: 552.3 MMHG (ref 0–300)
ALBUMIN SERPL-MCNC: 3.16 G/DL (ref 3.5–5.2)
ALBUMIN/GLOB SERPL: 0.8 G/DL
ALP SERPL-CCNC: 66 U/L (ref 39–117)
ALT SERPL W P-5'-P-CCNC: 37 U/L (ref 1–41)
ANION GAP SERPL CALCULATED.3IONS-SCNC: 11.6 MMOL/L (ref 5–15)
ARTERIAL PATENCY WRIST A: ABNORMAL
ARTERIAL PATENCY WRIST A: POSITIVE
AST SERPL-CCNC: 25 U/L (ref 1–40)
ATMOSPHERIC PRESS: 730 MMHG
ATMOSPHERIC PRESS: 730 MMHG
BASE EXCESS BLDA CALC-SCNC: 2.5 MMOL/L (ref 0–2)
BASE EXCESS BLDA CALC-SCNC: 3.7 MMOL/L (ref 0–2)
BASOPHILS # BLD AUTO: 0.03 10*3/MM3 (ref 0–0.2)
BASOPHILS NFR BLD AUTO: 0.2 % (ref 0–1.5)
BDY SITE: ABNORMAL
BDY SITE: ABNORMAL
BILIRUB SERPL-MCNC: 0.7 MG/DL (ref 0–1.2)
BODY TEMPERATURE: 0 C
BODY TEMPERATURE: 0 C
BUN SERPL-MCNC: 16 MG/DL (ref 6–20)
BUN/CREAT SERPL: 16.5 (ref 7–25)
CALCIUM SPEC-SCNC: 8.5 MG/DL (ref 8.6–10.5)
CHLORIDE SERPL-SCNC: 97 MMOL/L (ref 98–107)
CK SERPL-CCNC: 52 U/L (ref 20–200)
CO2 BLDA-SCNC: 28.2 MMOL/L (ref 22–33)
CO2 BLDA-SCNC: 30 MMOL/L (ref 22–33)
CO2 SERPL-SCNC: 26.4 MMOL/L (ref 22–29)
COHGB MFR BLD: 1.1 % (ref 0–5)
COHGB MFR BLD: <1 % (ref 0–5)
CPAP: 10 CMH2O
CREAT SERPL-MCNC: 0.97 MG/DL (ref 0.76–1.27)
CRP SERPL-MCNC: 19.22 MG/DL (ref 0–0.5)
D DIMER PPP FEU-MCNC: 0.47 MCGFEU/ML (ref 0–0.5)
DEPRECATED RDW RBC AUTO: 40.1 FL (ref 37–54)
EOSINOPHIL # BLD AUTO: 0.01 10*3/MM3 (ref 0–0.4)
EOSINOPHIL NFR BLD AUTO: 0.1 % (ref 0.3–6.2)
ERYTHROCYTE [DISTWIDTH] IN BLOOD BY AUTOMATED COUNT: 12.1 % (ref 12.3–15.4)
FERRITIN SERPL-MCNC: 1423 NG/ML (ref 30–400)
FIBRINOGEN PPP-MCNC: 712 MG/DL (ref 173–524)
GAS FLOW AIRWAY: 60 LPM
GFR SERPL CREATININE-BSD FRML MDRD: 88 ML/MIN/1.73
GLOBULIN UR ELPH-MCNC: 4.1 GM/DL
GLUCOSE SERPL-MCNC: 102 MG/DL (ref 65–99)
HCO3 BLDA-SCNC: 27 MMOL/L (ref 20–26)
HCO3 BLDA-SCNC: 28.7 MMOL/L (ref 20–26)
HCT VFR BLD AUTO: 46.3 % (ref 37.5–51)
HCT VFR BLD CALC: 49.3 % (ref 38–51)
HCT VFR BLD CALC: 51.2 % (ref 38–51)
HGB BLD-MCNC: 15.4 G/DL (ref 13–17.7)
HGB BLDA-MCNC: 16.1 G/DL (ref 14–18)
HGB BLDA-MCNC: 16.7 G/DL (ref 14–18)
IMM GRANULOCYTES # BLD AUTO: 0.12 10*3/MM3 (ref 0–0.05)
IMM GRANULOCYTES NFR BLD AUTO: 0.9 % (ref 0–0.5)
INHALED O2 CONCENTRATION: 90 %
INHALED O2 CONCENTRATION: 94 %
LDH SERPL-CCNC: 306 U/L (ref 135–225)
LYMPHOCYTES # BLD AUTO: 1.02 10*3/MM3 (ref 0.7–3.1)
LYMPHOCYTES NFR BLD AUTO: 7.3 % (ref 19.6–45.3)
Lab: ABNORMAL
MCH RBC QN AUTO: 30.1 PG (ref 26.6–33)
MCHC RBC AUTO-ENTMCNC: 33.3 G/DL (ref 31.5–35.7)
MCV RBC AUTO: 90.4 FL (ref 79–97)
METHGB BLD QL: <1 % (ref 0–3)
METHGB BLD QL: <1 % (ref 0–3)
MODALITY: ABNORMAL
MODALITY: ABNORMAL
MONOCYTES # BLD AUTO: 1.42 10*3/MM3 (ref 0.1–0.9)
MONOCYTES NFR BLD AUTO: 10.2 % (ref 5–12)
NEUTROPHILS NFR BLD AUTO: 11.33 10*3/MM3 (ref 1.7–7)
NEUTROPHILS NFR BLD AUTO: 81.3 % (ref 42.7–76)
NOTE: ABNORMAL
NOTE: ABNORMAL
NOTIFIED BY: ABNORMAL
NOTIFIED WHO: ABNORMAL
NRBC BLD AUTO-RTO: 0 /100 WBC (ref 0–0.2)
OXYHGB MFR BLDV: 83.8 % (ref 94–99)
OXYHGB MFR BLDV: 95.5 % (ref 94–99)
PCO2 BLDA: 40.3 MM HG (ref 35–45)
PCO2 BLDA: 43.2 MM HG (ref 35–45)
PCO2 TEMP ADJ BLD: ABNORMAL MM[HG]
PCO2 TEMP ADJ BLD: ABNORMAL MM[HG]
PH BLDA: 7.43 PH UNITS (ref 7.35–7.45)
PH BLDA: 7.43 PH UNITS (ref 7.35–7.45)
PH, TEMP CORRECTED: ABNORMAL
PH, TEMP CORRECTED: ABNORMAL
PLATELET # BLD AUTO: 230 10*3/MM3 (ref 140–450)
PMV BLD AUTO: 10.5 FL (ref 6–12)
PO2 BLDA: 48.7 MM HG (ref 83–108)
PO2 BLDA: 83.2 MM HG (ref 83–108)
PO2 TEMP ADJ BLD: ABNORMAL MM[HG]
PO2 TEMP ADJ BLD: ABNORMAL MM[HG]
POTASSIUM SERPL-SCNC: 4.2 MMOL/L (ref 3.5–5.2)
PROT SERPL-MCNC: 7.3 G/DL (ref 6–8.5)
QT INTERVAL: 376 MS
QTC INTERVAL: 423 MS
RBC # BLD AUTO: 5.12 10*6/MM3 (ref 4.14–5.8)
SAO2 % BLDCOA: 84.8 % (ref 94–99)
SAO2 % BLDCOA: 96.5 % (ref 94–99)
SODIUM SERPL-SCNC: 135 MMOL/L (ref 136–145)
VENTILATOR MODE: ABNORMAL
VENTILATOR MODE: ABNORMAL
WBC # BLD AUTO: 13.93 10*3/MM3 (ref 3.4–10.8)

## 2021-05-25 PROCEDURE — 71045 X-RAY EXAM CHEST 1 VIEW: CPT | Performed by: RADIOLOGY

## 2021-05-25 PROCEDURE — 86140 C-REACTIVE PROTEIN: CPT | Performed by: INTERNAL MEDICINE

## 2021-05-25 PROCEDURE — 25010000002 FUROSEMIDE PER 20 MG: Performed by: HOSPITALIST

## 2021-05-25 PROCEDURE — 94799 UNLISTED PULMONARY SVC/PX: CPT

## 2021-05-25 PROCEDURE — 85384 FIBRINOGEN ACTIVITY: CPT | Performed by: INTERNAL MEDICINE

## 2021-05-25 PROCEDURE — 25010000002 DEXAMETHASONE PER 1 MG: Performed by: HOSPITALIST

## 2021-05-25 PROCEDURE — 94660 CPAP INITIATION&MGMT: CPT

## 2021-05-25 PROCEDURE — 82550 ASSAY OF CK (CPK): CPT | Performed by: INTERNAL MEDICINE

## 2021-05-25 PROCEDURE — 83050 HGB METHEMOGLOBIN QUAN: CPT

## 2021-05-25 PROCEDURE — 83615 LACTATE (LD) (LDH) ENZYME: CPT | Performed by: INTERNAL MEDICINE

## 2021-05-25 PROCEDURE — 82375 ASSAY CARBOXYHB QUANT: CPT

## 2021-05-25 PROCEDURE — 99233 SBSQ HOSP IP/OBS HIGH 50: CPT | Performed by: HOSPITALIST

## 2021-05-25 PROCEDURE — 25010000002 ENOXAPARIN PER 10 MG: Performed by: INTERNAL MEDICINE

## 2021-05-25 PROCEDURE — 85025 COMPLETE CBC W/AUTO DIFF WBC: CPT | Performed by: INTERNAL MEDICINE

## 2021-05-25 PROCEDURE — 71045 X-RAY EXAM CHEST 1 VIEW: CPT

## 2021-05-25 PROCEDURE — 93005 ELECTROCARDIOGRAM TRACING: CPT | Performed by: HOSPITALIST

## 2021-05-25 PROCEDURE — 36600 WITHDRAWAL OF ARTERIAL BLOOD: CPT

## 2021-05-25 PROCEDURE — 85379 FIBRIN DEGRADATION QUANT: CPT | Performed by: INTERNAL MEDICINE

## 2021-05-25 PROCEDURE — 99254 IP/OBS CNSLTJ NEW/EST MOD 60: CPT | Performed by: INTERNAL MEDICINE

## 2021-05-25 PROCEDURE — 25010000002 AZITHROMYCIN PER 500 MG: Performed by: HOSPITALIST

## 2021-05-25 PROCEDURE — 63710000001 DIPHENHYDRAMINE PER 50 MG: Performed by: HOSPITALIST

## 2021-05-25 PROCEDURE — 25010000002 CEFTRIAXONE PER 250 MG: Performed by: HOSPITALIST

## 2021-05-25 PROCEDURE — 82805 BLOOD GASES W/O2 SATURATION: CPT

## 2021-05-25 PROCEDURE — 80053 COMPREHEN METABOLIC PANEL: CPT | Performed by: INTERNAL MEDICINE

## 2021-05-25 PROCEDURE — 82728 ASSAY OF FERRITIN: CPT | Performed by: INTERNAL MEDICINE

## 2021-05-25 RX ORDER — DIPHENHYDRAMINE HCL 50 MG
50 CAPSULE ORAL ONCE
Status: COMPLETED | OUTPATIENT
Start: 2021-05-25 | End: 2021-05-25

## 2021-05-25 RX ORDER — FUROSEMIDE 10 MG/ML
40 INJECTION INTRAMUSCULAR; INTRAVENOUS ONCE
Status: COMPLETED | OUTPATIENT
Start: 2021-05-25 | End: 2021-05-25

## 2021-05-25 RX ADMIN — ENOXAPARIN SODIUM 70 MG: 80 INJECTION SUBCUTANEOUS at 10:13

## 2021-05-25 RX ADMIN — DIPHENHYDRAMINE HCL 50 MG: 50 CAPSULE ORAL at 14:15

## 2021-05-25 RX ADMIN — DEXAMETHASONE SODIUM PHOSPHATE 6 MG: 4 INJECTION, SOLUTION INTRA-ARTICULAR; INTRALESIONAL; INTRAMUSCULAR; INTRAVENOUS; SOFT TISSUE at 10:12

## 2021-05-25 RX ADMIN — Medication 1 CAPSULE: at 10:13

## 2021-05-25 RX ADMIN — OXYCODONE HYDROCHLORIDE AND ACETAMINOPHEN 1000 MG: 500 TABLET ORAL at 10:13

## 2021-05-25 RX ADMIN — CEFTRIAXONE SODIUM 2 G: 2 INJECTION, POWDER, FOR SOLUTION INTRAMUSCULAR; INTRAVENOUS at 20:53

## 2021-05-25 RX ADMIN — SODIUM CHLORIDE, PRESERVATIVE FREE 10 ML: 5 INJECTION INTRAVENOUS at 20:51

## 2021-05-25 RX ADMIN — Medication 220 MG: at 12:03

## 2021-05-25 RX ADMIN — FUROSEMIDE 40 MG: 10 INJECTION, SOLUTION INTRAMUSCULAR; INTRAVENOUS at 14:15

## 2021-05-25 RX ADMIN — SODIUM CHLORIDE, PRESERVATIVE FREE 10 ML: 5 INJECTION INTRAVENOUS at 10:14

## 2021-05-25 RX ADMIN — AZITHROMYCIN MONOHYDRATE 500 MG: 500 INJECTION, POWDER, LYOPHILIZED, FOR SOLUTION INTRAVENOUS at 12:04

## 2021-05-25 RX ADMIN — FLUOXETINE HYDROCHLORIDE 40 MG: 20 CAPSULE ORAL at 20:53

## 2021-05-25 RX ADMIN — REMDESIVIR 100 MG: 100 INJECTION, POWDER, LYOPHILIZED, FOR SOLUTION INTRAVENOUS at 06:02

## 2021-05-25 RX ADMIN — PANTOPRAZOLE SODIUM 40 MG: 40 TABLET, DELAYED RELEASE ORAL at 06:02

## 2021-05-26 ENCOUNTER — APPOINTMENT (OUTPATIENT)
Dept: GENERAL RADIOLOGY | Facility: HOSPITAL | Age: 36
End: 2021-05-26

## 2021-05-26 LAB
A-A DO2: 454.8 MMHG (ref 0–300)
ALBUMIN SERPL-MCNC: 3.27 G/DL (ref 3.5–5.2)
ALBUMIN/GLOB SERPL: 0.7 G/DL
ALP SERPL-CCNC: 70 U/L (ref 39–117)
ALT SERPL W P-5'-P-CCNC: 39 U/L (ref 1–41)
ANION GAP SERPL CALCULATED.3IONS-SCNC: 11.3 MMOL/L (ref 5–15)
ARTERIAL PATENCY WRIST A: POSITIVE
AST SERPL-CCNC: 28 U/L (ref 1–40)
ATMOSPHERIC PRESS: 730 MMHG
BASE EXCESS BLDA CALC-SCNC: 3.8 MMOL/L (ref 0–2)
BASOPHILS # BLD AUTO: 0.03 10*3/MM3 (ref 0–0.2)
BASOPHILS NFR BLD AUTO: 0.3 % (ref 0–1.5)
BDY SITE: ABNORMAL
BILIRUB SERPL-MCNC: 0.5 MG/DL (ref 0–1.2)
BODY TEMPERATURE: 0 C
BUN SERPL-MCNC: 21 MG/DL (ref 6–20)
BUN/CREAT SERPL: 22.8 (ref 7–25)
CALCIUM SPEC-SCNC: 8.6 MG/DL (ref 8.6–10.5)
CHLORIDE SERPL-SCNC: 102 MMOL/L (ref 98–107)
CK SERPL-CCNC: 49 U/L (ref 20–200)
CO2 BLDA-SCNC: 31.3 MMOL/L (ref 22–33)
CO2 SERPL-SCNC: 26.7 MMOL/L (ref 22–29)
COHGB MFR BLD: <1 % (ref 0–5)
CPAP: 10 CMH2O
CREAT SERPL-MCNC: 0.92 MG/DL (ref 0.76–1.27)
CRP SERPL-MCNC: 14.84 MG/DL (ref 0–0.5)
DEPRECATED RDW RBC AUTO: 39.8 FL (ref 37–54)
EOSINOPHIL # BLD AUTO: 0 10*3/MM3 (ref 0–0.4)
EOSINOPHIL NFR BLD AUTO: 0 % (ref 0.3–6.2)
ERYTHROCYTE [DISTWIDTH] IN BLOOD BY AUTOMATED COUNT: 12.2 % (ref 12.3–15.4)
FERRITIN SERPL-MCNC: 1716 NG/ML (ref 30–400)
GFR SERPL CREATININE-BSD FRML MDRD: 93 ML/MIN/1.73
GLOBULIN UR ELPH-MCNC: 4.4 GM/DL
GLUCOSE SERPL-MCNC: 126 MG/DL (ref 65–99)
HCO3 BLDA-SCNC: 29.8 MMOL/L (ref 20–26)
HCT VFR BLD AUTO: 47.4 % (ref 37.5–51)
HCT VFR BLD CALC: 48.2 % (ref 38–51)
HGB BLD-MCNC: 16 G/DL (ref 13–17.7)
HGB BLDA-MCNC: 15.7 G/DL (ref 14–18)
IMM GRANULOCYTES # BLD AUTO: 0.22 10*3/MM3 (ref 0–0.05)
IMM GRANULOCYTES NFR BLD AUTO: 2.1 % (ref 0–0.5)
INHALED O2 CONCENTRATION: 90 %
LYMPHOCYTES # BLD AUTO: 1.02 10*3/MM3 (ref 0.7–3.1)
LYMPHOCYTES NFR BLD AUTO: 9.5 % (ref 19.6–45.3)
Lab: ABNORMAL
MAGNESIUM SERPL-MCNC: 2.8 MG/DL (ref 1.6–2.6)
MCH RBC QN AUTO: 30 PG (ref 26.6–33)
MCHC RBC AUTO-ENTMCNC: 33.8 G/DL (ref 31.5–35.7)
MCV RBC AUTO: 88.9 FL (ref 79–97)
METHGB BLD QL: <1 % (ref 0–3)
MODALITY: ABNORMAL
MONOCYTES # BLD AUTO: 1.09 10*3/MM3 (ref 0.1–0.9)
MONOCYTES NFR BLD AUTO: 10.2 % (ref 5–12)
NEUTROPHILS NFR BLD AUTO: 77.9 % (ref 42.7–76)
NEUTROPHILS NFR BLD AUTO: 8.36 10*3/MM3 (ref 1.7–7)
NOTE: ABNORMAL
NRBC BLD AUTO-RTO: 0 /100 WBC (ref 0–0.2)
OXYHGB MFR BLDV: 97.5 % (ref 94–99)
PCO2 BLDA: 48.5 MM HG (ref 35–45)
PCO2 TEMP ADJ BLD: ABNORMAL MM[HG]
PH BLDA: 7.4 PH UNITS (ref 7.35–7.45)
PH, TEMP CORRECTED: ABNORMAL
PLATELET # BLD AUTO: 280 10*3/MM3 (ref 140–450)
PMV BLD AUTO: 9.9 FL (ref 6–12)
PO2 BLDA: 110 MM HG (ref 83–108)
PO2 TEMP ADJ BLD: ABNORMAL MM[HG]
POTASSIUM SERPL-SCNC: 4.3 MMOL/L (ref 3.5–5.2)
PROT SERPL-MCNC: 7.7 G/DL (ref 6–8.5)
RBC # BLD AUTO: 5.33 10*6/MM3 (ref 4.14–5.8)
SAO2 % BLDCOA: 98.4 % (ref 94–99)
SODIUM SERPL-SCNC: 140 MMOL/L (ref 136–145)
VENTILATOR MODE: ABNORMAL
WBC # BLD AUTO: 10.72 10*3/MM3 (ref 3.4–10.8)

## 2021-05-26 PROCEDURE — 83520 IMMUNOASSAY QUANT NOS NONAB: CPT | Performed by: HOSPITALIST

## 2021-05-26 PROCEDURE — 71045 X-RAY EXAM CHEST 1 VIEW: CPT | Performed by: RADIOLOGY

## 2021-05-26 PROCEDURE — 99232 SBSQ HOSP IP/OBS MODERATE 35: CPT | Performed by: INTERNAL MEDICINE

## 2021-05-26 PROCEDURE — 25010000002 ENOXAPARIN PER 10 MG: Performed by: HOSPITALIST

## 2021-05-26 PROCEDURE — 36600 WITHDRAWAL OF ARTERIAL BLOOD: CPT

## 2021-05-26 PROCEDURE — 82375 ASSAY CARBOXYHB QUANT: CPT

## 2021-05-26 PROCEDURE — 82805 BLOOD GASES W/O2 SATURATION: CPT

## 2021-05-26 PROCEDURE — 86140 C-REACTIVE PROTEIN: CPT | Performed by: HOSPITALIST

## 2021-05-26 PROCEDURE — 94660 CPAP INITIATION&MGMT: CPT

## 2021-05-26 PROCEDURE — 94799 UNLISTED PULMONARY SVC/PX: CPT

## 2021-05-26 PROCEDURE — 25010000002 AZITHROMYCIN PER 500 MG: Performed by: HOSPITALIST

## 2021-05-26 PROCEDURE — 71045 X-RAY EXAM CHEST 1 VIEW: CPT

## 2021-05-26 PROCEDURE — 99233 SBSQ HOSP IP/OBS HIGH 50: CPT | Performed by: HOSPITALIST

## 2021-05-26 PROCEDURE — 80053 COMPREHEN METABOLIC PANEL: CPT | Performed by: HOSPITALIST

## 2021-05-26 PROCEDURE — 25010000002 DEXAMETHASONE PER 1 MG: Performed by: HOSPITALIST

## 2021-05-26 PROCEDURE — 25010000002 CEFTRIAXONE PER 250 MG: Performed by: HOSPITALIST

## 2021-05-26 PROCEDURE — 83735 ASSAY OF MAGNESIUM: CPT | Performed by: HOSPITALIST

## 2021-05-26 PROCEDURE — 82550 ASSAY OF CK (CPK): CPT | Performed by: HOSPITALIST

## 2021-05-26 PROCEDURE — 85025 COMPLETE CBC W/AUTO DIFF WBC: CPT | Performed by: HOSPITALIST

## 2021-05-26 PROCEDURE — 82728 ASSAY OF FERRITIN: CPT | Performed by: HOSPITALIST

## 2021-05-26 PROCEDURE — 83050 HGB METHEMOGLOBIN QUAN: CPT

## 2021-05-26 RX ORDER — SODIUM CHLORIDE 0.9 % (FLUSH) 0.9 %
10 SYRINGE (ML) INJECTION AS NEEDED
Status: DISCONTINUED | OUTPATIENT
Start: 2021-05-26 | End: 2021-05-30 | Stop reason: HOSPADM

## 2021-05-26 RX ORDER — SODIUM CHLORIDE 0.9 % (FLUSH) 0.9 %
10 SYRINGE (ML) INJECTION EVERY 12 HOURS SCHEDULED
Status: DISCONTINUED | OUTPATIENT
Start: 2021-05-26 | End: 2021-05-30 | Stop reason: HOSPADM

## 2021-05-26 RX ADMIN — SODIUM CHLORIDE, PRESERVATIVE FREE 10 ML: 5 INJECTION INTRAVENOUS at 11:00

## 2021-05-26 RX ADMIN — ENOXAPARIN SODIUM 70 MG: 80 INJECTION SUBCUTANEOUS at 08:40

## 2021-05-26 RX ADMIN — DEXAMETHASONE SODIUM PHOSPHATE 6 MG: 4 INJECTION, SOLUTION INTRA-ARTICULAR; INTRALESIONAL; INTRAMUSCULAR; INTRAVENOUS; SOFT TISSUE at 08:44

## 2021-05-26 RX ADMIN — Medication 220 MG: at 08:39

## 2021-05-26 RX ADMIN — SODIUM CHLORIDE, PRESERVATIVE FREE 10 ML: 5 INJECTION INTRAVENOUS at 08:40

## 2021-05-26 RX ADMIN — PANTOPRAZOLE SODIUM 40 MG: 40 TABLET, DELAYED RELEASE ORAL at 06:30

## 2021-05-26 RX ADMIN — REMDESIVIR 100 MG: 100 INJECTION, POWDER, LYOPHILIZED, FOR SOLUTION INTRAVENOUS at 06:29

## 2021-05-26 RX ADMIN — Medication 1 CAPSULE: at 08:39

## 2021-05-26 RX ADMIN — FLUOXETINE HYDROCHLORIDE 40 MG: 20 CAPSULE ORAL at 21:25

## 2021-05-26 RX ADMIN — SODIUM CHLORIDE, PRESERVATIVE FREE 10 ML: 5 INJECTION INTRAVENOUS at 21:26

## 2021-05-26 RX ADMIN — OXYCODONE HYDROCHLORIDE AND ACETAMINOPHEN 1000 MG: 500 TABLET ORAL at 08:39

## 2021-05-26 RX ADMIN — CEFTRIAXONE SODIUM 2 G: 2 INJECTION, POWDER, FOR SOLUTION INTRAMUSCULAR; INTRAVENOUS at 21:25

## 2021-05-26 RX ADMIN — AZITHROMYCIN MONOHYDRATE 500 MG: 500 INJECTION, POWDER, LYOPHILIZED, FOR SOLUTION INTRAVENOUS at 11:32

## 2021-05-27 ENCOUNTER — APPOINTMENT (OUTPATIENT)
Dept: GENERAL RADIOLOGY | Facility: HOSPITAL | Age: 36
End: 2021-05-27

## 2021-05-27 LAB
027 TOXIN: NORMAL
A-A DO2: 450.2 MMHG (ref 0–300)
ALBUMIN SERPL-MCNC: 3.2 G/DL (ref 3.5–5.2)
ALBUMIN/GLOB SERPL: 0.8 G/DL
ALP SERPL-CCNC: 66 U/L (ref 39–117)
ALT SERPL W P-5'-P-CCNC: 37 U/L (ref 1–41)
ANION GAP SERPL CALCULATED.3IONS-SCNC: 9.9 MMOL/L (ref 5–15)
ARTERIAL PATENCY WRIST A: POSITIVE
AST SERPL-CCNC: 23 U/L (ref 1–40)
ATMOSPHERIC PRESS: 729 MMHG
BASE EXCESS BLDA CALC-SCNC: 2.7 MMOL/L (ref 0–2)
BASOPHILS # BLD AUTO: 0.03 10*3/MM3 (ref 0–0.2)
BASOPHILS NFR BLD AUTO: 0.2 % (ref 0–1.5)
BDY SITE: ABNORMAL
BILIRUB SERPL-MCNC: 0.3 MG/DL (ref 0–1.2)
BODY TEMPERATURE: 0 C
BUN SERPL-MCNC: 24 MG/DL (ref 6–20)
BUN/CREAT SERPL: 29.3 (ref 7–25)
C DIFF TOX GENS STL QL NAA+PROBE: NEGATIVE
CALCIUM SPEC-SCNC: 8.4 MG/DL (ref 8.6–10.5)
CHLORIDE SERPL-SCNC: 104 MMOL/L (ref 98–107)
CK SERPL-CCNC: 48 U/L (ref 20–200)
CO2 BLDA-SCNC: 29.2 MMOL/L (ref 22–33)
CO2 SERPL-SCNC: 26.1 MMOL/L (ref 22–29)
COHGB MFR BLD: <1 % (ref 0–5)
CREAT SERPL-MCNC: 0.82 MG/DL (ref 0.76–1.27)
CRP SERPL-MCNC: 6.1 MG/DL (ref 0–0.5)
D DIMER PPP FEU-MCNC: 0.39 MCGFEU/ML (ref 0–0.5)
DEPRECATED RDW RBC AUTO: 37.7 FL (ref 37–54)
EOSINOPHIL # BLD AUTO: 0 10*3/MM3 (ref 0–0.4)
EOSINOPHIL NFR BLD AUTO: 0 % (ref 0.3–6.2)
ERYTHROCYTE [DISTWIDTH] IN BLOOD BY AUTOMATED COUNT: 11.7 % (ref 12.3–15.4)
FERRITIN SERPL-MCNC: 1401 NG/ML (ref 30–400)
FIBRINOGEN PPP-MCNC: 684 MG/DL (ref 173–524)
GAS FLOW AIRWAY: 50 LPM
GFR SERPL CREATININE-BSD FRML MDRD: 106 ML/MIN/1.73
GLOBULIN UR ELPH-MCNC: 4 GM/DL
GLUCOSE SERPL-MCNC: 104 MG/DL (ref 65–99)
HCO3 BLDA-SCNC: 27.8 MMOL/L (ref 20–26)
HCT VFR BLD AUTO: 46.5 % (ref 37.5–51)
HCT VFR BLD CALC: 52.5 % (ref 38–51)
HGB BLD-MCNC: 15.5 G/DL (ref 13–17.7)
HGB BLDA-MCNC: 17.1 G/DL (ref 14–18)
IMM GRANULOCYTES # BLD AUTO: 0.27 10*3/MM3 (ref 0–0.05)
IMM GRANULOCYTES NFR BLD AUTO: 2.2 % (ref 0–0.5)
INHALED O2 CONCENTRATION: 86 %
LDH SERPL-CCNC: 266 U/L (ref 135–225)
LYMPHOCYTES # BLD AUTO: 1.29 10*3/MM3 (ref 0.7–3.1)
LYMPHOCYTES NFR BLD AUTO: 10.3 % (ref 19.6–45.3)
Lab: ABNORMAL
MCH RBC QN AUTO: 29.6 PG (ref 26.6–33)
MCHC RBC AUTO-ENTMCNC: 33.3 G/DL (ref 31.5–35.7)
MCV RBC AUTO: 88.7 FL (ref 79–97)
METHGB BLD QL: <1 % (ref 0–3)
MODALITY: ABNORMAL
MONOCYTES # BLD AUTO: 1.11 10*3/MM3 (ref 0.1–0.9)
MONOCYTES NFR BLD AUTO: 8.9 % (ref 5–12)
NEUTROPHILS NFR BLD AUTO: 78.4 % (ref 42.7–76)
NEUTROPHILS NFR BLD AUTO: 9.77 10*3/MM3 (ref 1.7–7)
NOTE: ABNORMAL
NRBC BLD AUTO-RTO: 0 /100 WBC (ref 0–0.2)
OXYHGB MFR BLDV: 96.8 % (ref 94–99)
PCO2 BLDA: 43.4 MM HG (ref 35–45)
PCO2 TEMP ADJ BLD: ABNORMAL MM[HG]
PH BLDA: 7.42 PH UNITS (ref 7.35–7.45)
PH, TEMP CORRECTED: ABNORMAL
PLATELET # BLD AUTO: 292 10*3/MM3 (ref 140–450)
PMV BLD AUTO: 10.3 FL (ref 6–12)
PO2 BLDA: 91.9 MM HG (ref 83–108)
PO2 TEMP ADJ BLD: ABNORMAL MM[HG]
POTASSIUM SERPL-SCNC: 4.4 MMOL/L (ref 3.5–5.2)
PROT SERPL-MCNC: 7.2 G/DL (ref 6–8.5)
RBC # BLD AUTO: 5.24 10*6/MM3 (ref 4.14–5.8)
SAO2 % BLDCOA: 97.4 % (ref 94–99)
SODIUM SERPL-SCNC: 140 MMOL/L (ref 136–145)
VENTILATOR MODE: ABNORMAL
WBC # BLD AUTO: 12.47 10*3/MM3 (ref 3.4–10.8)

## 2021-05-27 PROCEDURE — 82375 ASSAY CARBOXYHB QUANT: CPT

## 2021-05-27 PROCEDURE — 99232 SBSQ HOSP IP/OBS MODERATE 35: CPT | Performed by: INTERNAL MEDICINE

## 2021-05-27 PROCEDURE — 25010000002 FUROSEMIDE PER 20 MG: Performed by: HOSPITALIST

## 2021-05-27 PROCEDURE — 36600 WITHDRAWAL OF ARTERIAL BLOOD: CPT

## 2021-05-27 PROCEDURE — 83615 LACTATE (LD) (LDH) ENZYME: CPT | Performed by: HOSPITALIST

## 2021-05-27 PROCEDURE — 85025 COMPLETE CBC W/AUTO DIFF WBC: CPT | Performed by: HOSPITALIST

## 2021-05-27 PROCEDURE — 85384 FIBRINOGEN ACTIVITY: CPT | Performed by: HOSPITALIST

## 2021-05-27 PROCEDURE — 71045 X-RAY EXAM CHEST 1 VIEW: CPT

## 2021-05-27 PROCEDURE — 82728 ASSAY OF FERRITIN: CPT | Performed by: HOSPITALIST

## 2021-05-27 PROCEDURE — 80053 COMPREHEN METABOLIC PANEL: CPT | Performed by: HOSPITALIST

## 2021-05-27 PROCEDURE — 94799 UNLISTED PULMONARY SVC/PX: CPT

## 2021-05-27 PROCEDURE — 85379 FIBRIN DEGRADATION QUANT: CPT | Performed by: HOSPITALIST

## 2021-05-27 PROCEDURE — 82550 ASSAY OF CK (CPK): CPT | Performed by: HOSPITALIST

## 2021-05-27 PROCEDURE — 25010000002 DEXAMETHASONE PER 1 MG: Performed by: HOSPITALIST

## 2021-05-27 PROCEDURE — 82805 BLOOD GASES W/O2 SATURATION: CPT

## 2021-05-27 PROCEDURE — 25010000002 CEFTRIAXONE PER 250 MG: Performed by: HOSPITALIST

## 2021-05-27 PROCEDURE — 99233 SBSQ HOSP IP/OBS HIGH 50: CPT | Performed by: HOSPITALIST

## 2021-05-27 PROCEDURE — 71045 X-RAY EXAM CHEST 1 VIEW: CPT | Performed by: RADIOLOGY

## 2021-05-27 PROCEDURE — 25010000002 AZITHROMYCIN PER 500 MG: Performed by: HOSPITALIST

## 2021-05-27 PROCEDURE — 87493 C DIFF AMPLIFIED PROBE: CPT | Performed by: HOSPITALIST

## 2021-05-27 PROCEDURE — 25010000002 ENOXAPARIN PER 10 MG: Performed by: HOSPITALIST

## 2021-05-27 PROCEDURE — 83050 HGB METHEMOGLOBIN QUAN: CPT

## 2021-05-27 PROCEDURE — 86140 C-REACTIVE PROTEIN: CPT | Performed by: HOSPITALIST

## 2021-05-27 RX ORDER — FUROSEMIDE 10 MG/ML
40 INJECTION INTRAMUSCULAR; INTRAVENOUS ONCE
Status: COMPLETED | OUTPATIENT
Start: 2021-05-27 | End: 2021-05-27

## 2021-05-27 RX ADMIN — DEXAMETHASONE SODIUM PHOSPHATE 6 MG: 4 INJECTION, SOLUTION INTRA-ARTICULAR; INTRALESIONAL; INTRAMUSCULAR; INTRAVENOUS; SOFT TISSUE at 08:01

## 2021-05-27 RX ADMIN — Medication 220 MG: at 10:00

## 2021-05-27 RX ADMIN — REMDESIVIR 100 MG: 100 INJECTION, POWDER, LYOPHILIZED, FOR SOLUTION INTRAVENOUS at 06:35

## 2021-05-27 RX ADMIN — FLUOXETINE HYDROCHLORIDE 40 MG: 20 CAPSULE ORAL at 22:27

## 2021-05-27 RX ADMIN — FUROSEMIDE 40 MG: 10 INJECTION, SOLUTION INTRAMUSCULAR; INTRAVENOUS at 13:28

## 2021-05-27 RX ADMIN — SODIUM CHLORIDE, PRESERVATIVE FREE 10 ML: 5 INJECTION INTRAVENOUS at 08:02

## 2021-05-27 RX ADMIN — CEFTRIAXONE SODIUM 2 G: 2 INJECTION, POWDER, FOR SOLUTION INTRAMUSCULAR; INTRAVENOUS at 22:27

## 2021-05-27 RX ADMIN — ENOXAPARIN SODIUM 70 MG: 80 INJECTION SUBCUTANEOUS at 08:02

## 2021-05-27 RX ADMIN — AZITHROMYCIN MONOHYDRATE 500 MG: 500 INJECTION, POWDER, LYOPHILIZED, FOR SOLUTION INTRAVENOUS at 11:54

## 2021-05-27 RX ADMIN — PANTOPRAZOLE SODIUM 40 MG: 40 TABLET, DELAYED RELEASE ORAL at 06:35

## 2021-05-27 RX ADMIN — SODIUM CHLORIDE, PRESERVATIVE FREE 10 ML: 5 INJECTION INTRAVENOUS at 22:28

## 2021-05-27 RX ADMIN — Medication 1 CAPSULE: at 08:02

## 2021-05-27 RX ADMIN — SODIUM CHLORIDE, PRESERVATIVE FREE 10 ML: 5 INJECTION INTRAVENOUS at 08:03

## 2021-05-27 RX ADMIN — OXYCODONE HYDROCHLORIDE AND ACETAMINOPHEN 1000 MG: 500 TABLET ORAL at 08:02

## 2021-05-28 LAB
A-A DO2: ABNORMAL
ALBUMIN SERPL-MCNC: 3.03 G/DL (ref 3.5–5.2)
ALBUMIN/GLOB SERPL: 0.7 G/DL
ALP SERPL-CCNC: 67 U/L (ref 39–117)
ALT SERPL W P-5'-P-CCNC: 46 U/L (ref 1–41)
ANION GAP SERPL CALCULATED.3IONS-SCNC: 11.7 MMOL/L (ref 5–15)
ARTERIAL PATENCY WRIST A: ABNORMAL
AST SERPL-CCNC: 26 U/L (ref 1–40)
ATMOSPHERIC PRESS: 724 MMHG
BASE EXCESS BLDA CALC-SCNC: 4 MMOL/L (ref 0–2)
BASOPHILS # BLD AUTO: 0.05 10*3/MM3 (ref 0–0.2)
BASOPHILS NFR BLD AUTO: 0.3 % (ref 0–1.5)
BDY SITE: ABNORMAL
BILIRUB SERPL-MCNC: 0.4 MG/DL (ref 0–1.2)
BODY TEMPERATURE: 0 C
BUN SERPL-MCNC: 27 MG/DL (ref 6–20)
BUN/CREAT SERPL: 31 (ref 7–25)
CALCIUM SPEC-SCNC: 8.4 MG/DL (ref 8.6–10.5)
CHLORIDE SERPL-SCNC: 103 MMOL/L (ref 98–107)
CK SERPL-CCNC: 44 U/L (ref 20–200)
CO2 BLDA-SCNC: 30.5 MMOL/L (ref 22–33)
CO2 SERPL-SCNC: 24.3 MMOL/L (ref 22–29)
COHGB MFR BLD: <1 % (ref 0–5)
CPAP: 10 CMH2O
CREAT SERPL-MCNC: 0.87 MG/DL (ref 0.76–1.27)
CRP SERPL-MCNC: 2.92 MG/DL (ref 0–0.5)
DEPRECATED RDW RBC AUTO: 37.9 FL (ref 37–54)
EOSINOPHIL # BLD AUTO: 0 10*3/MM3 (ref 0–0.4)
EOSINOPHIL NFR BLD AUTO: 0 % (ref 0.3–6.2)
ERYTHROCYTE [DISTWIDTH] IN BLOOD BY AUTOMATED COUNT: 11.7 % (ref 12.3–15.4)
FERRITIN SERPL-MCNC: 1317 NG/ML (ref 30–400)
GFR SERPL CREATININE-BSD FRML MDRD: 99 ML/MIN/1.73
GLOBULIN UR ELPH-MCNC: 4.3 GM/DL
GLUCOSE SERPL-MCNC: 97 MG/DL (ref 65–99)
HCO3 BLDA-SCNC: 29.1 MMOL/L (ref 20–26)
HCT VFR BLD AUTO: 45.7 % (ref 37.5–51)
HCT VFR BLD CALC: 49.6 % (ref 38–51)
HGB BLD-MCNC: 15.3 G/DL (ref 13–17.7)
HGB BLDA-MCNC: 16.2 G/DL (ref 14–18)
IL6 SERPL-MCNC: 5.7 PG/ML (ref 0–13)
IMM GRANULOCYTES # BLD AUTO: 0.32 10*3/MM3 (ref 0–0.05)
IMM GRANULOCYTES NFR BLD AUTO: 2.1 % (ref 0–0.5)
INHALED O2 CONCENTRATION: 21 %
LYMPHOCYTES # BLD AUTO: 1.85 10*3/MM3 (ref 0.7–3.1)
LYMPHOCYTES NFR BLD AUTO: 12.2 % (ref 19.6–45.3)
Lab: ABNORMAL
MAGNESIUM SERPL-MCNC: 2.4 MG/DL (ref 1.6–2.6)
MCH RBC QN AUTO: 29.6 PG (ref 26.6–33)
MCHC RBC AUTO-ENTMCNC: 33.5 G/DL (ref 31.5–35.7)
MCV RBC AUTO: 88.4 FL (ref 79–97)
METHGB BLD QL: <1 % (ref 0–3)
MODALITY: ABNORMAL
MONOCYTES # BLD AUTO: 1.22 10*3/MM3 (ref 0.1–0.9)
MONOCYTES NFR BLD AUTO: 8 % (ref 5–12)
NEUTROPHILS NFR BLD AUTO: 11.74 10*3/MM3 (ref 1.7–7)
NEUTROPHILS NFR BLD AUTO: 77.4 % (ref 42.7–76)
NOTE: ABNORMAL
NRBC BLD AUTO-RTO: 0 /100 WBC (ref 0–0.2)
OXYHGB MFR BLDV: 97.2 % (ref 94–99)
PCO2 BLDA: 44.1 MM HG (ref 35–45)
PCO2 TEMP ADJ BLD: ABNORMAL MM[HG]
PH BLDA: 7.43 PH UNITS (ref 7.35–7.45)
PH, TEMP CORRECTED: ABNORMAL
PLATELET # BLD AUTO: 344 10*3/MM3 (ref 140–450)
PMV BLD AUTO: 10 FL (ref 6–12)
PO2 BLDA: 102 MM HG (ref 83–108)
PO2 TEMP ADJ BLD: ABNORMAL MM[HG]
POTASSIUM SERPL-SCNC: 4.2 MMOL/L (ref 3.5–5.2)
PROT SERPL-MCNC: 7.3 G/DL (ref 6–8.5)
RBC # BLD AUTO: 5.17 10*6/MM3 (ref 4.14–5.8)
SAO2 % BLDCOA: 98 % (ref 94–99)
SODIUM SERPL-SCNC: 139 MMOL/L (ref 136–145)
VENTILATOR MODE: ABNORMAL
WBC # BLD AUTO: 15.18 10*3/MM3 (ref 3.4–10.8)

## 2021-05-28 PROCEDURE — 83735 ASSAY OF MAGNESIUM: CPT | Performed by: HOSPITALIST

## 2021-05-28 PROCEDURE — 94799 UNLISTED PULMONARY SVC/PX: CPT

## 2021-05-28 PROCEDURE — 99232 SBSQ HOSP IP/OBS MODERATE 35: CPT | Performed by: INTERNAL MEDICINE

## 2021-05-28 PROCEDURE — 25010000002 CEFTRIAXONE PER 250 MG: Performed by: HOSPITALIST

## 2021-05-28 PROCEDURE — 25010000002 AZITHROMYCIN PER 500 MG: Performed by: HOSPITALIST

## 2021-05-28 PROCEDURE — 25010000002 DEXAMETHASONE PER 1 MG: Performed by: HOSPITALIST

## 2021-05-28 PROCEDURE — 94660 CPAP INITIATION&MGMT: CPT

## 2021-05-28 PROCEDURE — 99233 SBSQ HOSP IP/OBS HIGH 50: CPT | Performed by: HOSPITALIST

## 2021-05-28 PROCEDURE — 82550 ASSAY OF CK (CPK): CPT | Performed by: HOSPITALIST

## 2021-05-28 PROCEDURE — 83050 HGB METHEMOGLOBIN QUAN: CPT

## 2021-05-28 PROCEDURE — 85025 COMPLETE CBC W/AUTO DIFF WBC: CPT | Performed by: HOSPITALIST

## 2021-05-28 PROCEDURE — 25010000002 ENOXAPARIN PER 10 MG: Performed by: HOSPITALIST

## 2021-05-28 PROCEDURE — 82375 ASSAY CARBOXYHB QUANT: CPT

## 2021-05-28 PROCEDURE — 82728 ASSAY OF FERRITIN: CPT | Performed by: HOSPITALIST

## 2021-05-28 PROCEDURE — 86140 C-REACTIVE PROTEIN: CPT | Performed by: HOSPITALIST

## 2021-05-28 PROCEDURE — 36600 WITHDRAWAL OF ARTERIAL BLOOD: CPT

## 2021-05-28 PROCEDURE — 80053 COMPREHEN METABOLIC PANEL: CPT | Performed by: HOSPITALIST

## 2021-05-28 PROCEDURE — 82805 BLOOD GASES W/O2 SATURATION: CPT

## 2021-05-28 RX ADMIN — OXYCODONE HYDROCHLORIDE AND ACETAMINOPHEN 1000 MG: 500 TABLET ORAL at 08:17

## 2021-05-28 RX ADMIN — CEFTRIAXONE SODIUM 2 G: 2 INJECTION, POWDER, FOR SOLUTION INTRAMUSCULAR; INTRAVENOUS at 21:53

## 2021-05-28 RX ADMIN — SODIUM CHLORIDE, PRESERVATIVE FREE 10 ML: 5 INJECTION INTRAVENOUS at 21:54

## 2021-05-28 RX ADMIN — ENOXAPARIN SODIUM 70 MG: 80 INJECTION SUBCUTANEOUS at 08:17

## 2021-05-28 RX ADMIN — SODIUM CHLORIDE, PRESERVATIVE FREE 10 ML: 5 INJECTION INTRAVENOUS at 21:53

## 2021-05-28 RX ADMIN — DEXAMETHASONE SODIUM PHOSPHATE 6 MG: 4 INJECTION, SOLUTION INTRA-ARTICULAR; INTRALESIONAL; INTRAMUSCULAR; INTRAVENOUS; SOFT TISSUE at 08:17

## 2021-05-28 RX ADMIN — AZITHROMYCIN MONOHYDRATE 500 MG: 500 INJECTION, POWDER, LYOPHILIZED, FOR SOLUTION INTRAVENOUS at 12:13

## 2021-05-28 RX ADMIN — Medication 220 MG: at 08:17

## 2021-05-28 RX ADMIN — REMDESIVIR 100 MG: 100 INJECTION, POWDER, LYOPHILIZED, FOR SOLUTION INTRAVENOUS at 06:19

## 2021-05-28 RX ADMIN — PANTOPRAZOLE SODIUM 40 MG: 40 TABLET, DELAYED RELEASE ORAL at 06:19

## 2021-05-28 RX ADMIN — SODIUM CHLORIDE, PRESERVATIVE FREE 10 ML: 5 INJECTION INTRAVENOUS at 08:17

## 2021-05-28 RX ADMIN — SODIUM CHLORIDE, PRESERVATIVE FREE 10 ML: 5 INJECTION INTRAVENOUS at 08:20

## 2021-05-28 RX ADMIN — FLUOXETINE HYDROCHLORIDE 40 MG: 20 CAPSULE ORAL at 21:53

## 2021-05-28 RX ADMIN — Medication 1 CAPSULE: at 08:17

## 2021-05-29 LAB
A-A DO2: 122.1 MMHG (ref 0–300)
ALBUMIN SERPL-MCNC: 3.15 G/DL (ref 3.5–5.2)
ALBUMIN/GLOB SERPL: 0.8 G/DL
ALP SERPL-CCNC: 67 U/L (ref 39–117)
ALT SERPL W P-5'-P-CCNC: 53 U/L (ref 1–41)
ANION GAP SERPL CALCULATED.3IONS-SCNC: 10.7 MMOL/L (ref 5–15)
ARTERIAL PATENCY WRIST A: ABNORMAL
AST SERPL-CCNC: 28 U/L (ref 1–40)
ATMOSPHERIC PRESS: 723 MMHG
BACTERIA SPEC AEROBE CULT: NORMAL
BACTERIA SPEC AEROBE CULT: NORMAL
BASE EXCESS BLDA CALC-SCNC: 2.5 MMOL/L (ref 0–2)
BASOPHILS # BLD AUTO: 0.03 10*3/MM3 (ref 0–0.2)
BASOPHILS NFR BLD AUTO: 0.2 % (ref 0–1.5)
BDY SITE: ABNORMAL
BILIRUB SERPL-MCNC: 0.4 MG/DL (ref 0–1.2)
BODY TEMPERATURE: 0 C
BUN SERPL-MCNC: 25 MG/DL (ref 6–20)
BUN/CREAT SERPL: 29.1 (ref 7–25)
CALCIUM SPEC-SCNC: 8.4 MG/DL (ref 8.6–10.5)
CHLORIDE SERPL-SCNC: 104 MMOL/L (ref 98–107)
CO2 BLDA-SCNC: 29.5 MMOL/L (ref 22–33)
CO2 SERPL-SCNC: 24.3 MMOL/L (ref 22–29)
COHGB MFR BLD: 0.6 % (ref 0–5)
CPAP: 10 CMH2O
CREAT SERPL-MCNC: 0.86 MG/DL (ref 0.76–1.27)
CRP SERPL-MCNC: 1.38 MG/DL (ref 0–0.5)
D DIMER PPP FEU-MCNC: 0.4 MCGFEU/ML (ref 0–0.5)
DEPRECATED RDW RBC AUTO: 37 FL (ref 37–54)
EOSINOPHIL # BLD AUTO: 0.01 10*3/MM3 (ref 0–0.4)
EOSINOPHIL NFR BLD AUTO: 0.1 % (ref 0.3–6.2)
ERYTHROCYTE [DISTWIDTH] IN BLOOD BY AUTOMATED COUNT: 11.5 % (ref 12.3–15.4)
FERRITIN SERPL-MCNC: 1271 NG/ML (ref 30–400)
GFR SERPL CREATININE-BSD FRML MDRD: 101 ML/MIN/1.73
GLOBULIN UR ELPH-MCNC: 3.8 GM/DL
GLUCOSE SERPL-MCNC: 80 MG/DL (ref 65–99)
HCO3 BLDA-SCNC: 28.1 MMOL/L (ref 20–26)
HCT VFR BLD AUTO: 45.8 % (ref 37.5–51)
HCT VFR BLD CALC: 48.4 % (ref 38–51)
HGB BLD-MCNC: 15.6 G/DL (ref 13–17.7)
HGB BLDA-MCNC: 15.8 G/DL (ref 14–18)
IMM GRANULOCYTES # BLD AUTO: 0.28 10*3/MM3 (ref 0–0.05)
IMM GRANULOCYTES NFR BLD AUTO: 1.9 % (ref 0–0.5)
INHALED O2 CONCENTRATION: 40 %
LDH SERPL-CCNC: 251 U/L (ref 135–225)
LYMPHOCYTES # BLD AUTO: 2.16 10*3/MM3 (ref 0.7–3.1)
LYMPHOCYTES NFR BLD AUTO: 14.7 % (ref 19.6–45.3)
Lab: ABNORMAL
MAGNESIUM SERPL-MCNC: 2.4 MG/DL (ref 1.6–2.6)
MCH RBC QN AUTO: 30.1 PG (ref 26.6–33)
MCHC RBC AUTO-ENTMCNC: 34.1 G/DL (ref 31.5–35.7)
MCV RBC AUTO: 88.2 FL (ref 79–97)
METHGB BLD QL: 0.1 % (ref 0–3)
MODALITY: ABNORMAL
MONOCYTES # BLD AUTO: 1.3 10*3/MM3 (ref 0.1–0.9)
MONOCYTES NFR BLD AUTO: 8.8 % (ref 5–12)
NEUTROPHILS NFR BLD AUTO: 10.94 10*3/MM3 (ref 1.7–7)
NEUTROPHILS NFR BLD AUTO: 74.3 % (ref 42.7–76)
NOTE: ABNORMAL
NRBC BLD AUTO-RTO: 0 /100 WBC (ref 0–0.2)
OXYHGB MFR BLDV: 97.1 % (ref 94–99)
PCO2 BLDA: 45.6 MM HG (ref 35–45)
PCO2 TEMP ADJ BLD: ABNORMAL MM[HG]
PH BLDA: 7.4 PH UNITS (ref 7.35–7.45)
PH, TEMP CORRECTED: ABNORMAL
PLATELET # BLD AUTO: 289 10*3/MM3 (ref 140–450)
PMV BLD AUTO: 10.4 FL (ref 6–12)
PO2 BLDA: 98.4 MM HG (ref 83–108)
PO2 TEMP ADJ BLD: ABNORMAL MM[HG]
POTASSIUM SERPL-SCNC: 4.2 MMOL/L (ref 3.5–5.2)
PROT SERPL-MCNC: 6.9 G/DL (ref 6–8.5)
RBC # BLD AUTO: 5.19 10*6/MM3 (ref 4.14–5.8)
SAO2 % BLDCOA: 97.8 % (ref 94–99)
SODIUM SERPL-SCNC: 139 MMOL/L (ref 136–145)
VENTILATOR MODE: ABNORMAL
WBC # BLD AUTO: 14.72 10*3/MM3 (ref 3.4–10.8)

## 2021-05-29 PROCEDURE — 80053 COMPREHEN METABOLIC PANEL: CPT | Performed by: HOSPITALIST

## 2021-05-29 PROCEDURE — 99232 SBSQ HOSP IP/OBS MODERATE 35: CPT | Performed by: HOSPITALIST

## 2021-05-29 PROCEDURE — 94799 UNLISTED PULMONARY SVC/PX: CPT

## 2021-05-29 PROCEDURE — 25010000002 CEFTRIAXONE PER 250 MG: Performed by: HOSPITALIST

## 2021-05-29 PROCEDURE — 82728 ASSAY OF FERRITIN: CPT | Performed by: HOSPITALIST

## 2021-05-29 PROCEDURE — 36600 WITHDRAWAL OF ARTERIAL BLOOD: CPT

## 2021-05-29 PROCEDURE — 86140 C-REACTIVE PROTEIN: CPT | Performed by: HOSPITALIST

## 2021-05-29 PROCEDURE — 83735 ASSAY OF MAGNESIUM: CPT | Performed by: HOSPITALIST

## 2021-05-29 PROCEDURE — 25010000002 ENOXAPARIN PER 10 MG: Performed by: HOSPITALIST

## 2021-05-29 PROCEDURE — 83050 HGB METHEMOGLOBIN QUAN: CPT

## 2021-05-29 PROCEDURE — 94660 CPAP INITIATION&MGMT: CPT

## 2021-05-29 PROCEDURE — 82375 ASSAY CARBOXYHB QUANT: CPT

## 2021-05-29 PROCEDURE — 85379 FIBRIN DEGRADATION QUANT: CPT | Performed by: HOSPITALIST

## 2021-05-29 PROCEDURE — 82805 BLOOD GASES W/O2 SATURATION: CPT

## 2021-05-29 PROCEDURE — 85025 COMPLETE CBC W/AUTO DIFF WBC: CPT | Performed by: HOSPITALIST

## 2021-05-29 PROCEDURE — 83615 LACTATE (LD) (LDH) ENZYME: CPT | Performed by: HOSPITALIST

## 2021-05-29 PROCEDURE — 25010000002 DEXAMETHASONE PER 1 MG: Performed by: HOSPITALIST

## 2021-05-29 RX ADMIN — SODIUM CHLORIDE, PRESERVATIVE FREE 10 ML: 5 INJECTION INTRAVENOUS at 08:03

## 2021-05-29 RX ADMIN — DEXAMETHASONE SODIUM PHOSPHATE 6 MG: 4 INJECTION, SOLUTION INTRA-ARTICULAR; INTRALESIONAL; INTRAMUSCULAR; INTRAVENOUS; SOFT TISSUE at 08:01

## 2021-05-29 RX ADMIN — CEFTRIAXONE SODIUM 2 G: 2 INJECTION, POWDER, FOR SOLUTION INTRAMUSCULAR; INTRAVENOUS at 21:53

## 2021-05-29 RX ADMIN — SODIUM CHLORIDE, PRESERVATIVE FREE 10 ML: 5 INJECTION INTRAVENOUS at 21:53

## 2021-05-29 RX ADMIN — Medication 1 CAPSULE: at 08:02

## 2021-05-29 RX ADMIN — PANTOPRAZOLE SODIUM 40 MG: 40 TABLET, DELAYED RELEASE ORAL at 06:37

## 2021-05-29 RX ADMIN — ENOXAPARIN SODIUM 70 MG: 80 INJECTION SUBCUTANEOUS at 08:01

## 2021-05-29 RX ADMIN — SODIUM CHLORIDE, PRESERVATIVE FREE 10 ML: 5 INJECTION INTRAVENOUS at 21:58

## 2021-05-29 RX ADMIN — Medication 220 MG: at 08:02

## 2021-05-29 RX ADMIN — OXYCODONE HYDROCHLORIDE AND ACETAMINOPHEN 1000 MG: 500 TABLET ORAL at 08:02

## 2021-05-29 RX ADMIN — FLUOXETINE HYDROCHLORIDE 40 MG: 20 CAPSULE ORAL at 21:58

## 2021-05-30 ENCOUNTER — READMISSION MANAGEMENT (OUTPATIENT)
Dept: CALL CENTER | Facility: HOSPITAL | Age: 36
End: 2021-05-30

## 2021-05-30 VITALS
TEMPERATURE: 98 F | WEIGHT: 288.7 LBS | DIASTOLIC BLOOD PRESSURE: 72 MMHG | HEIGHT: 72 IN | HEART RATE: 56 BPM | RESPIRATION RATE: 20 BRPM | OXYGEN SATURATION: 90 % | BODY MASS INDEX: 39.1 KG/M2 | SYSTOLIC BLOOD PRESSURE: 126 MMHG

## 2021-05-30 PROBLEM — D89.833 CYTOKINE RELEASE SYNDROME, GRADE 3: Status: ACTIVE | Noted: 2021-05-30

## 2021-05-30 PROCEDURE — 94799 UNLISTED PULMONARY SVC/PX: CPT

## 2021-05-30 PROCEDURE — 25010000002 ENOXAPARIN PER 10 MG: Performed by: HOSPITALIST

## 2021-05-30 PROCEDURE — 25010000002 DEXAMETHASONE PER 1 MG: Performed by: HOSPITALIST

## 2021-05-30 PROCEDURE — 99239 HOSP IP/OBS DSCHRG MGMT >30: CPT | Performed by: HOSPITALIST

## 2021-05-30 RX ORDER — ZINC SULFATE 50(220)MG
220 CAPSULE ORAL DAILY
Qty: 7 CAPSULE | Refills: 0 | Status: SHIPPED | OUTPATIENT
Start: 2021-05-30 | End: 2021-05-30

## 2021-05-30 RX ORDER — L.ACID,PARA/B.BIFIDUM/S.THERM 8B CELL
1 CAPSULE ORAL DAILY
Qty: 3 CAPSULE | Refills: 0 | Status: SHIPPED | OUTPATIENT
Start: 2021-05-31 | End: 2021-05-30

## 2021-05-30 RX ORDER — CEFDINIR 300 MG/1
300 CAPSULE ORAL 2 TIMES DAILY
Qty: 4 CAPSULE | Refills: 0 | Status: SHIPPED | OUTPATIENT
Start: 2021-05-30 | End: 2021-05-30 | Stop reason: SDUPTHER

## 2021-05-30 RX ORDER — DEXAMETHASONE 6 MG/1
6 TABLET ORAL
Qty: 3 TABLET | Refills: 0 | Status: SHIPPED | OUTPATIENT
Start: 2021-05-31 | End: 2021-05-30 | Stop reason: SDUPTHER

## 2021-05-30 RX ORDER — UREA 10 %
220 LOTION (ML) TOPICAL DAILY
Qty: 7 EACH | Refills: 0 | Status: SHIPPED | OUTPATIENT
Start: 2021-05-30 | End: 2021-06-06

## 2021-05-30 RX ORDER — CEFDINIR 300 MG/1
300 CAPSULE ORAL 2 TIMES DAILY
Qty: 4 CAPSULE | Refills: 0 | Status: SHIPPED | OUTPATIENT
Start: 2021-05-30 | End: 2021-06-01

## 2021-05-30 RX ORDER — DEXAMETHASONE 6 MG/1
6 TABLET ORAL
Qty: 3 TABLET | Refills: 0 | Status: SHIPPED | OUTPATIENT
Start: 2021-05-31 | End: 2021-06-03

## 2021-05-30 RX ORDER — L.ACID,PARA/B.BIFIDUM/S.THERM 8B CELL
1 CAPSULE ORAL DAILY
Qty: 3 CAPSULE | Refills: 0 | Status: SHIPPED | OUTPATIENT
Start: 2021-05-31 | End: 2021-06-03

## 2021-05-30 RX ADMIN — SODIUM CHLORIDE, PRESERVATIVE FREE 10 ML: 5 INJECTION INTRAVENOUS at 08:07

## 2021-05-30 RX ADMIN — DEXAMETHASONE SODIUM PHOSPHATE 6 MG: 4 INJECTION, SOLUTION INTRA-ARTICULAR; INTRALESIONAL; INTRAMUSCULAR; INTRAVENOUS; SOFT TISSUE at 08:06

## 2021-05-30 RX ADMIN — Medication 1 CAPSULE: at 08:07

## 2021-05-30 RX ADMIN — Medication 220 MG: at 08:07

## 2021-05-30 RX ADMIN — OXYCODONE HYDROCHLORIDE AND ACETAMINOPHEN 1000 MG: 500 TABLET ORAL at 08:07

## 2021-05-30 RX ADMIN — ENOXAPARIN SODIUM 70 MG: 80 INJECTION SUBCUTANEOUS at 08:07

## 2021-05-30 NOTE — OUTREACH NOTE
Prep Survey      Responses   [facility] discharged from?  [facility]   Is LACE score < 7 ?  No   Emergency Room discharge w/ pulse ox?  No   Eligibility  Readm Mgmt   Discharge diagnosis   COVID 19 pnuemonia    Does the patient have one of the following disease processes/diagnoses(primary or secondary)?  COVID-19   Does the patient have Home health ordered?  No   Is there a DME ordered?  No   Prep survey completed?  Yes          Nereyda Truong RN

## 2021-05-31 ENCOUNTER — READMISSION MANAGEMENT (OUTPATIENT)
Dept: CALL CENTER | Facility: HOSPITAL | Age: 36
End: 2021-05-31

## 2021-05-31 NOTE — OUTREACH NOTE
COVID-19 Week 1 Survey      Responses   [facility] patient discharged from?  [facility]   Does the patient have one of the following disease processes/diagnoses(primary or secondary)?  COVID-19   COVID-19 underlying condition?  None   Call Number  Call 1   Week 1 Call successful?  Yes   Call start time  1209   Call end time  1211   Discharge diagnosis   COVID 19 pnuemonia    Is patient permission given to speak with other caregiver?  Yes   List who call center can speak with  PHAM WALDEN   Person spoke with today (if not patient) and relationship  PHAM WALDEN- WIFE   Meds reviewed with patient/caregiver?  Yes   Is the patient having any side effects they believe may be caused by any medication additions or changes?  No   Does the patient have all medications ordered at discharge?  Yes   Is the patient taking all medications as directed (includes completed medication regime)?  Yes   Does the patient have a primary care provider?   Yes   Comments regarding PCP  WIFE STATES SHE WILL CALL TOMORROW TO MAKE PATIENT A PCP APPOINTMENT   Does the patient have an appointment with their PCP or specialist within 7 days of discharge?  No   What is preventing the patient from scheduling follow up appointments within 7 days of discharge?  Haven't had time   Nursing Interventions  Educated patient on importance of making appointment, Advised patient to make appointment   Has the patient kept scheduled appointments due by today?  N/A   Has home health visited the patient within 72 hours of discharge?  N/A   Psychosocial issues?  No   Did the patient receive a copy of their discharge instructions?  Yes   Did the patient receive a copy of COVID-19 specific instructions?  Yes   Nursing interventions  Reviewed instructions with patient   What is the patient's perception of their health status since discharge?  Improving   Does the patient have any of the following symptoms?  None   Nursing Interventions  Nurse provided patient  education   Pulse Ox monitoring  Intermittent   Pulse Ox device source  Patient   O2 Sat comments  WIFE STATES 02 SAT HAS BEEN RUNNING ABOUT 96%   O2 Sat: education provided  Sat levels, Monitoring frequency, When to seek care   Is the patient/caregiver able to teach back steps to recovery at home?  Set small, achievable goals for return to baseline health, Rest and rebuild strength, gradually increase activity, Make a list of questions for provider's appointment   If the patient is a current smoker, are they able to teach back resources for cessation?  Not a smoker   Is the patient/caregiver able to teach back the hierarchy of who to call/visit for symptoms/problems? PCP, Specialist, Home health nurse, Urgent Care, ED, 911  Yes   COVID-19 call completed?  Yes          Keturah Puckett LPN

## 2021-06-01 ENCOUNTER — READMISSION MANAGEMENT (OUTPATIENT)
Dept: CALL CENTER | Facility: HOSPITAL | Age: 36
End: 2021-06-01

## 2021-06-01 NOTE — OUTREACH NOTE
COVID-19 Week 1 Survey      Responses   [facility] patient discharged from?  [facility]   Does the patient have one of the following disease processes/diagnoses(primary or secondary)?  COVID-19   COVID-19 underlying condition?  None   Call Number  Call 2   Week 1 Call successful?  Yes   Call start time  0817   Call end time  0820   Meds reviewed with patient/caregiver?  Yes   Is the patient having any side effects they believe may be caused by any medication additions or changes?  No   Does the patient have all medications ordered at discharge?  Yes   Is the patient taking all medications as directed (includes completed medication regime)?  Yes   Comments regarding appointments  States wife making PCP appt now.   Does the patient have a primary care provider?   Yes   Does the patient have an appointment with their PCP or specialist within 7 days of discharge?  No   What is preventing the patient from scheduling follow up appointments within 7 days of discharge?  Haven't had time   Nursing Interventions  Advised patient to make appointment, Educated patient on importance of making appointment   Has the patient kept scheduled appointments due by today?  N/A   Has home health visited the patient within 72 hours of discharge?  N/A   Psychosocial issues?  No   What is the patient's perception of their health status since discharge?  Improving   Does the patient have any of the following symptoms?  Cough [Slight cough.]   Pulse Ox monitoring  Intermittent   Pulse Ox device source  Patient   O2 Sat comments  96-97% on RA.   Is the patient/caregiver able to teach back the hierarchy of who to call/visit for symptoms/problems? PCP, Specialist, Home health nurse, Urgent Care, ED, 911  Yes   COVID-19 call completed?  Yes   Wrap up additional comments  States is doing well. States only s/s is a slight cough. Denies any needs today. States quarantine complete.          Emma Uriarte RN

## 2021-06-02 ENCOUNTER — READMISSION MANAGEMENT (OUTPATIENT)
Dept: CALL CENTER | Facility: HOSPITAL | Age: 36
End: 2021-06-02

## 2021-06-02 NOTE — OUTREACH NOTE
COVID-19 Week 1 Survey      Responses   [facility] patient discharged from?  [facility]   Does the patient have one of the following disease processes/diagnoses(primary or secondary)?  COVID-19   COVID-19 underlying condition?  None   Call Number  Call 3   Week 1 Call successful?  Yes   Call start time  0828   Call end time  0832   Is patient permission given to speak with other caregiver?  Yes   Person spoke with today (if not patient) and relationship  Neli-wife   Meds reviewed with patient/caregiver?  Yes   Is the patient having any side effects they believe may be caused by any medication additions or changes?  No   Does the patient have all medications ordered at discharge?  Yes   Is the patient taking all medications as directed (includes completed medication regime)?  Yes   Comments regarding appointments  PCP appt 06/04/21.   Does the patient have a primary care provider?   Yes   Does the patient have an appointment with their PCP or specialist within 7 days of discharge?  Yes   Has the patient kept scheduled appointments due by today?  N/A   What is the patient's perception of their health status since discharge?  Improving   Does the patient have any of the following symptoms?  -- [Slight cough, some insomnia past 2 nights.]   Nursing Interventions  Nurse provided patient education   Pulse Ox monitoring  Intermittent   Pulse Ox device source  Patient   O2 Sat comments  95-97% on RA.   Is the patient/caregiver able to teach back the hierarchy of who to call/visit for symptoms/problems? PCP, Specialist, Home health nurse, Urgent Care, ED, 911  Yes   COVID-19 call completed?  Yes   Wrap up additional comments  Wife states patient is improving. States still has slight cough-some insomnia past 2 nights that believes is related to steroids. Denies any needs today.          Emma Uriarte RN

## 2021-06-08 ENCOUNTER — READMISSION MANAGEMENT (OUTPATIENT)
Dept: CALL CENTER | Facility: HOSPITAL | Age: 36
End: 2021-06-08

## 2021-06-08 NOTE — OUTREACH NOTE
COVID-19 Week 2 Survey      Responses   [facility] patient discharged from?  [facility]   Does the patient have one of the following disease processes/diagnoses(primary or secondary)?  COVID-19   COVID-19 underlying condition?  None   Call Number  Call 1   COVID-19 Week 2: Call 1 attempt successful?  Yes   Call start time  0923   Call end time  0926   Discharge diagnosis   COVID 19 pnuemonia    Meds reviewed with patient/caregiver?  Yes   Is the patient having any side effects they believe may be caused by any medication additions or changes?  No   Does the patient have all medications ordered at discharge?  Yes   Is the patient taking all medications as directed (includes completed medication regime)?  Yes   Does the patient have a primary care provider?   Yes   Does the patient have an appointment with their PCP or specialist within 7 days of discharge?  Yes   Has the patient kept scheduled appointments due by today?  Yes   Has home health visited the patient within 72 hours of discharge?  N/A   Psychosocial issues?  No   Did the patient receive a copy of their discharge instructions?  Yes   Did the patient receive a copy of COVID-19 specific instructions?  Yes   Nursing interventions  Reviewed instructions with patient   What is the patient's perception of their health status since discharge?  Improving   Does the patient have any of the following symptoms?  None [fatigue]   Pulse Ox monitoring  Intermittent   Pulse Ox device source  Patient   O2 Sat comments  95-96% on RA   O2 Sat: education provided  Sat levels, When to seek care   Is the patient/caregiver able to teach back steps to recovery at home?  Eat a well-balance diet   Is the patient/caregiver able to teach back the hierarchy of who to call/visit for symptoms/problems? PCP, Specialist, Home health nurse, Urgent Care, ED, 911  Yes   COVID-19 call completed?  Yes          Theresa San RN

## 2021-06-14 ENCOUNTER — READMISSION MANAGEMENT (OUTPATIENT)
Dept: CALL CENTER | Facility: HOSPITAL | Age: 36
End: 2021-06-14

## 2021-06-14 NOTE — OUTREACH NOTE
COVID-19 Week 3 Survey      Responses   [facility] patient discharged from?  [facility]   Does the patient have one of the following disease processes/diagnoses(primary or secondary)?  COVID-19   COVID-19 underlying condition?  None   Call Number  Call 1   COVID-19 Week 3: Call 1 attempt successful?  Yes   Call start time  0813   Call end time  0815   Is patient permission given to speak with other caregiver?  Yes   Person spoke with today (if not patient) and relationship  Neli-wife   What is the patient's perception of their health status since discharge?  Improving   Does the patient have any of the following symptoms?  None [Still slight fatigue.]   Pulse Ox monitoring  Intermittent   Pulse Ox device source  Patient   O2 Sat comments  95-96% on RA.   COVID-19 call completed?  Yes   Wrap up additional comments  Brief call with wife who states patient has returned to work. States still slight fatigue but improved. Denies any needs today.          Emma Uriarte RN

## 2021-06-21 ENCOUNTER — READMISSION MANAGEMENT (OUTPATIENT)
Dept: CALL CENTER | Facility: HOSPITAL | Age: 36
End: 2021-06-21

## 2021-06-21 NOTE — OUTREACH NOTE
COVID-19 Week 4 Survey      Responses   [facility] patient discharged from?  [facility]   Does the patient have one of the following disease processes/diagnoses(primary or secondary)?  COVID-19   COVID-19 underlying condition?  None   Call Number  Call 1   COVID-19 Week 4: Call 1 attempt successful?  Yes   Call start time  1540   Call end time  1543   Discharge diagnosis   COVID 19 pnuemonia    Meds reviewed with patient/caregiver?  Yes   Is the patient having any side effects they believe may be caused by any medication additions or changes?  No   Does the patient have all medications ordered at discharge?  Yes   Is the patient taking all medications as directed (includes completed medication regime)?  Yes   Has the patient kept scheduled appointments due by today?  Yes   Is the patient still receiving Home Health Services?  N/A   What is the patient's perception of their health status since discharge?  Improving   Does the patient have any of the following symptoms?  None   Nursing Interventions  Nurse provided patient education   Pulse Ox monitoring  Intermittent   Pulse Ox device source  Patient   O2 Sat comments  96% on RA HR 89   O2 Sat: education provided  Sat levels, Monitoring frequency, When to seek care   O2 Sat education comments  If 90% or below and stays down, call 911   Is the patient/caregiver able to teach back steps to recovery at home?  Eat a well-balance diet, Set small, achievable goals for return to baseline health, Rest and rebuild strength, gradually increase activity   If the patient is a current smoker, are they able to teach back resources for cessation?  Not a smoker   Is the patient/caregiver able to teach back the hierarchy of who to call/visit for symptoms/problems? PCP, Specialist, Home health nurse, Urgent Care, ED, 911  Yes   Is the patient interested in additional calls from an ambulatory ?  NOTE:  applies to high risk patients requiring additional follow-up.  No    Did the patient feel the follow up calls were helpful during their recovery period?  Yes   Was the number of calls appropriate?  Yes   Interested in COVID-19 Plasma Donation?  Yes   Wrap up additional comments  He is doing better he says. Encouraged rest.          Juli Fuller RN

## 2021-07-13 ENCOUNTER — TRANSCRIBE ORDERS (OUTPATIENT)
Dept: ADMINISTRATIVE | Facility: HOSPITAL | Age: 36
End: 2021-07-13

## 2021-07-13 ENCOUNTER — HOSPITAL ENCOUNTER (OUTPATIENT)
Dept: GENERAL RADIOLOGY | Facility: HOSPITAL | Age: 36
Discharge: HOME | End: 2021-07-13
Admitting: NURSE PRACTITIONER

## 2021-07-13 DIAGNOSIS — R07.9 CHEST PAIN, UNSPECIFIED TYPE: Primary | ICD-10-CM

## 2021-07-13 DIAGNOSIS — R07.9 CHEST PAIN, UNSPECIFIED TYPE: ICD-10-CM

## 2021-07-13 PROCEDURE — 71046 X-RAY EXAM CHEST 2 VIEWS: CPT

## 2021-07-13 PROCEDURE — 71046 X-RAY EXAM CHEST 2 VIEWS: CPT | Performed by: RADIOLOGY

## 2021-07-19 ENCOUNTER — TRANSCRIBE ORDERS (OUTPATIENT)
Dept: ADMINISTRATIVE | Facility: HOSPITAL | Age: 36
End: 2021-07-19

## 2021-07-19 DIAGNOSIS — R07.9 CHEST PAIN, UNSPECIFIED TYPE: Primary | ICD-10-CM

## 2021-07-23 ENCOUNTER — APPOINTMENT (OUTPATIENT)
Dept: CARDIOLOGY | Facility: HOSPITAL | Age: 36
End: 2021-07-23

## 2021-08-05 ENCOUNTER — HOSPITAL ENCOUNTER (OUTPATIENT)
Dept: CARDIOLOGY | Facility: HOSPITAL | Age: 36
Discharge: HOME | End: 2021-08-05
Admitting: NURSE PRACTITIONER

## 2021-08-05 DIAGNOSIS — R07.9 CHEST PAIN, UNSPECIFIED TYPE: ICD-10-CM

## 2021-08-05 PROCEDURE — 93306 TTE W/DOPPLER COMPLETE: CPT | Performed by: INTERNAL MEDICINE

## 2021-08-05 PROCEDURE — 93306 TTE W/DOPPLER COMPLETE: CPT

## 2021-08-06 LAB
BH CV ECHO MEAS - ACS: 2.3 CM
BH CV ECHO MEAS - AO ROOT AREA (BSA CORRECTED): 1.3
BH CV ECHO MEAS - AO ROOT AREA: 8.3 CM^2
BH CV ECHO MEAS - AO ROOT DIAM: 3.3 CM
BH CV ECHO MEAS - BSA(HAYCOCK): 2.6 M^2
BH CV ECHO MEAS - BSA: 2.5 M^2
BH CV ECHO MEAS - BZI_BMI: 39.1 KILOGRAMS/M^2
BH CV ECHO MEAS - BZI_METRIC_HEIGHT: 182.9 CM
BH CV ECHO MEAS - BZI_METRIC_WEIGHT: 130.6 KG
BH CV ECHO MEAS - EDV(CUBED): 68.9 ML
BH CV ECHO MEAS - EDV(MOD-SP4): 90 ML
BH CV ECHO MEAS - EDV(TEICH): 74.2 ML
BH CV ECHO MEAS - EF(CUBED): 20.4 %
BH CV ECHO MEAS - EF(MOD-SP4): 39.7 %
BH CV ECHO MEAS - EF(TEICH): 16.5 %
BH CV ECHO MEAS - ESV(CUBED): 54.9 ML
BH CV ECHO MEAS - ESV(MOD-SP4): 54.3 ML
BH CV ECHO MEAS - ESV(TEICH): 62 ML
BH CV ECHO MEAS - FS: 7.3 %
BH CV ECHO MEAS - IVS/LVPW: 1.1
BH CV ECHO MEAS - IVSD: 1.7 CM
BH CV ECHO MEAS - LA DIMENSION: 3.8 CM
BH CV ECHO MEAS - LA/AO: 1.2
BH CV ECHO MEAS - LV DIASTOLIC VOL/BSA (35-75): 36.2 ML/M^2
BH CV ECHO MEAS - LV MASS(C)D: 266.9 GRAMS
BH CV ECHO MEAS - LV MASS(C)DI: 107.3 GRAMS/M^2
BH CV ECHO MEAS - LV SYSTOLIC VOL/BSA (12-30): 21.8 ML/M^2
BH CV ECHO MEAS - LVIDD: 4.1 CM
BH CV ECHO MEAS - LVIDS: 3.8 CM
BH CV ECHO MEAS - LVLD AP4: 8.8 CM
BH CV ECHO MEAS - LVLS AP4: 8.7 CM
BH CV ECHO MEAS - LVOT AREA (M): 3.5 CM^2
BH CV ECHO MEAS - LVOT AREA: 3.5 CM^2
BH CV ECHO MEAS - LVOT DIAM: 2.1 CM
BH CV ECHO MEAS - LVPWD: 1.5 CM
BH CV ECHO MEAS - MV A MAX VEL: 64.8 CM/SEC
BH CV ECHO MEAS - MV E MAX VEL: 97.8 CM/SEC
BH CV ECHO MEAS - MV E/A: 1.5
BH CV ECHO MEAS - PA ACC TIME: 0.13 SEC
BH CV ECHO MEAS - PA PR(ACCEL): 18.7 MMHG
BH CV ECHO MEAS - SI(CUBED): 5.6 ML/M^2
BH CV ECHO MEAS - SI(MOD-SP4): 14.4 ML/M^2
BH CV ECHO MEAS - SI(TEICH): 4.9 ML/M^2
BH CV ECHO MEAS - SV(CUBED): 14 ML
BH CV ECHO MEAS - SV(MOD-SP4): 35.7 ML
BH CV ECHO MEAS - SV(TEICH): 12.3 ML
MAXIMAL PREDICTED HEART RATE: 184 BPM
STRESS TARGET HR: 156 BPM

## 2021-08-15 ENCOUNTER — HOSPITAL ENCOUNTER (OUTPATIENT)
Dept: HOSPITAL 79 - SLEEP-COR | Age: 36
End: 2021-08-15
Payer: COMMERCIAL

## 2021-08-15 DIAGNOSIS — G47.33: Primary | ICD-10-CM

## 2021-09-09 ENCOUNTER — HOSPITAL ENCOUNTER (EMERGENCY)
Facility: HOSPITAL | Age: 36
Discharge: HOME | End: 2021-09-09
Attending: EMERGENCY MEDICINE | Admitting: EMERGENCY MEDICINE

## 2021-09-09 ENCOUNTER — APPOINTMENT (OUTPATIENT)
Dept: GENERAL RADIOLOGY | Facility: HOSPITAL | Age: 36
End: 2021-09-09

## 2021-09-09 VITALS
RESPIRATION RATE: 18 BRPM | DIASTOLIC BLOOD PRESSURE: 83 MMHG | BODY MASS INDEX: 42.66 KG/M2 | HEIGHT: 72 IN | TEMPERATURE: 98.5 F | SYSTOLIC BLOOD PRESSURE: 138 MMHG | WEIGHT: 315 LBS | HEART RATE: 86 BPM | OXYGEN SATURATION: 100 %

## 2021-09-09 DIAGNOSIS — R07.89 ATYPICAL CHEST PAIN: Primary | ICD-10-CM

## 2021-09-09 LAB
ALBUMIN SERPL-MCNC: 4.72 G/DL (ref 3.5–5.2)
ALBUMIN/GLOB SERPL: 1.3 G/DL
ALP SERPL-CCNC: 84 U/L (ref 39–117)
ALT SERPL W P-5'-P-CCNC: 49 U/L (ref 1–41)
ANION GAP SERPL CALCULATED.3IONS-SCNC: 10.4 MMOL/L (ref 5–15)
APTT PPP: 32.1 SECONDS (ref 25.5–35.4)
AST SERPL-CCNC: 32 U/L (ref 1–40)
BASOPHILS # BLD AUTO: 0.09 10*3/MM3 (ref 0–0.2)
BASOPHILS NFR BLD AUTO: 0.8 % (ref 0–1.5)
BILIRUB SERPL-MCNC: 0.6 MG/DL (ref 0–1.2)
BUN SERPL-MCNC: 10 MG/DL (ref 6–20)
BUN/CREAT SERPL: 12 (ref 7–25)
CALCIUM SPEC-SCNC: 9.7 MG/DL (ref 8.6–10.5)
CHLORIDE SERPL-SCNC: 101 MMOL/L (ref 98–107)
CO2 SERPL-SCNC: 27.6 MMOL/L (ref 22–29)
CREAT SERPL-MCNC: 0.83 MG/DL (ref 0.76–1.27)
CRP SERPL-MCNC: 0.58 MG/DL (ref 0–0.5)
D DIMER PPP FEU-MCNC: <0.27 MCGFEU/ML (ref 0–0.5)
DEPRECATED RDW RBC AUTO: 39.5 FL (ref 37–54)
EOSINOPHIL # BLD AUTO: 0.18 10*3/MM3 (ref 0–0.4)
EOSINOPHIL NFR BLD AUTO: 1.6 % (ref 0.3–6.2)
ERYTHROCYTE [DISTWIDTH] IN BLOOD BY AUTOMATED COUNT: 12.1 % (ref 12.3–15.4)
GFR SERPL CREATININE-BSD FRML MDRD: 105 ML/MIN/1.73
GLOBULIN UR ELPH-MCNC: 3.7 GM/DL
GLUCOSE SERPL-MCNC: 109 MG/DL (ref 65–99)
HCT VFR BLD AUTO: 49.3 % (ref 37.5–51)
HGB BLD-MCNC: 16.6 G/DL (ref 13–17.7)
HOLD SPECIMEN: NORMAL
HOLD SPECIMEN: NORMAL
IMM GRANULOCYTES # BLD AUTO: 0.06 10*3/MM3 (ref 0–0.05)
IMM GRANULOCYTES NFR BLD AUTO: 0.5 % (ref 0–0.5)
INR PPP: 0.9 (ref 0.9–1.1)
LYMPHOCYTES # BLD AUTO: 2.49 10*3/MM3 (ref 0.7–3.1)
LYMPHOCYTES NFR BLD AUTO: 22.3 % (ref 19.6–45.3)
MCH RBC QN AUTO: 29.9 PG (ref 26.6–33)
MCHC RBC AUTO-ENTMCNC: 33.7 G/DL (ref 31.5–35.7)
MCV RBC AUTO: 88.7 FL (ref 79–97)
MONOCYTES # BLD AUTO: 0.95 10*3/MM3 (ref 0.1–0.9)
MONOCYTES NFR BLD AUTO: 8.5 % (ref 5–12)
NEUTROPHILS NFR BLD AUTO: 66.3 % (ref 42.7–76)
NEUTROPHILS NFR BLD AUTO: 7.39 10*3/MM3 (ref 1.7–7)
NRBC BLD AUTO-RTO: 0 /100 WBC (ref 0–0.2)
NT-PROBNP SERPL-MCNC: <5 PG/ML (ref 0–450)
PLATELET # BLD AUTO: 254 10*3/MM3 (ref 140–450)
PMV BLD AUTO: 10.8 FL (ref 6–12)
POTASSIUM SERPL-SCNC: 4.4 MMOL/L (ref 3.5–5.2)
PROT SERPL-MCNC: 8.4 G/DL (ref 6–8.5)
PROTHROMBIN TIME: 12.5 SECONDS (ref 12.8–14.5)
RBC # BLD AUTO: 5.56 10*6/MM3 (ref 4.14–5.8)
SODIUM SERPL-SCNC: 139 MMOL/L (ref 136–145)
TROPONIN T SERPL-MCNC: <0.01 NG/ML (ref 0–0.03)
TROPONIN T SERPL-MCNC: <0.01 NG/ML (ref 0–0.03)
WBC # BLD AUTO: 11.16 10*3/MM3 (ref 3.4–10.8)
WHOLE BLOOD HOLD SPECIMEN: NORMAL
WHOLE BLOOD HOLD SPECIMEN: NORMAL

## 2021-09-09 PROCEDURE — 85025 COMPLETE CBC W/AUTO DIFF WBC: CPT | Performed by: NURSE PRACTITIONER

## 2021-09-09 PROCEDURE — 99283 EMERGENCY DEPT VISIT LOW MDM: CPT

## 2021-09-09 PROCEDURE — 80053 COMPREHEN METABOLIC PANEL: CPT | Performed by: NURSE PRACTITIONER

## 2021-09-09 PROCEDURE — 93005 ELECTROCARDIOGRAM TRACING: CPT | Performed by: EMERGENCY MEDICINE

## 2021-09-09 PROCEDURE — 85730 THROMBOPLASTIN TIME PARTIAL: CPT | Performed by: NURSE PRACTITIONER

## 2021-09-09 PROCEDURE — 84484 ASSAY OF TROPONIN QUANT: CPT | Performed by: NURSE PRACTITIONER

## 2021-09-09 PROCEDURE — 83880 ASSAY OF NATRIURETIC PEPTIDE: CPT | Performed by: NURSE PRACTITIONER

## 2021-09-09 PROCEDURE — 86140 C-REACTIVE PROTEIN: CPT | Performed by: NURSE PRACTITIONER

## 2021-09-09 PROCEDURE — 85610 PROTHROMBIN TIME: CPT | Performed by: NURSE PRACTITIONER

## 2021-09-09 PROCEDURE — 85379 FIBRIN DEGRADATION QUANT: CPT | Performed by: EMERGENCY MEDICINE

## 2021-09-09 PROCEDURE — 71045 X-RAY EXAM CHEST 1 VIEW: CPT

## 2021-09-09 PROCEDURE — 71045 X-RAY EXAM CHEST 1 VIEW: CPT | Performed by: RADIOLOGY

## 2021-09-09 RX ORDER — KETOROLAC TROMETHAMINE 10 MG/1
10 TABLET, FILM COATED ORAL EVERY 6 HOURS PRN
Qty: 14 TABLET | Refills: 0 | Status: SHIPPED | OUTPATIENT
Start: 2021-09-09

## 2021-09-09 RX ORDER — KETOROLAC TROMETHAMINE 10 MG/1
20 TABLET, FILM COATED ORAL ONCE
Status: COMPLETED | OUTPATIENT
Start: 2021-09-09 | End: 2021-09-09

## 2021-09-09 RX ADMIN — KETOROLAC TROMETHAMINE 20 MG: 10 TABLET, FILM COATED ORAL at 22:40

## 2021-09-09 NOTE — ED NOTES
MEDICAL SCREENING:    Reason for Visit: Chest Pain    Patient initially seen in triage.  The patient was advised further evaluation and diagnostic testing will be needed, some of the treatment and testing will be initiated in the lobby in order to begin the process.  The patient will be returned to the waiting area for the time being and possibly be re-assessed by a subsequent ED provider.  The patient will be brought back to the treatment area in as timely manner as possible.         Nereyda Amaya, APRN  09/09/21 1543

## 2021-09-10 LAB
QT INTERVAL: 372 MS
QTC INTERVAL: 442 MS

## 2021-09-10 NOTE — ED PROVIDER NOTES
Subjective   Patient presents to ER with chest pain comes and goes      Chest Pain  Pain location:  Unable to specify  Pain quality: aching    Pain radiates to:  Does not radiate  Pain severity:  Mild  Onset quality:  Gradual  Timing:  Intermittent  Progression:  Waxing and waning  Chronicity:  Recurrent      Review of Systems   Constitutional: Positive for activity change.   HENT: Negative.    Eyes: Negative.    Respiratory: Negative.    Cardiovascular: Positive for chest pain.   Gastrointestinal: Negative.    Genitourinary: Negative.    Musculoskeletal: Negative.    Skin: Negative.    Allergic/Immunologic: Negative.    Neurological: Negative.    Psychiatric/Behavioral: Negative.        Past Medical History:   Diagnosis Date   • Chest pain    • COVID-19    • GERD (gastroesophageal reflux disease)    • Mood disorder (CMS/HCC)        Allergies   Allergen Reactions   • Penicillins    • Phenergan [Promethazine Hcl]    • Vistaril [Hydroxyzine Hcl]        Past Surgical History:   Procedure Laterality Date   • APPENDECTOMY     • CHOLECYSTECTOMY     • COLONOSCOPY N/A 9/26/2017    Procedure: COLONOSCOPY CPT CODE: 09836;  Surgeon: Clinton Bernal III, MD;  Location: [facility];  Service:    • ENDOSCOPY N/A 9/26/2017    Procedure: ESOPHAGOGASTRODUODENOSCOPY WITH DILATATION CPT CODE: 84397;  Surgeon: Clinton Bernal III, MD;  Location: [facility];  Service:    • KNEE SURGERY         Family History   Problem Relation Age of Onset   • No Known Problems Mother    • No Known Problems Father    • No Known Problems Sister    • No Known Problems Brother    • No Known Problems Son    • No Known Problems Daughter    • Heart disease Maternal Grandmother    • No Known Problems Maternal Grandfather    • No Known Problems Paternal Grandmother    • No Known Problems Paternal Grandfather    • No Known Problems Cousin    • Rheum arthritis Neg Hx    • Osteoarthritis Neg Hx    • Asthma Neg Hx    • Diabetes Neg Hx    • Heart failure Neg Hx     • Hyperlipidemia Neg Hx    • Hypertension Neg Hx    • Migraines Neg Hx    • Rashes / Skin problems Neg Hx    • Seizures Neg Hx    • Stroke Neg Hx    • Thyroid disease Neg Hx        Social History     Socioeconomic History   • Marital status:      Spouse name: Not on file   • Number of children: Not on file   • Years of education: Not on file   • Highest education level: Not on file   Tobacco Use   • Smoking status: Former Smoker     Packs/day: 1.00     Types: Cigarettes     Quit date: 2017     Years since quittin.4   • Smokeless tobacco: Never Used   Substance and Sexual Activity   • Alcohol use: No   • Drug use: No   • Sexual activity: Defer           Objective   Physical Exam  Vitals and nursing note reviewed.   Constitutional:       Appearance: He is well-developed.   HENT:      Head: Normocephalic.   Eyes:      Pupils: Pupils are equal, round, and reactive to light.   Cardiovascular:      Rate and Rhythm: Normal rate.      Heart sounds: Normal heart sounds.   Pulmonary:      Effort: Pulmonary effort is normal.      Breath sounds: Normal breath sounds.   Abdominal:      Palpations: Abdomen is soft.   Musculoskeletal:         General: Normal range of motion.      Cervical back: Normal range of motion.   Skin:     General: Skin is warm.      Capillary Refill: Capillary refill takes less than 2 seconds.   Neurological:      Mental Status: He is alert.         Procedures           ED Course  ED Course as of Sep 09 2245   Thu Sep 09, 2021   1511 ECG :15:09 NSR, rate 85. Normal ECG. QT/QTc 372/442.    [JAYSHREE]    ECG : NSR, nost -t abnormalities    [JAYSHREE]    CXR : negative      [JAYSHREE]      ED Course User Index  [JAYSHREE] Antony Quesada MD                                           [facility]    Final diagnoses:   Atypical chest pain       ED Disposition  ED Disposition     ED Disposition Condition Comment    Discharge Stable           Rubén Perdomo MD  [address] [facility]  [address]  [phone]    Schedule an appointment as soon as possible for a visit   If symptoms worsen         Medication List      New Prescriptions    ketorolac 10 MG tablet  Commonly known as: TORADOL  Take 1 tablet by mouth Every 6 (Six) Hours As Needed for Moderate Pain .           Where to Get Your Medications      These medications were sent to [facility] [place] [facility] [address] - [phone]  - [phone]   [address] [place] [address]    Phone: [phone]   · ketorolac 10 MG tablet          Antony Quesada MD  09/09/21 9209

## 2021-09-23 ENCOUNTER — HOSPITAL ENCOUNTER (OUTPATIENT)
Dept: HOSPITAL 79 - HEART CORB | Age: 36
End: 2021-09-23
Attending: INTERNAL MEDICINE
Payer: COMMERCIAL

## 2021-09-23 DIAGNOSIS — R07.2: Primary | ICD-10-CM

## 2023-05-25 ENCOUNTER — OFFICE VISIT (OUTPATIENT)
Dept: FAMILY MEDICINE CLINIC | Facility: CLINIC | Age: 38
End: 2023-05-25
Payer: COMMERCIAL

## 2023-05-25 ENCOUNTER — TELEPHONE (OUTPATIENT)
Dept: FAMILY MEDICINE CLINIC | Facility: CLINIC | Age: 38
End: 2023-05-25

## 2023-05-25 VITALS
WEIGHT: 304 LBS | BODY MASS INDEX: 41.17 KG/M2 | DIASTOLIC BLOOD PRESSURE: 84 MMHG | HEART RATE: 66 BPM | OXYGEN SATURATION: 98 % | TEMPERATURE: 98.6 F | SYSTOLIC BLOOD PRESSURE: 126 MMHG | HEIGHT: 72 IN | RESPIRATION RATE: 14 BRPM

## 2023-05-25 DIAGNOSIS — Z13.1 DIABETES MELLITUS SCREENING: ICD-10-CM

## 2023-05-25 DIAGNOSIS — R53.83 OTHER FATIGUE: ICD-10-CM

## 2023-05-25 DIAGNOSIS — K21.9 GASTROESOPHAGEAL REFLUX DISEASE WITHOUT ESOPHAGITIS: Primary | ICD-10-CM

## 2023-05-25 DIAGNOSIS — Z11.59 NEED FOR HEPATITIS C SCREENING TEST: ICD-10-CM

## 2023-05-25 DIAGNOSIS — Z13.29 THYROID DISORDER SCREENING: ICD-10-CM

## 2023-05-25 DIAGNOSIS — M54.9 MECHANICAL BACK PAIN: ICD-10-CM

## 2023-05-25 DIAGNOSIS — H65.01 NON-RECURRENT ACUTE SEROUS OTITIS MEDIA OF RIGHT EAR: ICD-10-CM

## 2023-05-25 DIAGNOSIS — Z13.21 ENCOUNTER FOR VITAMIN DEFICIENCY SCREENING: ICD-10-CM

## 2023-05-25 DIAGNOSIS — K76.0 FATTY LIVER: ICD-10-CM

## 2023-05-25 DIAGNOSIS — Z13.220 LIPID SCREENING: ICD-10-CM

## 2023-05-25 DIAGNOSIS — F41.9 ANXIETY: ICD-10-CM

## 2023-05-25 DIAGNOSIS — E66.01 CLASS 3 SEVERE OBESITY DUE TO EXCESS CALORIES WITHOUT SERIOUS COMORBIDITY WITH BODY MASS INDEX (BMI) OF 40.0 TO 44.9 IN ADULT: ICD-10-CM

## 2023-05-25 PROBLEM — F34.1 DYSTHYMIA: Status: ACTIVE | Noted: 2023-05-25

## 2023-05-25 PROCEDURE — 99214 OFFICE O/P EST MOD 30 MIN: CPT | Performed by: NURSE PRACTITIONER

## 2023-05-25 RX ORDER — BUSPIRONE HYDROCHLORIDE 10 MG/1
10 TABLET ORAL 3 TIMES DAILY
Qty: 90 TABLET | Refills: 2 | Status: SHIPPED | OUTPATIENT
Start: 2023-05-25

## 2023-05-25 RX ORDER — SEMAGLUTIDE 0.25 MG/.5ML
0.25 INJECTION, SOLUTION SUBCUTANEOUS WEEKLY
Qty: 2 ML | Refills: 0 | Status: SHIPPED | OUTPATIENT
Start: 2023-05-25 | End: 2023-06-16

## 2023-05-25 RX ORDER — AZITHROMYCIN 250 MG/1
TABLET, FILM COATED ORAL
Qty: 6 TABLET | Refills: 0 | Status: SHIPPED | OUTPATIENT
Start: 2023-05-25

## 2023-05-25 RX ORDER — PANTOPRAZOLE SODIUM 40 MG/1
40 TABLET, DELAYED RELEASE ORAL DAILY
Qty: 30 TABLET | Refills: 2 | Status: SHIPPED | OUTPATIENT
Start: 2023-05-25

## 2023-05-25 NOTE — PROGRESS NOTES
Subjective   Amos Griffin is a 38 y.o. male.   His wife, Neli, is present for today's visit.      Chief Complaint   Patient presents with   • Establish Care       History of Present Illness     Establish Care-here to establish Care.  Has been under the care of a provider at [facility].    Anxiety-on Prozac 40 mg.  Has recently had an increase but does not feel that meds are effective. He has been on Prozac for 6-7 years approx.  He reports he feels he is always in a bad mood.  Is easily irritated. Sleep quality is poor.  He has not been on any other medications.  He reports a past history of SI but was before going on any meds.  More than 7-10 years ago.    GERD-ongoing.  On Protonix 40 mg.   He has had a EDG and swallow study in 2017.  He reports medication does take care of his symptoms.    Weight Loss-currently at 304.  He reports that he has been up to 317 in the past.  He is interested in weight loss medications.  No recent labs.   He has been trying to eat smaller portions.  As a family, his spouse as cut down on the amount of sweets she has been buying.     The following portions of the patient's history were reviewed and updated as appropriate: CC, ROS, allergies, current medications, past family history, past medical history, past social history, past surgical history and problem list.      Review of Systems   Constitutional: Positive for fatigue. Negative for appetite change, unexpected weight gain and unexpected weight loss.   HENT: Positive for tinnitus (right for approx 1 week). Negative for congestion, ear pain, postnasal drip, rhinorrhea, sore throat, swollen glands, trouble swallowing and voice change.    Eyes: Negative for blurred vision, double vision, pain and visual disturbance.   Respiratory: Negative for cough, chest tightness, shortness of breath (only with anxiety) and wheezing.    Cardiovascular: Negative for chest pain, palpitations (only with anxiety) and leg swelling.  "  Gastrointestinal: Positive for diarrhea (6-8 times per day, usually after meals). Negative for abdominal pain, blood in stool, constipation, nausea and indigestion.        History of internal hemorrhoids   Genitourinary: Negative for dysuria, hematuria and urgency.   Musculoskeletal: Positive for arthralgias (left-reports a past injury) and back pain. Negative for gait problem, joint swelling and myalgias.   Skin: Negative for color change and skin lesions.   Allergic/Immunologic: Negative.    Neurological: Negative for numbness, headache and confusion.   Hematological: Negative.    Psychiatric/Behavioral: Positive for agitation (at times), sleep disturbance and stress. Negative for decreased concentration, dysphoric mood and suicidal ideas. The patient is nervous/anxious.    All other systems reviewed and are negative.      Objective     /84   Pulse 66   Temp 98.6 °F (37 °C)   Resp 14   Ht 182.9 cm (72\")   Wt (!) 138 kg (304 lb)   SpO2 98%   BMI 41.23 kg/m²     Physical Exam  Vitals reviewed.   Constitutional:       General: He is not in acute distress.     Appearance: He is well-developed. He is obese. He is not diaphoretic.   HENT:      Head: Normocephalic and atraumatic.      Jaw: No tenderness.      Right Ear: Hearing, tympanic membrane, ear canal and external ear normal.      Left Ear: Hearing, tympanic membrane, ear canal and external ear normal.      Nose: Nose normal. No nasal tenderness or congestion.      Right Sinus: No maxillary sinus tenderness or frontal sinus tenderness.      Left Sinus: No maxillary sinus tenderness or frontal sinus tenderness.      Mouth/Throat:      Lips: Pink.      Mouth: Mucous membranes are moist.      Pharynx: Oropharynx is clear. Uvula midline.   Eyes:      General: Lids are normal. No scleral icterus.     Extraocular Movements:      Right eye: Normal extraocular motion and no nystagmus.      Left eye: Normal extraocular motion and no nystagmus.      " Conjunctiva/sclera: Conjunctivae normal.      Pupils: Pupils are equal, round, and reactive to light.   Neck:      Thyroid: No thyromegaly or thyroid tenderness.      Vascular: No carotid bruit or JVD.      Trachea: No tracheal tenderness.   Cardiovascular:      Rate and Rhythm: Normal rate and regular rhythm.      Pulses:           Dorsalis pedis pulses are 2+ on the right side and 2+ on the left side.        Posterior tibial pulses are 2+ on the right side and 2+ on the left side.      Heart sounds: Normal heart sounds, S1 normal and S2 normal. No murmur heard.  Pulmonary:      Effort: Pulmonary effort is normal. No accessory muscle usage, prolonged expiration or respiratory distress.      Breath sounds: Normal breath sounds.   Chest:      Chest wall: No tenderness.   Abdominal:      General: Bowel sounds are normal. There is no distension.      Palpations: Abdomen is soft. There is no hepatomegaly, splenomegaly or mass.      Tenderness: There is no abdominal tenderness.   Musculoskeletal:         General: Tenderness present.      Cervical back: Normal range of motion and neck supple.      Lumbar back: Tenderness present. Decreased range of motion.      Right lower leg: No edema.      Left lower leg: No edema.      Comments: No muscular atrophy or flaccidity.   Lymphadenopathy:      Head:      Right side of head: No submental or submandibular adenopathy.      Left side of head: No submental or submandibular adenopathy.      Cervical: No cervical adenopathy.      Right cervical: No superficial cervical adenopathy.     Left cervical: No superficial cervical adenopathy.   Skin:     General: Skin is warm and dry.      Capillary Refill: Capillary refill takes less than 2 seconds.      Coloration: Skin is not jaundiced or pale.      Findings: No erythema.      Nails: There is no clubbing.   Neurological:      Mental Status: He is alert and oriented to person, place, and time.      Cranial Nerves: No cranial nerve  deficit or facial asymmetry.      Sensory: No sensory deficit.      Motor: No weakness, tremor, atrophy or abnormal muscle tone.      Coordination: Coordination normal.      Deep Tendon Reflexes: Reflexes are normal and symmetric.   Psychiatric:         Attention and Perception: He is attentive.         Mood and Affect: Mood normal. Mood is not anxious or depressed.         Speech: Speech normal.         Behavior: Behavior normal. Behavior is cooperative.         Thought Content: Thought content normal.         Cognition and Memory: Cognition normal.         Judgment: Judgment normal.           Diagnoses and all orders for this visit:    1. Gastroesophageal reflux disease without esophagitis (Primary)  Comments:  continue Protonix 40 mg.   avoid food triggers  Orders:  -     pantoprazole (PROTONIX) 40 MG EC tablet; Take 1 tablet by mouth Daily.  Dispense: 30 tablet; Refill: 2    2. Fatty liver  Assessment & Plan:  Will plan for updated LFT    Orders:  -     Comprehensive Metabolic Panel    3. Anxiety  Comments:  continue Prozac,  will add BuSpar.  continue to work on stress reduction  Orders:  -     busPIRone (BUSPAR) 10 MG tablet; Take 1 tablet by mouth 3 (Three) Times a Day.  Dispense: 90 tablet; Refill: 2    4. Diabetes mellitus screening  -     Hemoglobin A1c    5. Mechanical back pain  Comments:  imaging ordered   Orders:  -     XR Spine Lumbar 2 or 3 View    6. Other fatigue  -     CBC & Differential  -     Comprehensive Metabolic Panel  -     Hemoglobin A1c  -     Hepatitis C Antibody  -     Lipid Panel  -     T4, Free  -     TSH  -     Vitamin B12  -     Vitamin D,25-Hydroxy    7. Thyroid disorder screening  -     T4, Free  -     TSH    8. Encounter for vitamin deficiency screening  -     Vitamin B12  -     Vitamin D,25-Hydroxy    9. Lipid screening  -     Lipid Panel    10. Non-recurrent acute serous otitis media of right ear  -     azithromycin (Zithromax Z-Gunnar) 250 MG tablet; Take 2 tablets by mouth on  day 1, then 1 tablet daily on days 2-5  Dispense: 6 tablet; Refill: 0    11. Need for hepatitis C screening test  -     Hepatitis C Antibody    12. Class 3 severe obesity due to excess calories without serious comorbidity with body mass index (BMI) of 40.0 to 44.9 in adult  -     Semaglutide-Weight Management (Wegovy) 0.25 MG/0.5ML solution auto-injector; Inject 0.25 mg under the skin into the appropriate area as directed 1 (One) Time Per Week for 4 doses.  Dispense: 2 mL; Refill: 0       Class 3 Severe Obesity (BMI >=40). Obesity-related health conditions include the following: GERD. Obesity is newly identified. BMI is is above average. We discussed portion control and increasing exercise.     Understands disease processes and need for medications.  Understands reasons for urgent and emergent care.  Patient (& family) verbalized agreement for treatment plan.   Emotional support and active listening provided.  Patient provided time to verbalize feelings.    Updated labs ordered.      RTC 2-3 weeks for re-eval, sooner for worsening of symptoms.             This document has been electronically signed by:  KARI Sanchez, FNP-C    Dragon disclaimer:  Part of this note may be an electronic transcription/translation of spoken language to printed text using the Dragon Dictation System.

## 2023-05-25 NOTE — LETTER
May 25, 2023     Patient: Amos Griffin   YOB: 1985   Date of Visit: 5/25/2023       To Whom It May Concern:    It is my medical opinion that Amos Griffin may return to work in one day.   Please excuse for this morning due to an appt.       Sincerely,          KARI Sanchez

## 2023-06-05 ENCOUNTER — PATIENT ROUNDING (BHMG ONLY) (OUTPATIENT)
Dept: FAMILY MEDICINE CLINIC | Facility: CLINIC | Age: 38
End: 2023-06-05
Payer: COMMERCIAL

## 2023-06-05 NOTE — PROGRESS NOTES
June 5, 2023    Hello, may I speak with Amos Griffin?    My name is Naila Seay      I am  with [facility]  [address] [place] [id number] [phone].    Before we get started may I verify your date of birth? 1985    I am calling to officially welcome you to our practice and ask about your recent visit. Is this a good time to talk? yes    Tell me about your visit with us. What things went well?  Good Visit       We're always looking for ways to make our patients' experiences even better. Do you have recommendations on ways we may improve?  no    Overall were you satisfied with your first visit to our practice? yes       I appreciate you taking the time to speak with me today. Is there anything else I can do for you? no      Thank you, and have a great day.

## 2023-07-27 RX ORDER — SEMAGLUTIDE 0.25 MG/.5ML
0.5 INJECTION, SOLUTION SUBCUTANEOUS WEEKLY
Qty: 4 ML | Refills: 0 | Status: SHIPPED | OUTPATIENT
Start: 2023-07-27

## 2023-07-27 NOTE — TELEPHONE ENCOUNTER
Caller: Neli Griffin    Relationship: Emergency Contact    Best call back number: [phone]     Requested Prescriptions:   Requested Prescriptions     Pending Prescriptions Disp Refills    Semaglutide-Weight Management (Wegovy) 0.25 MG/0.5ML solution auto-injector 4 mL 0     Sig: Inject 0.25 mg under the skin 1 time per week for 4 doses.        Pharmacy where request should be sent: [facility]  [place] - [address] - [phone]  - [phone] FX     Last office visit with prescribing clinician: 5/25/2023   Last telemedicine visit with prescribing clinician: Visit date not found   Next office visit with prescribing clinician: Visit date not found     Additional details provided by patient: PATIENT NEEDS REFILL, ALSO WANTS TO GO TO NEXT HIGHER DOSE    Does the patient have less than a 3 day supply:  [x] Yes  [] No    Would you like a call back once the refill request has been completed: [] Yes [] No    If the office needs to give you a call back, can they leave a voicemail: [] Yes [] No    Milli Hendricks Rep   07/27/23 10:00 EDT

## 2023-11-21 ENCOUNTER — TELEPHONE (OUTPATIENT)
Dept: FAMILY MEDICINE CLINIC | Facility: CLINIC | Age: 38
End: 2023-11-21
Payer: COMMERCIAL

## 2023-11-21 NOTE — TELEPHONE ENCOUNTER
I think I answered this in a staff message but he needs a new appt.  He has not been seen since May.

## 2023-11-21 NOTE — TELEPHONE ENCOUNTER
Pts wife is aware. She is going to call back to get the patient a apt.      ----- Message from KARI Sanchez sent at 11/21/2023  1:34 PM EST -----  He is going to need an updated appt either with me or see Mental Health   ----- Message -----  From: Adriane Perez MA  Sent: 11/20/2023  11:20 AM EST  To: KARI Sanchez    Patient's wife called and stated that the patient's depression has got worse the last month or so. He is taking the prozac 40mg one at night and the buspar 10mg one at night. She wanted to know if this could be either increased or if he could take two prozac a day? Or if this could be changed to something different all together.

## 2023-11-21 NOTE — TELEPHONE ENCOUNTER
Caller: Neli Griffin    Relationship: Emergency Contact    Best call back number: [phone]     What is the best time to reach you: ANY    Who are you requesting to speak with (clinical staff, provider,  specific staff member):     Do you know the name of the person who called: WIFE    What was the call regarding:  PROZAC.  CAN WE INCREASE DOSAGE OR SEND A DIFFERENT PRESCRIPTION?  POSSIBLY A MORNING DOSAGE.  SHE SAID SHE LEFT A MESSAGE YESTERDAY AS WELL.     Is it okay if the provider responds through MyChart: PHONE CALL PLEASE

## 2023-11-28 ENCOUNTER — OFFICE VISIT (OUTPATIENT)
Dept: FAMILY MEDICINE CLINIC | Facility: CLINIC | Age: 38
End: 2023-11-28
Payer: COMMERCIAL

## 2023-11-28 VITALS
SYSTOLIC BLOOD PRESSURE: 130 MMHG | OXYGEN SATURATION: 97 % | WEIGHT: 304 LBS | RESPIRATION RATE: 14 BRPM | DIASTOLIC BLOOD PRESSURE: 86 MMHG | HEART RATE: 70 BPM | HEIGHT: 72 IN | BODY MASS INDEX: 41.17 KG/M2 | TEMPERATURE: 98.4 F

## 2023-11-28 DIAGNOSIS — F33.8 SEASONAL AFFECTIVE DISORDER: Primary | ICD-10-CM

## 2023-11-28 DIAGNOSIS — F41.8 DEPRESSION WITH ANXIETY: ICD-10-CM

## 2023-11-28 DIAGNOSIS — K21.9 GASTROESOPHAGEAL REFLUX DISEASE WITHOUT ESOPHAGITIS: ICD-10-CM

## 2023-11-28 PROCEDURE — 99213 OFFICE O/P EST LOW 20 MIN: CPT | Performed by: NURSE PRACTITIONER

## 2023-11-28 RX ORDER — BREXPIPRAZOLE 1 MG/1
1 TABLET ORAL DAILY
Qty: 14 TABLET | Refills: 0 | COMMUNITY
Start: 2023-11-28

## 2023-11-28 RX ORDER — FLUOXETINE HYDROCHLORIDE 40 MG/1
40 CAPSULE ORAL NIGHTLY
Qty: 30 CAPSULE | Refills: 5 | Status: SHIPPED | OUTPATIENT
Start: 2023-11-28

## 2023-11-28 RX ORDER — PANTOPRAZOLE SODIUM 40 MG/1
40 TABLET, DELAYED RELEASE ORAL DAILY
Qty: 30 TABLET | Refills: 5 | Status: SHIPPED | OUTPATIENT
Start: 2023-11-28

## 2023-11-28 RX ORDER — BUSPIRONE HYDROCHLORIDE 15 MG/1
15 TABLET ORAL 3 TIMES DAILY
Qty: 90 TABLET | Refills: 2 | Status: SHIPPED | OUTPATIENT
Start: 2023-11-28

## 2023-11-28 NOTE — ASSESSMENT & PLAN NOTE
Discussed treatment options with patient including increasing his Prozac to 60 mg or continuing current dose and adding Abilify versus Rexulti for mood stabilization and possible seasonal affective disorder.  Patient and his spouse elect to try mood stabilization first.  We will increase BuSpar to 15 mg 3 times daily as needed for anxiety symptoms.

## 2023-11-28 NOTE — PROGRESS NOTES
Subjective   Amos Griffin is a 38 y.o. male.     Chief Complaint   Patient presents with    Med Refill       History of Present Illness     Medication refills-patient presents for medication refills.  Depression-patient reported via [email or web address] message that his depressive symptoms had increased.  He is currently on Prozac 40 mg and BuSpar 10 mg 3 times daily.  He has felt more down and irritable.  He does feel symptoms have increased since onset of shorter days and longer times of darkness.  He reports that he is sleeping ok.  No concentration decrease.  His spouse reports some untreated sleep apnea.  Questions if this is contributing.    GERD-ongoing.  Patient is currently on Protonix 40 mg daily.  No negative side effects.  He is out of meds.  He has had some increased gastric irritation.  He denies diarrhea more than his usual.        The following portions of the patient's history were reviewed and updated as appropriate: CC, ROS, allergies, current medications, past family history, past medical history, past social history, past surgical history and problem list.    Review of Systems   Constitutional:  Positive for fatigue. Negative for appetite change, unexpected weight gain and unexpected weight loss.   HENT:  Negative for congestion, ear pain, postnasal drip, rhinorrhea, sore throat, swollen glands, trouble swallowing and voice change.    Eyes:  Negative for pain and visual disturbance.   Respiratory:  Negative for cough, chest tightness, shortness of breath and wheezing.    Cardiovascular:  Negative for chest pain, palpitations and leg swelling.   Gastrointestinal:  Negative for abdominal pain, blood in stool, constipation, diarrhea, nausea and indigestion.   Genitourinary:  Negative for dysuria, hematuria and urgency.   Musculoskeletal:  Positive for arthralgias and myalgias. Negative for back pain, gait problem and joint swelling.   Skin:  Negative for color change and skin lesions.  "  Allergic/Immunologic: Negative.    Neurological:  Negative for dizziness, numbness and headache.   Hematological: Negative.    Psychiatric/Behavioral:  Positive for stress. Negative for dysphoric mood, sleep disturbance and suicidal ideas. The patient is nervous/anxious.    All other systems reviewed and are negative.      Objective     /86   Pulse 70   Temp 98.4 °F (36.9 °C)   Resp 14   Ht 182.9 cm (72.01\")   Wt (!) 138 kg (304 lb)   SpO2 97%   BMI 41.22 kg/m²     Physical Exam    Diagnoses and all orders for this visit:    1. Seasonal affective disorder (Primary)  -     Brexpiprazole (Rexulti) 1 MG tablet; Take 1 mg by mouth Daily. LOT [id number] EXP 02/2025  Dispense: 14 tablet; Refill: 0  -     Brexpiprazole 0.25 MG tablet; Take 1 tablet by mouth Daily. Starter pack-1 box-14 tabs lot [id number] EXP 02/2024  Dispense: 14 tablet; Refill: 0  -     FLUoxetine (PROzac) 40 MG capsule; Take 1 capsule by mouth Every Night.  Dispense: 30 capsule; Refill: 5  -     busPIRone (BUSPAR) 15 MG tablet; Take 1 tablet by mouth 3 (Three) Times a Day.  Dispense: 90 tablet; Refill: 2    2. Depression with anxiety  Assessment & Plan:  Discussed treatment options with patient including increasing his Prozac to 60 mg or continuing current dose and adding Abilify versus Rexulti for mood stabilization and possible seasonal affective disorder.  Patient and his spouse elect to try mood stabilization first.  We will increase BuSpar to 15 mg 3 times daily as needed for anxiety symptoms.    Orders:  -     Brexpiprazole (Rexulti) 1 MG tablet; Take 1 mg by mouth Daily. LOT [id number] EXP 02/2025  Dispense: 14 tablet; Refill: 0  -     Brexpiprazole 0.25 MG tablet; Take 1 tablet by mouth Daily. Starter pack-1 box-14 tabs lot [id number] EXP 02/2024  Dispense: 14 tablet; Refill: 0  -     FLUoxetine (PROzac) 40 MG capsule; Take 1 capsule by mouth Every Night.  Dispense: 30 capsule; Refill: 5  -     busPIRone (BUSPAR) 15 MG tablet; Take 1 " tablet by mouth 3 (Three) Times a Day.  Dispense: 90 tablet; Refill: 2    3. Gastroesophageal reflux disease without esophagitis  Comments:  continue Protonix 40 mg.   avoid food triggers  Assessment & Plan:  Resume Protonix.    Report any new or unimproved GI concerns    Orders:  -     pantoprazole (PROTONIX) 40 MG EC tablet; Take 1 tablet by mouth Daily.  Dispense: 30 tablet; Refill: 5       Understands disease processes and need for medications.  Understands reasons for urgent and emergent care.  Patient (& family) verbalized agreement for treatment plan.   Emotional support and active listening provided.  Patient provided time to verbalize feelings.    Discussed sleep apnea.  Patient declines to have a study at this time.   RTC 1 month, sooner if needed.             This document has been electronically signed by:  KARI Sanchez FNP-C Dragon disclaimer:  Part of this note may be an electronic transcription/translation of spoken language to printed text using the Dragon Dictation System.

## 2023-11-28 NOTE — LETTER
November 28, 2023     Patient: Amos Griffin   YOB: 1985   Date of Visit: 11/28/2023       To Whom It May Concern:    It is my medical opinion that Amos Griffin may return to work today.  Please excuse for this morning due to an appt.      Sincerely,          KARI Sanchez    CC: No Recipients

## 2023-11-29 ENCOUNTER — TELEPHONE (OUTPATIENT)
Dept: FAMILY MEDICINE CLINIC | Facility: CLINIC | Age: 38
End: 2023-11-29
Payer: COMMERCIAL

## 2023-11-29 RX ORDER — FLUOXETINE HYDROCHLORIDE 20 MG/1
20 CAPSULE ORAL DAILY
Qty: 30 CAPSULE | Refills: 1 | Status: SHIPPED | OUTPATIENT
Start: 2023-11-29

## 2023-11-29 NOTE — TELEPHONE ENCOUNTER
Pt is aware of this information.       ----- Message from KARI Sanchez sent at 11/29/2023  9:06 AM EST -----  The rexaulti wont work immediately.  That can take a few weeks but yes, I can increase the prozac.       ----- Message -----  From: Adriane Perez MA  Sent: 11/29/2023   8:54 AM EST  To: KARI Sanchez called back and said the rexulti did not help. It put him in a worse state of mind than he was before. She said you all had mentioned adding another prozac in. She wanted to know what you wanted to do?

## 2024-04-23 ENCOUNTER — TELEMEDICINE (OUTPATIENT)
Dept: FAMILY MEDICINE CLINIC | Facility: CLINIC | Age: 39
End: 2024-04-23
Payer: COMMERCIAL

## 2024-04-23 VITALS — BODY MASS INDEX: 41.17 KG/M2 | WEIGHT: 304 LBS | HEIGHT: 72 IN

## 2024-04-23 DIAGNOSIS — R35.1 BPH ASSOCIATED WITH NOCTURIA: ICD-10-CM

## 2024-04-23 DIAGNOSIS — E66.01 MORBID (SEVERE) OBESITY DUE TO EXCESS CALORIES: Primary | ICD-10-CM

## 2024-04-23 DIAGNOSIS — N40.1 BPH ASSOCIATED WITH NOCTURIA: ICD-10-CM

## 2024-04-23 DIAGNOSIS — F41.8 DEPRESSION WITH ANXIETY: ICD-10-CM

## 2024-04-23 DIAGNOSIS — R53.83 OTHER FATIGUE: ICD-10-CM

## 2024-04-23 RX ORDER — SEMAGLUTIDE 0.25 MG/.5ML
0.25 INJECTION, SOLUTION SUBCUTANEOUS WEEKLY
Qty: 2 ML | Refills: 0 | Status: SHIPPED | OUTPATIENT
Start: 2024-04-23 | End: 2024-05-15

## 2024-04-23 NOTE — PROGRESS NOTES
You have chosen to receive care through a telehealth visit.  Do you consent to use a video/audio connection for your medical care today? Yes    Patient has chosen to have a telehealth/video visit due to current pandemic/current public health emergency.   This visit is live, real time audio and visual communication between the provider and the patient.      I did complete this video visit with the patient using [employer].    Patient visit is for the following CC:     Chief Complaint   Patient presents with    Fatigue     Brief HPI/ROS obtained as follows:    Fatigue-is concerned that his Testosterone is low due to fatigue.  He feels he is tired despite sleeping well.   He  is on Prozac 40 in the AM and 20 in the PM.  He has noted decreased libido.    He has not noted changes in urination.    Obesity-ongoing.  He has been on Wegovy but stopped when a shortage in medication occurred.  He has been off meds a few months.  He would like to resume so that he may pursue weight loss again.  Labs-would like to have sent thru [facility].  He would like to have routine labs as well.  He is interested in a testosterone level.  He has noted some decreased libido. Also some difficulty with erection.  He would be interested in a testosterone level as well as possible treatment for ED. he occasionally has urinary frequency at night.  No pain with urination.  No hematuria.    The following portions of the patient's history were reviewed and updated as appropriate: CC, ROS, allergies, current medications, past family history, past medical history, past social history, past surgical history and problem list.    Review of Systems   Constitutional:  Positive for fatigue. Negative for appetite change and unexpected weight change.   HENT:  Negative for congestion, ear pain, nosebleeds, postnasal drip, rhinorrhea, sore throat, trouble swallowing and voice change.    Eyes:  Negative for photophobia, pain and visual disturbance.    Respiratory:  Negative for cough, chest tightness, shortness of breath and wheezing.    Cardiovascular:  Negative for chest pain and palpitations.   Gastrointestinal:  Negative for abdominal pain, blood in stool, constipation, diarrhea, nausea and vomiting.   Endocrine: Negative for cold intolerance and polydipsia.   Genitourinary:  Negative for difficulty urinating, flank pain, frequency and hematuria.        Decreased libido & ED   Musculoskeletal:  Positive for arthralgias and myalgias. Negative for back pain, gait problem and joint swelling.   Skin:  Negative for color change and rash.   Allergic/Immunologic: Negative.    Neurological:  Negative for dizziness, syncope, numbness and headaches.   Hematological: Negative.    Psychiatric/Behavioral:  Positive for dysphoric mood. Negative for sleep disturbance and suicidal ideas. The patient is nervous/anxious.    All other systems reviewed and are negative.      Assessment     Physical Exam   Constitutional: He appears well-developed and well-nourished. No distress.   HENT:   Head: Normocephalic.   Right Ear: External ear normal.   Left Ear: External ear normal.   Nose: Nose normal.   Mouth/Throat: Oropharynx is clear and moist.   Eyes: Pupils are equal, round, and reactive to light. Conjunctivae and EOM are normal. No scleral icterus.   Neck: Neck normal appearance.No JVD present. No thyromegaly present.   Pulmonary/Chest: Effort normal.  No respiratory distress. He no audible wheeze...  Abdominal: Abdomen appears normal. Soft. He exhibits no distension and no visible mass.   Musculoskeletal: Normal range of motion.   Neurological: He is alert. No cranial nerve deficit. Coordination normal.   Skin: Skin is warm and dry. Capillary refill takes less than 2 seconds. He is not diaphoretic. No nail bed cyanosis or erythema. No pallor. Nails show no clubbing.   Psychiatric: His speech is normal and behavior is normal. He mood appears anxious. He does not exhibit a  depressed mood. He is attentive.       Diagnoses and all orders for this visit:    1. Morbid (severe) obesity due to excess calories (Primary)  Comments:  We will resume Wegovy.  Patient to continue to work on diet changes including increasing his protein 80 to 100 g/day  Orders:  -     Semaglutide-Weight Management (Wegovy) 0.25 MG/0.5ML solution auto-injector; Inject 0.5 mL under the skin into the appropriate area as directed 1 (One) Time Per Week for 4 doses.  Dispense: 2 mL; Refill: 0    2. Depression with anxiety  Comments:  Continue Prozac and as needed BuSpar  Assessment & Plan:  Continue Prozac  60 mg   Continue BuSpar to 15 mg 3 times daily as needed for anxiety symptoms.      3. Other fatigue  -     Testosterone, Free, Total; Future    4. BPH associated with nocturia  Comments:  Discussed if testosterone is low he may supplement with testosterone supplement or he may obtain DHEA OTC and take up to 100 mg  Orders:  -     tadalafil (Cialis) 5 MG tablet; Take 1 tablet by mouth Daily As Needed (BPH).  Dispense: 90 tablet; Refill: 2      Patient instructed and advised to call if symptoms are increasing or new symptoms occur.    Understands reasons for urgent and emergent care.  Patient (& family) verbalized agreement for treatment plan.     Labs ordered.  Patient would like to obtain outpatient    RTC PRN 3-5 days for worsening or non resolving symptoms    Will consider ED treatment if PSA WNL.    Discussed Testosterone treatment if needed.      RTC 3 months, sooner if needed.       This is an audio and video enabled enabled telehealth encounter.      This visit was conducted with the provider in the office at [facility] and the patient in their home in a private area.    This visit/video call lasted:  15 minutes       This document has been electronically signed by:  KAIR Sanchez, FNP-C    Dragon disclaimer:  Part of this note may be an electronic transcription/translation of spoken  language to printed text using the Dragon Dictation System.

## 2024-05-01 LAB
25(OH)D3+25(OH)D2 SERPL-MCNC: 19.3 NG/ML (ref 30–100)
ALBUMIN SERPL-MCNC: 4.3 G/DL (ref 3.5–5.2)
ALBUMIN/GLOB SERPL: 1.5 G/DL
ALP SERPL-CCNC: 113 U/L (ref 39–117)
ALT SERPL-CCNC: 36 U/L (ref 1–41)
AST SERPL-CCNC: 24 U/L (ref 1–40)
BASOPHILS # BLD AUTO: 0.09 10*3/MM3 (ref 0–0.2)
BASOPHILS NFR BLD AUTO: 0.8 % (ref 0–1.5)
BILIRUB SERPL-MCNC: 0.5 MG/DL (ref 0–1.2)
BUN SERPL-MCNC: 12 MG/DL (ref 6–20)
BUN/CREAT SERPL: 13.5 (ref 7–25)
CALCIUM SERPL-MCNC: 9.3 MG/DL (ref 8.6–10.5)
CHLORIDE SERPL-SCNC: 102 MMOL/L (ref 98–107)
CHOLEST SERPL-MCNC: 169 MG/DL (ref 0–200)
CO2 SERPL-SCNC: 29.5 MMOL/L (ref 22–29)
CREAT SERPL-MCNC: 0.89 MG/DL (ref 0.76–1.27)
EGFRCR SERPLBLD CKD-EPI 2021: 112.5 ML/MIN/1.73
EOSINOPHIL # BLD AUTO: 0.16 10*3/MM3 (ref 0–0.4)
EOSINOPHIL NFR BLD AUTO: 1.5 % (ref 0.3–6.2)
ERYTHROCYTE [DISTWIDTH] IN BLOOD BY AUTOMATED COUNT: 12.5 % (ref 12.3–15.4)
GLOBULIN SER CALC-MCNC: 2.9 GM/DL
GLUCOSE SERPL-MCNC: 128 MG/DL (ref 65–99)
HBA1C MFR BLD: 5.8 % (ref 4.8–5.6)
HCT VFR BLD AUTO: 47.2 % (ref 37.5–51)
HDLC SERPL-MCNC: 35 MG/DL (ref 40–60)
HGB BLD-MCNC: 16.1 G/DL (ref 13–17.7)
IMM GRANULOCYTES # BLD AUTO: 0.06 10*3/MM3 (ref 0–0.05)
IMM GRANULOCYTES NFR BLD AUTO: 0.5 % (ref 0–0.5)
LDLC SERPL CALC-MCNC: 89 MG/DL (ref 0–100)
LYMPHOCYTES # BLD AUTO: 2.85 10*3/MM3 (ref 0.7–3.1)
LYMPHOCYTES NFR BLD AUTO: 26 % (ref 19.6–45.3)
MCH RBC QN AUTO: 30.8 PG (ref 26.6–33)
MCHC RBC AUTO-ENTMCNC: 34.1 G/DL (ref 31.5–35.7)
MCV RBC AUTO: 90.4 FL (ref 79–97)
MONOCYTES # BLD AUTO: 0.99 10*3/MM3 (ref 0.1–0.9)
MONOCYTES NFR BLD AUTO: 9 % (ref 5–12)
NEUTROPHILS # BLD AUTO: 6.8 10*3/MM3 (ref 1.7–7)
NEUTROPHILS NFR BLD AUTO: 62.2 % (ref 42.7–76)
NRBC BLD AUTO-RTO: 0 /100 WBC (ref 0–0.2)
PLATELET # BLD AUTO: 252 10*3/MM3 (ref 140–450)
POTASSIUM SERPL-SCNC: 3.8 MMOL/L (ref 3.5–5.2)
PROT SERPL-MCNC: 7.2 G/DL (ref 6–8.5)
PSA SERPL-MCNC: 0.39 NG/ML (ref 0–4)
RBC # BLD AUTO: 5.22 10*6/MM3 (ref 4.14–5.8)
SODIUM SERPL-SCNC: 140 MMOL/L (ref 136–145)
T4 FREE SERPL-MCNC: 1.1 NG/DL (ref 0.93–1.7)
TESTOST FREE SERPL-MCNC: 6.1 PG/ML (ref 8.7–25.1)
TESTOST SERPL-MCNC: 332 NG/DL (ref 264–916)
TRIGL SERPL-MCNC: 267 MG/DL (ref 0–150)
TSH SERPL DL<=0.005 MIU/L-ACNC: 1.41 UIU/ML (ref 0.27–4.2)
VIT B12 SERPL-MCNC: 465 PG/ML (ref 211–946)
VLDLC SERPL CALC-MCNC: 45 MG/DL (ref 5–40)
WBC # BLD AUTO: 10.95 10*3/MM3 (ref 3.4–10.8)

## 2024-05-05 RX ORDER — TADALAFIL 5 MG/1
5 TABLET ORAL DAILY PRN
Qty: 90 TABLET | Refills: 2 | Status: SHIPPED | OUTPATIENT
Start: 2024-05-05

## 2024-05-06 ENCOUNTER — TELEPHONE (OUTPATIENT)
Dept: FAMILY MEDICINE CLINIC | Facility: CLINIC | Age: 39
End: 2024-05-06
Payer: COMMERCIAL

## 2024-07-13 ENCOUNTER — APPOINTMENT (OUTPATIENT)
Dept: GENERAL RADIOLOGY | Facility: HOSPITAL | Age: 39
End: 2024-07-13
Payer: COMMERCIAL

## 2024-07-13 ENCOUNTER — HOSPITAL ENCOUNTER (EMERGENCY)
Facility: HOSPITAL | Age: 39
Discharge: PSYCHIATRIC OR SUBSTANCE USE FACILITY | End: 2024-07-13
Attending: STUDENT IN AN ORGANIZED HEALTH CARE EDUCATION/TRAINING PROGRAM
Payer: COMMERCIAL

## 2024-07-13 ENCOUNTER — HOSPITAL ENCOUNTER (INPATIENT)
Facility: HOSPITAL | Age: 39
LOS: 2 days | Discharge: HOME | End: 2024-07-15
Attending: PSYCHIATRY & NEUROLOGY | Admitting: PSYCHIATRY & NEUROLOGY
Payer: COMMERCIAL

## 2024-07-13 VITALS
SYSTOLIC BLOOD PRESSURE: 147 MMHG | RESPIRATION RATE: 18 BRPM | DIASTOLIC BLOOD PRESSURE: 94 MMHG | HEIGHT: 72 IN | TEMPERATURE: 97.6 F | BODY MASS INDEX: 41.21 KG/M2 | OXYGEN SATURATION: 100 % | WEIGHT: 304.24 LBS | HEART RATE: 75 BPM

## 2024-07-13 DIAGNOSIS — Z00.8 EVALUATION BY PSYCHIATRIC SERVICE REQUIRED: Primary | ICD-10-CM

## 2024-07-13 DIAGNOSIS — R45.89 SUICIDAL BEHAVIOR WITHOUT ATTEMPTED SELF-INJURY: ICD-10-CM

## 2024-07-13 PROBLEM — F32.A DEPRESSION WITH SUICIDAL IDEATION: Status: ACTIVE | Noted: 2024-07-13

## 2024-07-13 PROBLEM — R45.851 DEPRESSION WITH SUICIDAL IDEATION: Status: ACTIVE | Noted: 2024-07-13

## 2024-07-13 LAB
ALBUMIN SERPL-MCNC: 4.1 G/DL (ref 3.5–5.2)
ALBUMIN/GLOB SERPL: 1.3 G/DL
ALP SERPL-CCNC: 97 U/L (ref 39–117)
ALT SERPL W P-5'-P-CCNC: 47 U/L (ref 1–41)
AMPHET+METHAMPHET UR QL: NEGATIVE
AMPHETAMINES UR QL: NEGATIVE
ANION GAP SERPL CALCULATED.3IONS-SCNC: 14 MMOL/L (ref 5–15)
AST SERPL-CCNC: 34 U/L (ref 1–40)
BARBITURATES UR QL SCN: NEGATIVE
BASOPHILS # BLD AUTO: 0.08 10*3/MM3 (ref 0–0.2)
BASOPHILS NFR BLD AUTO: 0.8 % (ref 0–1.5)
BENZODIAZ UR QL SCN: NEGATIVE
BILIRUB SERPL-MCNC: 0.6 MG/DL (ref 0–1.2)
BILIRUB UR QL STRIP: NEGATIVE
BUN SERPL-MCNC: 8 MG/DL (ref 6–20)
BUN/CREAT SERPL: 8.6 (ref 7–25)
BUPRENORPHINE SERPL-MCNC: NEGATIVE NG/ML
CALCIUM SPEC-SCNC: 9.2 MG/DL (ref 8.6–10.5)
CANNABINOIDS SERPL QL: NEGATIVE
CHLORIDE SERPL-SCNC: 105 MMOL/L (ref 98–107)
CK SERPL-CCNC: 304 U/L (ref 20–200)
CLARITY UR: CLEAR
CO2 SERPL-SCNC: 23 MMOL/L (ref 22–29)
COCAINE UR QL: NEGATIVE
COLOR UR: YELLOW
CREAT SERPL-MCNC: 0.93 MG/DL (ref 0.76–1.27)
DEPRECATED RDW RBC AUTO: 40.2 FL (ref 37–54)
EGFRCR SERPLBLD CKD-EPI 2021: 107.1 ML/MIN/1.73
EOSINOPHIL # BLD AUTO: 0.13 10*3/MM3 (ref 0–0.4)
EOSINOPHIL NFR BLD AUTO: 1.3 % (ref 0.3–6.2)
ERYTHROCYTE [DISTWIDTH] IN BLOOD BY AUTOMATED COUNT: 12.5 % (ref 12.3–15.4)
ETHANOL BLD-MCNC: 36 MG/DL (ref 0–10)
ETHANOL UR QL: 0.04 %
FENTANYL UR-MCNC: NEGATIVE NG/ML
GLOBULIN UR ELPH-MCNC: 3.2 GM/DL
GLUCOSE SERPL-MCNC: 110 MG/DL (ref 65–99)
GLUCOSE UR STRIP-MCNC: NEGATIVE MG/DL
HAV IGM SERPL QL IA: NORMAL
HBV CORE IGM SERPL QL IA: NORMAL
HBV SURFACE AG SERPL QL IA: NORMAL
HCT VFR BLD AUTO: 45.2 % (ref 37.5–51)
HCV AB SER QL: NORMAL
HGB BLD-MCNC: 15.5 G/DL (ref 13–17.7)
HGB UR QL STRIP.AUTO: NEGATIVE
IMM GRANULOCYTES # BLD AUTO: 0.06 10*3/MM3 (ref 0–0.05)
IMM GRANULOCYTES NFR BLD AUTO: 0.6 % (ref 0–0.5)
INR PPP: 1.03 (ref 0.9–1.1)
KETONES UR QL STRIP: ABNORMAL
LEUKOCYTE ESTERASE UR QL STRIP.AUTO: NEGATIVE
LYMPHOCYTES # BLD AUTO: 2.87 10*3/MM3 (ref 0.7–3.1)
LYMPHOCYTES NFR BLD AUTO: 28.8 % (ref 19.6–45.3)
MAGNESIUM SERPL-MCNC: 2.4 MG/DL (ref 1.6–2.6)
MCH RBC QN AUTO: 31.1 PG (ref 26.6–33)
MCHC RBC AUTO-ENTMCNC: 34.3 G/DL (ref 31.5–35.7)
MCV RBC AUTO: 90.8 FL (ref 79–97)
METHADONE UR QL SCN: NEGATIVE
MONOCYTES # BLD AUTO: 0.95 10*3/MM3 (ref 0.1–0.9)
MONOCYTES NFR BLD AUTO: 9.5 % (ref 5–12)
NEUTROPHILS NFR BLD AUTO: 5.89 10*3/MM3 (ref 1.7–7)
NEUTROPHILS NFR BLD AUTO: 59 % (ref 42.7–76)
NITRITE UR QL STRIP: NEGATIVE
NRBC BLD AUTO-RTO: 0 /100 WBC (ref 0–0.2)
OPIATES UR QL: NEGATIVE
OXYCODONE UR QL SCN: NEGATIVE
PCP UR QL SCN: NEGATIVE
PH UR STRIP.AUTO: 6.5 [PH] (ref 5–8)
PLATELET # BLD AUTO: 225 10*3/MM3 (ref 140–450)
PMV BLD AUTO: 10.8 FL (ref 6–12)
POTASSIUM SERPL-SCNC: 3.3 MMOL/L (ref 3.5–5.2)
PROT SERPL-MCNC: 7.3 G/DL (ref 6–8.5)
PROT UR QL STRIP: NEGATIVE
PROTHROMBIN TIME: 13.7 SECONDS (ref 12.1–14.7)
QT INTERVAL: 412 MS
QTC INTERVAL: 431 MS
RBC # BLD AUTO: 4.98 10*6/MM3 (ref 4.14–5.8)
SODIUM SERPL-SCNC: 142 MMOL/L (ref 136–145)
SP GR UR STRIP: 1.02 (ref 1–1.03)
TRICYCLICS UR QL SCN: NEGATIVE
UROBILINOGEN UR QL STRIP: ABNORMAL
WBC NRBC COR # BLD AUTO: 9.98 10*3/MM3 (ref 3.4–10.8)

## 2024-07-13 PROCEDURE — 71045 X-RAY EXAM CHEST 1 VIEW: CPT

## 2024-07-13 PROCEDURE — 82550 ASSAY OF CK (CPK): CPT | Performed by: STUDENT IN AN ORGANIZED HEALTH CARE EDUCATION/TRAINING PROGRAM

## 2024-07-13 PROCEDURE — 71045 X-RAY EXAM CHEST 1 VIEW: CPT | Performed by: RADIOLOGY

## 2024-07-13 PROCEDURE — 93005 ELECTROCARDIOGRAM TRACING: CPT

## 2024-07-13 PROCEDURE — 93005 ELECTROCARDIOGRAM TRACING: CPT | Performed by: PSYCHIATRY & NEUROLOGY

## 2024-07-13 PROCEDURE — 80307 DRUG TEST PRSMV CHEM ANLYZR: CPT | Performed by: EMERGENCY MEDICINE

## 2024-07-13 PROCEDURE — 82077 ASSAY SPEC XCP UR&BREATH IA: CPT | Performed by: STUDENT IN AN ORGANIZED HEALTH CARE EDUCATION/TRAINING PROGRAM

## 2024-07-13 PROCEDURE — 73130 X-RAY EXAM OF HAND: CPT | Performed by: RADIOLOGY

## 2024-07-13 PROCEDURE — 93010 ELECTROCARDIOGRAM REPORT: CPT | Performed by: INTERNAL MEDICINE

## 2024-07-13 PROCEDURE — 85610 PROTHROMBIN TIME: CPT | Performed by: STUDENT IN AN ORGANIZED HEALTH CARE EDUCATION/TRAINING PROGRAM

## 2024-07-13 PROCEDURE — 83735 ASSAY OF MAGNESIUM: CPT | Performed by: STUDENT IN AN ORGANIZED HEALTH CARE EDUCATION/TRAINING PROGRAM

## 2024-07-13 PROCEDURE — 99285 EMERGENCY DEPT VISIT HI MDM: CPT

## 2024-07-13 PROCEDURE — 80074 ACUTE HEPATITIS PANEL: CPT | Performed by: PSYCHIATRY & NEUROLOGY

## 2024-07-13 PROCEDURE — 99223 1ST HOSP IP/OBS HIGH 75: CPT | Performed by: PSYCHIATRY & NEUROLOGY

## 2024-07-13 PROCEDURE — 73130 X-RAY EXAM OF HAND: CPT

## 2024-07-13 PROCEDURE — 81003 URINALYSIS AUTO W/O SCOPE: CPT | Performed by: EMERGENCY MEDICINE

## 2024-07-13 PROCEDURE — 80053 COMPREHEN METABOLIC PANEL: CPT | Performed by: STUDENT IN AN ORGANIZED HEALTH CARE EDUCATION/TRAINING PROGRAM

## 2024-07-13 PROCEDURE — 85025 COMPLETE CBC W/AUTO DIFF WBC: CPT | Performed by: STUDENT IN AN ORGANIZED HEALTH CARE EDUCATION/TRAINING PROGRAM

## 2024-07-13 PROCEDURE — 36415 COLL VENOUS BLD VENIPUNCTURE: CPT

## 2024-07-13 RX ORDER — ONDANSETRON 4 MG/1
4 TABLET, ORALLY DISINTEGRATING ORAL EVERY 6 HOURS PRN
Status: DISCONTINUED | OUTPATIENT
Start: 2024-07-13 | End: 2024-07-15 | Stop reason: HOSPADM

## 2024-07-13 RX ORDER — TRAZODONE HYDROCHLORIDE 50 MG/1
50 TABLET ORAL NIGHTLY PRN
Status: DISCONTINUED | OUTPATIENT
Start: 2024-07-13 | End: 2024-07-15 | Stop reason: HOSPADM

## 2024-07-13 RX ORDER — LAMOTRIGINE 100 MG/1
25 TABLET ORAL NIGHTLY
Status: DISCONTINUED | OUTPATIENT
Start: 2024-07-14 | End: 2024-07-15 | Stop reason: HOSPADM

## 2024-07-13 RX ORDER — CLONIDINE HYDROCHLORIDE 0.1 MG/1
0.1 TABLET ORAL ONCE
Status: COMPLETED | OUTPATIENT
Start: 2024-07-13 | End: 2024-07-13

## 2024-07-13 RX ORDER — BENZONATATE 100 MG/1
100 CAPSULE ORAL 3 TIMES DAILY PRN
Status: DISCONTINUED | OUTPATIENT
Start: 2024-07-13 | End: 2024-07-15 | Stop reason: HOSPADM

## 2024-07-13 RX ORDER — BENZTROPINE MESYLATE 1 MG/ML
1 INJECTION INTRAMUSCULAR; INTRAVENOUS ONCE AS NEEDED
Status: DISCONTINUED | OUTPATIENT
Start: 2024-07-13 | End: 2024-07-15 | Stop reason: HOSPADM

## 2024-07-13 RX ORDER — PANTOPRAZOLE SODIUM 40 MG/1
40 TABLET, DELAYED RELEASE ORAL NIGHTLY
COMMUNITY

## 2024-07-13 RX ORDER — ALUMINA, MAGNESIA, AND SIMETHICONE 2400; 2400; 240 MG/30ML; MG/30ML; MG/30ML
15 SUSPENSION ORAL EVERY 6 HOURS PRN
Status: DISCONTINUED | OUTPATIENT
Start: 2024-07-13 | End: 2024-07-15 | Stop reason: HOSPADM

## 2024-07-13 RX ORDER — POTASSIUM CHLORIDE 20 MEQ/1
40 TABLET, EXTENDED RELEASE ORAL ONCE
Status: COMPLETED | OUTPATIENT
Start: 2024-07-13 | End: 2024-07-13

## 2024-07-13 RX ORDER — ESCITALOPRAM OXALATE 10 MG/1
10 TABLET ORAL NIGHTLY
COMMUNITY

## 2024-07-13 RX ORDER — IBUPROFEN 400 MG/1
400 TABLET ORAL EVERY 6 HOURS PRN
Status: DISCONTINUED | OUTPATIENT
Start: 2024-07-13 | End: 2024-07-15 | Stop reason: HOSPADM

## 2024-07-13 RX ORDER — LOPERAMIDE HYDROCHLORIDE 2 MG/1
2 CAPSULE ORAL
Status: DISCONTINUED | OUTPATIENT
Start: 2024-07-13 | End: 2024-07-15 | Stop reason: HOSPADM

## 2024-07-13 RX ORDER — ECHINACEA PURPUREA EXTRACT 125 MG
2 TABLET ORAL AS NEEDED
Status: DISCONTINUED | OUTPATIENT
Start: 2024-07-13 | End: 2024-07-15 | Stop reason: HOSPADM

## 2024-07-13 RX ORDER — FAMOTIDINE 20 MG/1
20 TABLET, FILM COATED ORAL 2 TIMES DAILY PRN
Status: DISCONTINUED | OUTPATIENT
Start: 2024-07-13 | End: 2024-07-15 | Stop reason: HOSPADM

## 2024-07-13 RX ORDER — POLYETHYLENE GLYCOL 3350 17 G/17G
17 POWDER, FOR SOLUTION ORAL DAILY PRN
Status: DISCONTINUED | OUTPATIENT
Start: 2024-07-13 | End: 2024-07-15 | Stop reason: HOSPADM

## 2024-07-13 RX ORDER — PANTOPRAZOLE SODIUM 40 MG/1
40 TABLET, DELAYED RELEASE ORAL NIGHTLY
Status: DISCONTINUED | OUTPATIENT
Start: 2024-07-13 | End: 2024-07-15 | Stop reason: HOSPADM

## 2024-07-13 RX ORDER — FLUOXETINE HYDROCHLORIDE 20 MG/1
20 CAPSULE ORAL NIGHTLY
Status: DISCONTINUED | OUTPATIENT
Start: 2024-07-14 | End: 2024-07-15 | Stop reason: HOSPADM

## 2024-07-13 RX ORDER — BUSPIRONE HYDROCHLORIDE 15 MG/1
15 TABLET ORAL 2 TIMES DAILY PRN
COMMUNITY

## 2024-07-13 RX ORDER — BUSPIRONE HYDROCHLORIDE 15 MG/1
15 TABLET ORAL NIGHTLY
COMMUNITY

## 2024-07-13 RX ORDER — LAMOTRIGINE 25 MG/1
50 TABLET ORAL NIGHTLY
COMMUNITY

## 2024-07-13 RX ORDER — LAMOTRIGINE 25 MG/1
25 TABLET ORAL NIGHTLY
COMMUNITY

## 2024-07-13 RX ORDER — ACETAMINOPHEN 325 MG/1
650 TABLET ORAL EVERY 6 HOURS PRN
Status: DISCONTINUED | OUTPATIENT
Start: 2024-07-13 | End: 2024-07-13

## 2024-07-13 RX ORDER — NICOTINE 21 MG/24HR
1 PATCH, TRANSDERMAL 24 HOURS TRANSDERMAL
Status: DISCONTINUED | OUTPATIENT
Start: 2024-07-13 | End: 2024-07-15 | Stop reason: HOSPADM

## 2024-07-13 RX ORDER — BENZTROPINE MESYLATE 1 MG/1
2 TABLET ORAL ONCE AS NEEDED
Status: DISCONTINUED | OUTPATIENT
Start: 2024-07-13 | End: 2024-07-15 | Stop reason: HOSPADM

## 2024-07-13 RX ORDER — FLUOXETINE HYDROCHLORIDE 20 MG/1
20 CAPSULE ORAL NIGHTLY
COMMUNITY
End: 2024-07-15

## 2024-07-13 RX ADMIN — PANTOPRAZOLE SODIUM 40 MG: 40 TABLET, DELAYED RELEASE ORAL at 23:32

## 2024-07-13 RX ADMIN — BUSPIRONE HYDROCHLORIDE 15 MG: 5 TABLET ORAL at 23:32

## 2024-07-13 RX ADMIN — POTASSIUM CHLORIDE 40 MEQ: 1500 TABLET, EXTENDED RELEASE ORAL at 08:33

## 2024-07-13 RX ADMIN — CLONIDINE HYDROCHLORIDE 0.1 MG: 0.1 TABLET ORAL at 13:14
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
## 2024-07-13 NOTE — NURSING NOTE
Called and spoke to Dr. NYLA Hopson. Given assessment, labs, and clinicals. Instructed to admit this pt with routine orders, SP3. RBTOx2 Discussed with ER provider.

## 2024-07-13 NOTE — ED NOTES
Patient is currently laying in bed resting on ER stretcher. Sitter at bedside. All safety measures in place.

## 2024-07-13 NOTE — PLAN OF CARE
Goal Outcome Evaluation:  Plan of Care Reviewed With: patient  Patient Agreement with Plan of Care: agrees     Progress: no change  Outcome Evaluation: New admit; patient has been calm and cooperative since arrival to the unit. Patient has been withdrawn and has isolated self in his room. Patient reports feeling worthless, however, denies any anxiety or depression.

## 2024-07-13 NOTE — NURSING NOTE
Patient's blood pressure elevated. Patient reports no history of hypertension. Dr. LAINE Hopson notified. New order for clonidine 0.1 mg PO x 1 dose received; RBVTO x 2.
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
## 2024-07-13 NOTE — H&P
INITIAL PSYCHIATRIC HISTORY & PHYSICAL    Patient Identification:  Name:  Amos Griffin  Age:  39 y.o.  Sex:  male  :  1985  MRN:  [id number]   Visit Number:  [id number]  Primary Care Physician:  Gladis Guerrero APRN    SUBJECTIVE    CC/Focus of Exam: Suicidal    HPI: Amos Griffin is a 39 y.o. male who was admitted on 2024 and evaluated on 2024 with complaints of suicidal ideation.  Patient states his wife brought him in for suicidal ideation.  Patient states that they have been  for about a month.  Patient states that last night when he got home that him and his wife got into a fight and he states he did not want any drama he just wanted to go to bed.  Patient states after she left he opened his gun safe but he found out his wife had taken them.  Patient states that he had thoughts to shoot himself.  Patient states he has had suicidal ideations since 2023.  Patient denies any substance abuse.  Patient states he drinks 2-3 beers.  Patient denies any tobacco use.  Patient denies any stressors in his life.  Patient denies any history of physical, mental, or sexual abuse.  Patient rates his appetite as good.  Patient rates his sleep as good.  Patient denies any nightmares.  Patient rates his anxiety on a scale of 1-10 with 10 being the most severe a 10.  Patient rates his depression on a scale of 1-10 with 10 being the most severe a 10.  Patient states he has suicidal ideation.  Patient denies any homicidal ideation.  Patient denies any hallucinations.  Patient endorses depressed mood, sx of anhedonia, low energy, fatigue, decreased motivation. Onset: months ago.    Screening questions reveal a h/o prior episodes of depression.  No history of red or hypomania based on extensive screening questions posed by me today.   Patient was admitted to [facility] psychiatry for further safety and stabilization.    PAST PSYCHIATRIC HX: Patient has had no prior  "admissions.  Patient states he receives outpatient care with [facility]. Patient reports a h/o X1 prior suicide attempt that was aborted by his wife 10 years ago - \"she stopped me from doing it.\"    SUBSTANCE USE HX: UDS was negative.  See HPI for current use.    SOCIAL HX: Patient states he was born and raised in [place].  Patient states he currently resides with his family in [facility] [place] [facility].  Patient states he is  but states they are currently .  Patient states that he has 3 children.  Patient states 2 are grown and 1 lives with his wife.  Patient states he is currently employed with [employer].  Patient states he has 8th grade education.  Patient denies any legal issues.    FAMILY HX: Patient denies any family history of mental illness    Past Medical History:   Diagnosis Date    Chest pain     COVID-19     GERD (gastroesophageal reflux disease)     Mood disorder           Past Surgical History:   Procedure Laterality Date    APPENDECTOMY      CHOLECYSTECTOMY      COLONOSCOPY N/A 9/26/2017    Procedure: COLONOSCOPY CPT CODE: 32903;  Surgeon: Clinton Bernal III, MD;  Location: [facility];  Service:     ENDOSCOPY N/A 9/26/2017    Procedure: ESOPHAGOGASTRODUODENOSCOPY WITH DILATATION CPT CODE: 79900;  Surgeon: Clinton Bernal III, MD;  Location: [facility];  Service:     KNEE SURGERY         Family History   Problem Relation Age of Onset    No Known Problems Mother     No Known Problems Father     No Known Problems Sister     No Known Problems Brother     No Known Problems Daughter     No Known Problems Son     Heart disease Maternal Grandmother     No Known Problems Maternal Grandfather     No Known Problems Paternal Grandmother     No Known Problems Paternal Grandfather     No Known Problems Cousin     Rheum arthritis Neg Hx     Osteoarthritis Neg Hx     Asthma Neg Hx     Diabetes Neg Hx     Heart failure Neg Hx     Hyperlipidemia Neg Hx     Hypertension " Neg Hx     Migraines Neg Hx     Rashes / Skin problems Neg Hx     Seizures Neg Hx     Stroke Neg Hx     Thyroid disease Neg Hx          Medications Prior to Admission   Medication Sig Dispense Refill Last Dose    busPIRone (BUSPAR) 15 MG tablet Take 1 tablet by mouth 3 (Three) Times a Day. 90 tablet 2     FLUoxetine (PROzac) 20 MG capsule Take 1 capsule by mouth Daily. In addition to 40 mg. 30 capsule 1     FLUoxetine (PROzac) 40 MG capsule Take 1 capsule by mouth Every Night. 30 capsule 5     pantoprazole (PROTONIX) 40 MG EC tablet Take 1 tablet by mouth Daily. 30 tablet 5     tadalafil (Cialis) 5 MG tablet Take 1 tablet by mouth Daily As Needed (BPH). 90 tablet 2          ALLERGIES:  Penicillins, Phenergan [promethazine hcl], and Vistaril [hydroxyzine hcl]    Temp:  [97.1 °F (36.2 °C)-97.6 °F (36.4 °C)] 97.1 °F (36.2 °C)  Heart Rate:  [66-80] 70  Resp:  [18] 18  BP: (135-164)/() 156/103    REVIEW OF SYSTEMS:  Review of Systems   Constitutional:  Positive for activity change and appetite change.   HENT: Negative.     Eyes: Negative.    Respiratory: Negative.     Cardiovascular: Negative.    Gastrointestinal: Negative.    Endocrine: Negative.    Genitourinary: Negative.    Musculoskeletal: Negative.    Skin: Negative.    Allergic/Immunologic: Negative.    Neurological: Negative.    Hematological: Negative.    All other systems reviewed and are negative.       OBJECTIVE    PHYSICAL EXAM:  Physical Exam  Constitutional:       Appearance: He is well-developed.   HENT:      Head: Normocephalic and atraumatic.      Right Ear: External ear normal.      Nose: Nose normal.   Eyes:      General: No scleral icterus.        Right eye: No discharge.         Left eye: No discharge.      Conjunctiva/sclera: Conjunctivae normal.      Pupils: Pupils are equal, round, and reactive to light.   Neck:      Thyroid: No thyromegaly.      Vascular: No JVD.      Trachea: No tracheal deviation.   Cardiovascular:      Rate and Rhythm:  Normal rate and regular rhythm.      Heart sounds: Normal heart sounds. No murmur heard.     No friction rub. No gallop.   Pulmonary:      Effort: Pulmonary effort is normal. No respiratory distress.      Breath sounds: Normal breath sounds. No stridor. No wheezing or rales.   Abdominal:      General: Bowel sounds are normal. There is no distension.      Palpations: Abdomen is soft. There is no mass.      Tenderness: There is no abdominal tenderness. There is no guarding or rebound.   Musculoskeletal:         General: No tenderness or deformity. Normal range of motion.   Lymphadenopathy:      Cervical: No cervical adenopathy.   Skin:     General: Skin is warm and dry.      Findings: No erythema or rash.   Neurological:      Mental Status: He is alert and oriented to person, place, and time.      Cranial Nerves: No cranial nerve deficit.      Motor: No abnormal muscle tone.      Deep Tendon Reflexes: Reflexes normal.         MENTAL STATUS EXAM:    Hygiene:   fair  Cooperation:  Cooperative  Eye Contact:  Good  Psychomotor Behavior:  Appropriate  Affect:  Appropriate  Hopelessness: 5  Speech:  Normal  Linear  Thought Content:  Normal  Suicidal:  Suicidal Ideation  Homicidal:  None  Hallucinations:  None  Delusion:  None  Memory:  Intact  Orientation:  Person, Place, Time, and Situation  Reliability:  fair  Insight:  Fair  Judgement:  Poor  Impulse Control:  Poor      Imaging Results (Last 24 Hours)       ** No results found for the last 24 hours. **             ECG/EMG Results (most recent)       None             Lab Results   Component Value Date    GLUCOSE 110 (H) 07/13/2024    BUN 8 07/13/2024    CREATININE 0.93 07/13/2024    EGFRIFNONA 105 09/09/2021    BCR 8.6 07/13/2024    CO2 23.0 07/13/2024    CALCIUM 9.2 07/13/2024    PROTENTOTREF 7.2 04/25/2024    ALBUMIN 4.1 07/13/2024    LABIL2 1.5 04/25/2024    AST 34 07/13/2024    ALT 47 (H) 07/13/2024       Lab Results   Component Value Date    WBC 9.98 07/13/2024     HGB 15.5 07/13/2024    HCT 45.2 07/13/2024    MCV 90.8 07/13/2024     07/13/2024       Last Urine Toxicity  More data may exist         Latest Ref Rng & Units 7/13/2024 5/24/2021   LAST URINE TOXICITY RESULTS   Amphetamine, Urine Qual Negative Negative  Negative    Barbiturates Screen, Urine Negative Negative  Negative    Benzodiazepine Screen, Urine Negative Negative  Negative    Buprenorphine, Screen, Urine Negative Negative  Negative    Cocaine Screen, Urine Negative Negative  Negative    Fentanyl, Urine Negative Negative  -   Methadone Screen , Urine Negative Negative  Negative    Methamphetamine, Ur Negative Negative  -       Brief Urine Lab Results  (Last result in the past 365 days)        Color   Clarity   Blood   Leuk Est   Nitrite   Protein   CREAT   Urine HCG        07/13/24 0832 Yellow   Clear   Negative   Negative   Negative   Negative                       ASSESSMENT & PLAN:  Suicidal Ideation  Major Depressive Disorder, recurrent, severe without psychotic features     Given the high risk and/or potentially life-threatening nature of patient's presenting symptoms, patient has been admitted for safety and stabilization and placed on the appropriate level of precautions.  Patient will be assigned a Master's level therapist and encouraged to participate in both individual and group therapy on the unit.  Routine labs have been ordered.    CODE STATUS: Full    Increase prozac to 80mg Daily   Continue Buspar 15mg TID  Add Abilify 5mg QHS    GERD  - protonix    Hypokalemia  -3.3 on admission, Patient asymptomatic  - Monitor  - Repeat BMP in AM              Further treatment planning as per course.    I have discussed with the patient the risks, benefits, side effects, and treatment alternatives to the medication being prescribed. Patient has also been instructed regarding the risks versus benefits of no treatment and also the fact that treatment does not guarantee results.  Patient voices an  understanding of this and accepts the risks associated with the use of this medication. Patient agrees to notify all of their prescribers of the recent medication change.    IKahlil MD, personally performed the services described in this documentation as scribed by the below named individual and is both accurate and complete   as of 7/13/2024.     This note was generated using a scribe, Hyaley Lopez.  The work documented in this note was completed, reviewed, and approved by the attending psychiatrist as designated Dr.Brian Hopson electronic signature.

## 2024-07-14 LAB
QT INTERVAL: 412 MS
QTC INTERVAL: 438 MS

## 2024-07-14 PROCEDURE — 99232 SBSQ HOSP IP/OBS MODERATE 35: CPT | Performed by: PSYCHIATRY & NEUROLOGY

## 2024-07-14 PROCEDURE — 93005 ELECTROCARDIOGRAM TRACING: CPT | Performed by: STUDENT IN AN ORGANIZED HEALTH CARE EDUCATION/TRAINING PROGRAM

## 2024-07-14 RX ADMIN — FLUOXETINE HYDROCHLORIDE 20 MG: 20 CAPSULE ORAL at 21:37

## 2024-07-14 RX ADMIN — LAMOTRIGINE 25 MG: 100 TABLET ORAL at 21:37

## 2024-07-14 RX ADMIN — BUSPIRONE HYDROCHLORIDE 15 MG: 5 TABLET ORAL at 21:37

## 2024-07-14 RX ADMIN — PANTOPRAZOLE SODIUM 40 MG: 40 TABLET, DELAYED RELEASE ORAL at 21:38

## 2024-07-14 NOTE — PROGRESS NOTES
"      Inpatient Psych Progress Note     Clinician: Kahlil Hopson MD  Admission Date: 7/13/2024  07:53 EDT 07/14/24    Behavioral Health Treatment Plan and Problem List: I have reviewed and approved the Behavioral Health Treatment Plan and Problem list.    Allergies  Allergies   Allergen Reactions    Penicillins     Phenergan [Promethazine Hcl]     Vistaril [Hydroxyzine Hcl]        Hospital Day: 1 day      Assessment completed within view of staff    History  CC/clinical focus: suicidal     Interval HPI: Patient seen and evaluated by me.  Chart reviewed.  Patient admitted yesterday for SI. He reports some improvement this morning, no SI at this time but does feel \"a little depressed.\" Prozac increased and Abilify added on admission - he is tolerating these medication adjustments well so far. No HI/AVH. No concerns from nursing staff.   Med Compliant.  ROS otherwise as below.    Review of Systems   Constitutional:  Positive for activity change and appetite change.   HENT: Negative.     Eyes: Negative.    Respiratory: Negative.     Cardiovascular: Negative.    Gastrointestinal: Negative.    Endocrine: Negative.    Genitourinary: Negative.    Musculoskeletal: Negative.    Skin: Negative.    Allergic/Immunologic: Negative.    Neurological: Negative.    Hematological: Negative.         /85 (BP Location: Right arm, Patient Position: Sitting)   Pulse 72   Temp 97.8 °F (36.6 °C) (Temporal)   Resp 18   Ht 182.9 cm (72\")   Wt 135 kg (297 lb 3.2 oz)   SpO2 96%   BMI 40.31 kg/m²     Mental Status Exam  Mood: depressed  Affect: mood-congruent   Thought Processes:  linear  Thought Content: normal  Hallucinations: no  Suicidal Thoughts: denies  Suicidal Plan/Intent: denies  Hopelesness:Moderate  Homicidal Thoughts:  denies      Medical Decision Making:   Labs:     Lab Results (last 24 hours)       Procedure Component Value Units Date/Time    Hepatitis Panel, Acute [[id number]]  (Normal) Collected: 07/13/24 1237    " Specimen: Blood Updated: 07/13/24 1323     Hepatitis B Surface Ag Non-Reactive     Hep A IgM Non-Reactive     Hep B C IgM Non-Reactive     Hepatitis C Ab Non-Reactive    Narrative:      Results may be falsely decreased if patient taking Biotin.               Radiology:     Imaging Results (Last 24 Hours)       ** No results found for the last 24 hours. **              EKG:     ECG/EMG Results (most recent)       Procedure Component Value Units Date/Time    ECG 12 Lead Other; Baseline Cardiac Status [[id number]] Collected: 07/13/24 1430     Updated: 07/13/24 1938     QT Interval 412 ms      QTC Interval 431 ms     Narrative:      Test Reason : Other~  Blood Pressure :   */*   mmHG  Vent. Rate :  66 BPM     Atrial Rate :  66 BPM     P-R Int : 208 ms          QRS Dur : 104 ms      QT Int : 412 ms       P-R-T Axes :  30  -2  42 degrees     QTc Int : 431 ms    Normal sinus rhythm  Normal ECG  When compared with ECG of 13-JUL-2024 05:59, (Unconfirmed)  No significant change was found  Confirmed by Roberto Dunaway ([id number]) on 7/13/2024 7:38:04 PM    Referred By: MINERVA           Confirmed By: Roberto Dunaway             Medications:  busPIRone, 15 mg, Oral, Nightly  FLUoxetine, 20 mg, Oral, Nightly  lamoTRIgine, 25 mg, Oral, Nightly  nicotine, 1 patch, Transdermal, Q24H  pantoprazole, 40 mg, Oral, Nightly           All medications reviewed.      Assessment and Plan:      Suicidal Ideation  - Admitted for safety and stabilization  - SP3    Major Depressive Disorder, recurrent, severe without psychotic features   - Increased prozac to 80mg Daily on admission   - Continue Buspar 15mg TID  - Added Abilify 5mg QHS     GERD  - protonix     Hypokalemia  -3.3 on admission, Patient asymptomatic  - Monitor  - Repeat BMP in AM      Continue hospitalization for safety and stabilization.  Continue current level of Special Precautions (q15 minute checks).        This note was generated by Surinder rivera. The work  documented in this note was completed, reviewed, and approved by the attending psychiatrist as designated Dr. Khalil Hopson electronic signature.     I, Kahlil Hopson MD, personally performed the services described in this documentation as scribed by the above named individual and is both accurate and complete as of 7/14/2024.

## 2024-07-14 NOTE — PLAN OF CARE
Problem: Adult Behavioral Health Plan of Care  Goal: Plan of Care Review  Outcome: Ongoing, Progressing  Flowsheets  Taken 7/14/2024 1259  Progress: improving  Outcome Evaluation: PATIENT VERBALIZES MODERATE ANXIETY AND DEPRESSIONB AS ONLY PROBLEMS THIS SHIFT. PATIENT REMAINS IN HIS ROOM MOST OF THE TIME BUT IS EATING AND DRINKING. PATIENT IS AOX3, COOPERATIVE WITH NO S/S OF ACUTE DISTRESS NOTED. NEW ORDERS: BMP  Taken 7/14/2024 0808  Plan of Care Reviewed With: patient  Patient Agreement with Plan of Care: agrees   Goal Outcome Evaluation:  Plan of Care Reviewed With: patient  Patient Agreement with Plan of Care: agrees     Progress: improving  Outcome Evaluation: PATIENT VERBALIZES MODERATE ANXIETY AND DEPRESSIONB AS ONLY PROBLEMS THIS SHIFT. PATIENT REMAINS IN HIS ROOM MOST OF THE TIME BUT IS EATING AND DRINKING. PATIENT IS AOX3, COOPERATIVE WITH NO S/S OF ACUTE DISTRESS NOTED. NEW ORDERS: [facility]

## 2024-07-14 NOTE — PLAN OF CARE
Goal Outcome Evaluation:  Plan of Care Reviewed With: patient  Patient Agreement with Plan of Care: agrees     Progress: no change  Outcome Evaluation: Pt reports poor appetite and sleep. A/D 6. Denies SI/HI/AVH.

## 2024-07-15 VITALS
DIASTOLIC BLOOD PRESSURE: 65 MMHG | SYSTOLIC BLOOD PRESSURE: 109 MMHG | TEMPERATURE: 97.6 F | WEIGHT: 297.2 LBS | HEIGHT: 72 IN | OXYGEN SATURATION: 95 % | HEART RATE: 70 BPM | BODY MASS INDEX: 40.26 KG/M2 | RESPIRATION RATE: 18 BRPM

## 2024-07-15 PROBLEM — R45.851 SUICIDAL IDEATION: Status: ACTIVE | Noted: 2024-07-15

## 2024-07-15 PROBLEM — F33.2 SEVERE RECURRENT MAJOR DEPRESSION WITHOUT PSYCHOTIC FEATURES: Status: ACTIVE | Noted: 2024-07-13

## 2024-07-15 PROBLEM — F43.23 ADJUSTMENT DISORDER WITH MIXED ANXIETY AND DEPRESSED MOOD: Status: ACTIVE | Noted: 2024-07-15

## 2024-07-15 LAB
ANION GAP SERPL CALCULATED.3IONS-SCNC: 12.3 MMOL/L (ref 5–15)
BUN SERPL-MCNC: 12 MG/DL (ref 6–20)
BUN/CREAT SERPL: 10.4 (ref 7–25)
CALCIUM SPEC-SCNC: 9.3 MG/DL (ref 8.6–10.5)
CHLORIDE SERPL-SCNC: 102 MMOL/L (ref 98–107)
CO2 SERPL-SCNC: 26.7 MMOL/L (ref 22–29)
CREAT SERPL-MCNC: 1.15 MG/DL (ref 0.76–1.27)
EGFRCR SERPLBLD CKD-EPI 2021: 83 ML/MIN/1.73
GLUCOSE SERPL-MCNC: 96 MG/DL (ref 65–99)
POTASSIUM SERPL-SCNC: 3.4 MMOL/L (ref 3.5–5.2)
SODIUM SERPL-SCNC: 141 MMOL/L (ref 136–145)

## 2024-07-15 PROCEDURE — 80048 BASIC METABOLIC PNL TOTAL CA: CPT | Performed by: PSYCHIATRY & NEUROLOGY

## 2024-07-15 PROCEDURE — 99239 HOSP IP/OBS DSCHRG MGMT >30: CPT | Performed by: PSYCHIATRY & NEUROLOGY

## 2024-07-15 NOTE — PROGRESS NOTES
"INPATIENT PSYCHIATRIC PROGRESS NOTE    Name:  Amos Griffin  :  1985  MRN:  [id number]  Visit Number:  [id number]  Length of stay:  2    SUBJECTIVE    CC/Focus of Exam: SI    INTERVAL HISTORY:  The patient reports he came to the hospital because he was having suicidal thoughts. It was triggered by an argument he had with his wife, they are  and patient has been living by himself and he got home late and his estranged wife was there and she started an argument and it pushed the patient over the edge.   He states he is no longer feeling suicidal but is having a lot of depressed and anxiety. States being here is making him feel worse and would like to go home today. States he has an appt tomorrow with a mental health provider and he intends to keep it. He wants to get back to work and get back to normal. Also is hoping that he can get back with his wife and work things out as she also wants to come back.   Depression rating 3/10  Anxiety rating 3/10  Sleep: good  Withdrawal sx: None  Cravin/10    Review of Systems   Constitutional: Negative.    Respiratory: Negative.     Cardiovascular: Negative.    Gastrointestinal: Negative.        OBJECTIVE    Temp:  [97.3 °F (36.3 °C)-97.6 °F (36.4 °C)] 97.6 °F (36.4 °C)  Heart Rate:  [70-76] 70  Resp:  [18-20] 18  BP: (109-149)/(65-92) 109/65    MENTAL STATUS EXAM:  Appearance:Casually dressed, good hygeine.   Cooperation:Cooperative  Psychomotor: No psychomotor agitation/retardation, No EPS, No motor tics  Speech-normal rate, amount.  Mood \"better\"   Affect-congruent, appropriate, stable  Thought Content-goal directed, no delusional material present  Thought process-linear, organized.  Suicidality: No SI  Homicidality: No HI  Perception: No AH/VH  Insight-fair   Judgement-fair    Lab Results (last 24 hours)       Procedure Component Value Units Date/Time    Basic Metabolic Panel [[id number]]  (Abnormal) Collected: 07/15/24 0400    Specimen: Blood Updated: " 07/15/24 0501     Glucose 96 mg/dL      BUN 12 mg/dL      Creatinine 1.15 mg/dL      Sodium 141 mmol/L      Potassium 3.4 mmol/L      Comment: Slight hemolysis detected by analyzer. Result may be falsely elevated.        Chloride 102 mmol/L      CO2 26.7 mmol/L      Calcium 9.3 mg/dL      BUN/Creatinine Ratio 10.4     Anion Gap 12.3 mmol/L      eGFR 83.0 mL/min/1.73     Narrative:      GFR Normal >60  Chronic Kidney Disease <60  Kidney Failure <15                 Imaging Results (Last 24 Hours)       ** No results found for the last 24 hours. **               ECG/EMG Results (most recent)       Procedure Component Value Units Date/Time    ECG 12 Lead Other; Baseline Cardiac Status [[id number]] Collected: 07/13/24 1430     Updated: 07/13/24 1938     QT Interval 412 ms      QTC Interval 431 ms     Narrative:      Test Reason : Other~  Blood Pressure :   */*   mmHG  Vent. Rate :  66 BPM     Atrial Rate :  66 BPM     P-R Int : 208 ms          QRS Dur : 104 ms      QT Int : 412 ms       P-R-T Axes :  30  -2  42 degrees     QTc Int : 431 ms    Normal sinus rhythm  Normal ECG  When compared with ECG of 13-JUL-2024 05:59, (Unconfirmed)  No significant change was found  Confirmed by Roberto Dunaway ([id number]) on 7/13/2024 7:38:04 PM    Referred By: MINERVA           Confirmed By: Roberto Dunaway             ALLERGIES: Penicillins, Phenergan [promethazine hcl], and Vistaril [hydroxyzine hcl]    Medication Review:   Scheduled Medications:  busPIRone, 15 mg, Oral, Nightly  FLUoxetine, 20 mg, Oral, Nightly  lamoTRIgine, 25 mg, Oral, Nightly  nicotine, 1 patch, Transdermal, Q24H  pantoprazole, 40 mg, Oral, Nightly         PRN Medications:    aluminum-magnesium hydroxide-simethicone    benzonatate    benztropine **OR** benztropine    busPIRone    famotidine    ibuprofen    loperamide    magnesium hydroxide    ondansetron ODT    polyethylene glycol    sodium chloride    traZODone   All medications reviewed.    ASSESSMENT &  PLAN:    Suicidal Ideation  - Admitted for safety and stabilization  - SP3    Adjustment disorder with anxiety and depression  -Continue current medication regimen     Major Depressive Disorder, recurrent, severe without psychotic features   - Continue fluoxetine and patient is on a taper and will be switched to escitalopram    GERD  - protonix     Hypokalemia  -Improved    Patient reports he is feeling better and denies active SI and wants to go home.     Special precautions: Special Precautions Level 3 (q15 min checks) .    Behavioral Health Treatment Plan and Problem List: I have reviewed and approved the Behavioral Health Treatment Plan and Problem list.  The patient has had a chance to review and agrees with the treatment plan.    Copied text in portions of this note has been reviewed and is accurate as of 07/15/24         Clinician:  Addy Vale MD  07/15/24  09:59 EDT

## 2024-07-15 NOTE — DISCHARGE SUMMARY
":  1985  MRN:  [id number]  Visit Number:  [id number]      Date of Admission:2024   Date of Discharge:  7/15/2024    Discharge Diagnosis:  Principal Problem:    Severe recurrent major depression without psychotic features  Active Problems:    Gastroesophageal reflux disease without esophagitis    Suicidal ideation    Adjustment disorder with mixed anxiety and depressed mood        Admission Diagnosis:  Depression with suicidal ideation [F32.A, R45.851]     HPI  Amos Griffin is a 39 y.o. male who was admitted on 2024 and evaluated on 2024 with complaints of suicidal ideation.   For details please see H&P dated 7/15/24.     Hospital Course  Patient is a 39 y.o. male presented with suicidal ideations after an argument with his wife. He was admitted to the adult psych unit for safety, further evaluation and treatment. The patient is currently in outpatient treatment for depression and mood symptoms. He was continued on fluoxetine and he is being tapered off of it as an outpatient and will be switched to escitalopram. Initially the treatment plan was to increase the fluoxetine and add aripiprazole but patient was continued on his home regimen and his mood appeared to improve. He was upset because of an argument with his wife but reported that he and his wife wanted to make things better and was hopeful about things working out. The day of discharge he reported feeling better and denied any active thoughts of harm to self or others. Stated that he and his wife are working on rapprochement and he would feel better if he went home and also wanted to go back to work.       Mental Status Exam upon discharge:   Mood \"good\"   Affect-congruent, appropriate, stable  Thought Content-goal directed, no delusional material present  Thought process-linear, organized.  Suicidality: No SI  Homicidality: No HI  Perception: No AH/VH    Procedures Performed         Consults:   Consults       No orders found from " 6/14/2024 to 7/14/2024.            Pertinent Test Results:   Admission on 07/13/2024   Component Date Value Ref Range Status    Hepatitis B Surface Ag 07/13/2024 Non-Reactive  Non-Reactive Final    Hep A IgM 07/13/2024 Non-Reactive  Non-Reactive Final    Hep B C IgM 07/13/2024 Non-Reactive  Non-Reactive Final    Hepatitis C Ab 07/13/2024 Non-Reactive  Non-Reactive Final    QT Interval 07/13/2024 412  ms Final    QTC Interval 07/13/2024 431  ms Final    Glucose 07/15/2024 96  65 - 99 mg/dL Final    BUN 07/15/2024 12  6 - 20 mg/dL Final    Creatinine 07/15/2024 1.15  0.76 - 1.27 mg/dL Final    Sodium 07/15/2024 141  136 - 145 mmol/L Final    Potassium 07/15/2024 3.4 (L)  3.5 - 5.2 mmol/L Final    Slight hemolysis detected by analyzer. Result may be falsely elevated.    Chloride 07/15/2024 102  98 - 107 mmol/L Final    CO2 07/15/2024 26.7  22.0 - 29.0 mmol/L Final    Calcium 07/15/2024 9.3  8.6 - 10.5 mg/dL Final    BUN/Creatinine Ratio 07/15/2024 10.4  7.0 - 25.0 Final    Anion Gap 07/15/2024 12.3  5.0 - 15.0 mmol/L Final    eGFR 07/15/2024 83.0  >60.0 mL/min/1.73 Final   Admission on 07/13/2024, Discharged on 07/13/2024   Component Date Value Ref Range Status    Glucose 07/13/2024 110 (H)  65 - 99 mg/dL Final    BUN 07/13/2024 8  6 - 20 mg/dL Final    Creatinine 07/13/2024 0.93  0.76 - 1.27 mg/dL Final    Sodium 07/13/2024 142  136 - 145 mmol/L Final    Potassium 07/13/2024 3.3 (L)  3.5 - 5.2 mmol/L Final    Slight hemolysis detected by analyzer. Result may be falsely elevated.    Chloride 07/13/2024 105  98 - 107 mmol/L Final    CO2 07/13/2024 23.0  22.0 - 29.0 mmol/L Final    Calcium 07/13/2024 9.2  8.6 - 10.5 mg/dL Final    Total Protein 07/13/2024 7.3  6.0 - 8.5 g/dL Final    Albumin 07/13/2024 4.1  3.5 - 5.2 g/dL Final    ALT (SGPT) 07/13/2024 47 (H)  1 - 41 U/L Final    AST (SGOT) 07/13/2024 34  1 - 40 U/L Final    Alkaline Phosphatase 07/13/2024 97  39 - 117 U/L Final    Total Bilirubin 07/13/2024 0.6  0.0 -  1.2 mg/dL Final    Globulin 07/13/2024 3.2  gm/dL Final    A/G Ratio 07/13/2024 1.3  g/dL Final    BUN/Creatinine Ratio 07/13/2024 8.6  7.0 - 25.0 Final    Anion Gap 07/13/2024 14.0  5.0 - 15.0 mmol/L Final    eGFR 07/13/2024 107.1  >60.0 mL/min/1.73 Final    Protime 07/13/2024 13.7  12.1 - 14.7 Seconds Final    INR 07/13/2024 1.03  0.90 - 1.10 Final    Magnesium 07/13/2024 2.4  1.6 - 2.6 mg/dL Final    Creatine Kinase 07/13/2024 304 (H)  20 - 200 U/L Final    Ethanol 07/13/2024 36 (H)  0 - 10 mg/dL Final    Ethanol % 07/13/2024 0.036  % Final    WBC 07/13/2024 9.98  3.40 - 10.80 10*3/mm3 Final    RBC 07/13/2024 4.98  4.14 - 5.80 10*6/mm3 Final    Hemoglobin 07/13/2024 15.5  13.0 - 17.7 g/dL Final    Hematocrit 07/13/2024 45.2  37.5 - 51.0 % Final    MCV 07/13/2024 90.8  79.0 - 97.0 fL Final    MCH 07/13/2024 31.1  26.6 - 33.0 pg Final    MCHC 07/13/2024 34.3  31.5 - 35.7 g/dL Final    RDW 07/13/2024 12.5  12.3 - 15.4 % Final    RDW-SD 07/13/2024 40.2  37.0 - 54.0 fl Final    MPV 07/13/2024 10.8  6.0 - 12.0 fL Final    Platelets 07/13/2024 225  140 - 450 10*3/mm3 Final    Neutrophil % 07/13/2024 59.0  42.7 - 76.0 % Final    Lymphocyte % 07/13/2024 28.8  19.6 - 45.3 % Final    Monocyte % 07/13/2024 9.5  5.0 - 12.0 % Final    Eosinophil % 07/13/2024 1.3  0.3 - 6.2 % Final    Basophil % 07/13/2024 0.8  0.0 - 1.5 % Final    Immature Grans % 07/13/2024 0.6 (H)  0.0 - 0.5 % Final    Neutrophils, Absolute 07/13/2024 5.89  1.70 - 7.00 10*3/mm3 Final    Lymphocytes, Absolute 07/13/2024 2.87  0.70 - 3.10 10*3/mm3 Final    Monocytes, Absolute 07/13/2024 0.95 (H)  0.10 - 0.90 10*3/mm3 Final    Eosinophils, Absolute 07/13/2024 0.13  0.00 - 0.40 10*3/mm3 Final    Basophils, Absolute 07/13/2024 0.08  0.00 - 0.20 10*3/mm3 Final    Immature Grans, Absolute 07/13/2024 0.06 (H)  0.00 - 0.05 10*3/mm3 Final    nRBC 07/13/2024 0.0  0.0 - 0.2 /100 WBC Final    Color, UA 07/13/2024 Yellow  Yellow, Straw Final    Appearance, UA 07/13/2024  Clear  Clear Final    pH, UA 07/13/2024 6.5  5.0 - 8.0 Final    Specific [place], UA 07/13/2024 1.021  1.005 - 1.030 Final    Glucose, UA 07/13/2024 Negative  Negative Final    Ketones, UA 07/13/2024 Trace (A)  Negative Final    Bilirubin, UA 07/13/2024 Negative  Negative Final    Blood, UA 07/13/2024 Negative  Negative Final    Protein, UA 07/13/2024 Negative  Negative Final    Leuk Esterase, UA 07/13/2024 Negative  Negative Final    Nitrite, UA 07/13/2024 Negative  Negative Final    Urobilinogen, UA 07/13/2024 1.0 E.U./dL  0.2 - 1.0 E.U./dL Final    THC, Screen, Urine 07/13/2024 Negative  Negative Final    Phencyclidine (PCP), Urine 07/13/2024 Negative  Negative Final    Cocaine Screen, Urine 07/13/2024 Negative  Negative Final    Methamphetamine, Ur 07/13/2024 Negative  Negative Final    Opiate Screen 07/13/2024 Negative  Negative Final    Amphetamine Screen, Urine 07/13/2024 Negative  Negative Final    Benzodiazepine Screen, Urine 07/13/2024 Negative  Negative Final    Tricyclic Antidepressants Screen 07/13/2024 Negative  Negative Final    Methadone Screen, Urine 07/13/2024 Negative  Negative Final    Barbiturates Screen, Urine 07/13/2024 Negative  Negative Final    Oxycodone Screen, Urine 07/13/2024 Negative  Negative Final    Buprenorphine, Screen, Urine 07/13/2024 Negative  Negative Final    Fentanyl, Urine 07/13/2024 Negative  Negative Final    QT Interval 07/13/2024 412  ms Final    QTC Interval 07/13/2024 438  ms Final        Condition on Discharge:  improved    Vital Signs  Temp:  [97.3 °F (36.3 °C)-97.6 °F (36.4 °C)] 97.6 °F (36.4 °C)  Heart Rate:  [70-76] 70  Resp:  [18-20] 18  BP: (109-149)/(65-92) 109/65      Discharge Disposition:  Home or Self Care    Discharge Medications:     Discharge Medications        Continue These Medications        Instructions Start Date   busPIRone 15 MG tablet  Commonly known as: BUSPAR   15 mg, Oral, Nightly      busPIRone 15 MG tablet  Commonly known as: BUSPAR   15 mg,  Oral, 2 Times Daily PRN      escitalopram 10 MG tablet  Commonly known as: LEXAPRO   10 mg, Oral, Nightly      FLUoxetine 20 MG capsule  Commonly known as: PROzac   20 mg, Oral, Nightly      lamoTRIgine 25 MG tablet  Commonly known as: LaMICtal   25 mg, Oral, Nightly      lamoTRIgine 25 MG tablet  Commonly known as: LaMICtal   50 mg, Oral, Nightly      pantoprazole 40 MG EC tablet  Commonly known as: PROTONIX   40 mg, Oral, Nightly               Discharge Diet: Regular     Activity at Discharge: As tolerated     [place] [address]  [phone]     Tuesday, July 16, 2024 at 08:00am      Time: I spent  > 30  minutes on this discharge activity which included: face-to-face encounter with the patient, reviewing the data in the system, coordination of the care with the nursing staff as well as consultants, documentation, and entering orders.        Clinician:   Addy Vale MD  07/15/24  10:15 EDT

## 2024-07-15 NOTE — PLAN OF CARE
Goal Outcome Evaluation:  Plan of Care Reviewed With: patient  Patient Agreement with Plan of Care: agrees     Progress: improving  Outcome Evaluation: Patient was calm and cooperative during assessment.  He reported that he is eating alright and sleeping well.  He rated his anxiety 0/10 and depression 0/10. He denied SI/HI/AVH.  He spent the majority of the shift in his room.  Patient verbalized he feels like his condition is worsening because he is here.  Patient encouraged to speak with doctor in the morning about these concerns.

## 2024-07-15 NOTE — CASE MANAGEMENT/SOCIAL WORK
Patient Name:  Amos Griffin  YOB: 1985  MRN: [id number]  Admit Date:  7/13/2024    Patient is being discharged home on this date, family expected to provide transportation. Patient reports improvement in mood, he denies SI/HI/AVH. Patient is scheduled to continue outpatient services with [facility] for aftercare, next appointment is tomorrow. Therapist attempted to contact patient's wife at this time to review safety planning, no answer. Safety planning has been reviewed with patient. It has been recommended that patient not have access to firearms, weapons, sharp objects or medications. Patient states understanding and that his wife has already removed firearms from the home. Patient appears future oriented and motivated for treatment. Patient has been assisted with identifying risk factors which would indicate the need for higher level of care including thoughts to harm self or others and/or self-harming behavior. Encouraged patient to call [phone] or present to the nearest emergency room should any of these events occur. At time of discharge, patient adamantly and convincingly denies suicidal and homicidal ideation or perceptual disturbance. No other needs identified.     10:32  Spoke with patient's wife at this time. Provided update on patient, reviewed home medications and safety planning. She confirmed all firearms, knives and weapons have been removed from patient's home. Also recommended any additional sharp objects and medications be secured from patient, she voices understanding and is agreeable to assist patient with medications. Wife will provide transportation when patient is ready for pickup. She denies having any additional concerns.     Electronically signed by:  MATI Wilde  07/15/24 10:20 EDT

## 2024-07-16 NOTE — PAYOR COMM NOTE
"Amos Walden (39 y.o. Male)       Date of Birth   1985    Social Security Number       Address   [address] [place] [address]    Home Phone   [phone]    MRN   [id number]       Hinduism   None    Marital Status                               Admission Date   24    Admission Type   Emergency    Admitting Provider   Kahlil Hopson MD    Attending Provider       Department, Room/Bed   [facility] ADULT PSYCHIATRIC, 1015/1S       Discharge Date   7/15/2024    Discharge Disposition   Home or Self Care    Discharge Destination                                 Attending Provider: (none)   Allergies: Penicillins, Phenergan [Promethazine Hcl], Vistaril [Hydroxyzine Hcl]    Isolation: None   Infection: None   Code Status: Prior    Ht: 182.9 cm (72\")   Wt: 135 kg (297 lb 3.2 oz)    Admission Cmt: None   Principal Problem: Severe recurrent major depression without psychotic features [F33.2]                   Active Insurance as of 2024       Primary Coverage       Payor Plan Insurance Group Employer/Plan Group    [employer] BLUE CROSS [facility] [id number]       Payor Plan Address Payor Plan Phone Number Payor Plan Fax Number Effective Dates    [address] [phone]  2024 - None Entered    [place] [address]         Subscriber Name Subscriber Birth Date Member ID       AMOS WALDEN 1985 [id number]                     Emergency Contacts        (Rel.) Home Phone Work Phone Mobile Phone    Neli Walden (Spouse) [phone] -- [phone]          PLEASE ATTACH THE DISCHARGE INFORMATION INCLUDED TO AUTHORIZATION NUMBER [id number]    RETURN FAX NUMBER [phone]    PATIENT NAME:  AMOS WALDEN  :  1985    ADMISSION DATE:  2024  DISCHARGE DATE:  07/15/2024    FACILITY:  [facility]  NPI:  [id number]  TAX ID:  [id number]  ADDRESS:  [address] [place] [address] [place] [address]    ATTENDING MD AT " DISCHARGE:  DR. SE LIZARRAGA  NPI:  [id number]  ADDRESS:  SAME AS FACILITY    UR CONTACT:  SOSA WASSERMAN RN  PHONE:  [phone]  FAX:  [phone]      DIAGNOSIS:  F33.2 - SEVERE RECURRENT MAJOR DEPRESSION WITHOUT PSYCHOTIC FEATURES        Se Lizarraga MD   Physician  Psychiatry     Discharge Summary     Signed     Date of Service: 07/15/24 1015  Creation Time: 07/15/24 1015     Signed         :  1985  MRN:  [id number]  Visit Number:  [id number]        Date of Admission:2024   Date of Discharge:  7/15/2024     Discharge Diagnosis:  Principal Problem:    Severe recurrent major depression without psychotic features  Active Problems:    Gastroesophageal reflux disease without esophagitis    Suicidal ideation    Adjustment disorder with mixed anxiety and depressed mood           Admission Diagnosis:  Depression with suicidal ideation [F32.A, R45.851]        HPI  Amos Griffin is a 39 y.o. male who was admitted on 2024 and evaluated on 2024 with complaints of suicidal ideation.   For details please see H&P dated 7/15/24.      Hospital Course  Patient is a 39 y.o. male presented with suicidal ideations after an argument with his wife. He was admitted to the adult psych unit for safety, further evaluation and treatment. The patient is currently in outpatient treatment for depression and mood symptoms. He was continued on fluoxetine and he is being tapered off of it as an outpatient and will be switched to escitalopram. Initially the treatment plan was to increase the fluoxetine and add aripiprazole but patient was continued on his home regimen and his mood appeared to improve. He was upset because of an argument with his wife but reported that he and his wife wanted to make things better and was hopeful about things working out. The day of discharge he reported feeling better and denied any active thoughts of harm to self or others. Stated that he and his wife are working on rapprochement  "and he would feel better if he went home and also wanted to go back to work.        Mental Status Exam upon discharge:   Mood \"good\"   Affect-congruent, appropriate, stable  Thought Content-goal directed, no delusional material present  Thought process-linear, organized.  Suicidality: No SI  Homicidality: No HI  Perception: No AH/VH     Procedures Performed        Consults:   Consults         No orders found from 6/14/2024 to 7/14/2024.                Pertinent Test Results:           Admission on 07/13/2024   Component Date Value Ref Range Status    Hepatitis B Surface Ag 07/13/2024 Non-Reactive  Non-Reactive Final    Hep A IgM 07/13/2024 Non-Reactive  Non-Reactive Final    Hep B C IgM 07/13/2024 Non-Reactive  Non-Reactive Final    Hepatitis C Ab 07/13/2024 Non-Reactive  Non-Reactive Final    QT Interval 07/13/2024 412  ms Final    QTC Interval 07/13/2024 431  ms Final    Glucose 07/15/2024 96  65 - 99 mg/dL Final    BUN 07/15/2024 12  6 - 20 mg/dL Final    Creatinine 07/15/2024 1.15  0.76 - 1.27 mg/dL Final    Sodium 07/15/2024 141  136 - 145 mmol/L Final    Potassium 07/15/2024 3.4 (L)  3.5 - 5.2 mmol/L Final     Slight hemolysis detected by analyzer. Result may be falsely elevated.    Chloride 07/15/2024 102  98 - 107 mmol/L Final    CO2 07/15/2024 26.7  22.0 - 29.0 mmol/L Final    Calcium 07/15/2024 9.3  8.6 - 10.5 mg/dL Final    BUN/Creatinine Ratio 07/15/2024 10.4  7.0 - 25.0 Final    Anion Gap 07/15/2024 12.3  5.0 - 15.0 mmol/L Final    eGFR 07/15/2024 83.0  >60.0 mL/min/1.73 Final   Admission on 07/13/2024, Discharged on 07/13/2024   Component Date Value Ref Range Status    Glucose 07/13/2024 110 (H)  65 - 99 mg/dL Final    BUN 07/13/2024 8  6 - 20 mg/dL Final    Creatinine 07/13/2024 0.93  0.76 - 1.27 mg/dL Final    Sodium 07/13/2024 142  136 - 145 mmol/L Final    Potassium 07/13/2024 3.3 (L)  3.5 - 5.2 mmol/L Final     Slight hemolysis detected by analyzer. Result may be falsely elevated.    Chloride " 07/13/2024 105  98 - 107 mmol/L Final    CO2 07/13/2024 23.0  22.0 - 29.0 mmol/L Final    Calcium 07/13/2024 9.2  8.6 - 10.5 mg/dL Final    Total Protein 07/13/2024 7.3  6.0 - 8.5 g/dL Final    Albumin 07/13/2024 4.1  3.5 - 5.2 g/dL Final    ALT (SGPT) 07/13/2024 47 (H)  1 - 41 U/L Final    AST (SGOT) 07/13/2024 34  1 - 40 U/L Final    Alkaline Phosphatase 07/13/2024 97  39 - 117 U/L Final    Total Bilirubin 07/13/2024 0.6  0.0 - 1.2 mg/dL Final    Globulin 07/13/2024 3.2  gm/dL Final    A/G Ratio 07/13/2024 1.3  g/dL Final    BUN/Creatinine Ratio 07/13/2024 8.6  7.0 - 25.0 Final    Anion Gap 07/13/2024 14.0  5.0 - 15.0 mmol/L Final    eGFR 07/13/2024 107.1  >60.0 mL/min/1.73 Final    Protime 07/13/2024 13.7  12.1 - 14.7 Seconds Final    INR 07/13/2024 1.03  0.90 - 1.10 Final    Magnesium 07/13/2024 2.4  1.6 - 2.6 mg/dL Final    Creatine Kinase 07/13/2024 304 (H)  20 - 200 U/L Final    Ethanol 07/13/2024 36 (H)  0 - 10 mg/dL Final    Ethanol % 07/13/2024 0.036  % Final    WBC 07/13/2024 9.98  3.40 - 10.80 10*3/mm3 Final    RBC 07/13/2024 4.98  4.14 - 5.80 10*6/mm3 Final    Hemoglobin 07/13/2024 15.5  13.0 - 17.7 g/dL Final    Hematocrit 07/13/2024 45.2  37.5 - 51.0 % Final    MCV 07/13/2024 90.8  79.0 - 97.0 fL Final    MCH 07/13/2024 31.1  26.6 - 33.0 pg Final    MCHC 07/13/2024 34.3  31.5 - 35.7 g/dL Final    RDW 07/13/2024 12.5  12.3 - 15.4 % Final    RDW-SD 07/13/2024 40.2  37.0 - 54.0 fl Final    MPV 07/13/2024 10.8  6.0 - 12.0 fL Final    Platelets 07/13/2024 225  140 - 450 10*3/mm3 Final    Neutrophil % 07/13/2024 59.0  42.7 - 76.0 % Final    Lymphocyte % 07/13/2024 28.8  19.6 - 45.3 % Final    Monocyte % 07/13/2024 9.5  5.0 - 12.0 % Final    Eosinophil % 07/13/2024 1.3  0.3 - 6.2 % Final    Basophil % 07/13/2024 0.8  0.0 - 1.5 % Final    Immature Grans % 07/13/2024 0.6 (H)  0.0 - 0.5 % Final    Neutrophils, Absolute 07/13/2024 5.89  1.70 - 7.00 10*3/mm3 Final    Lymphocytes, Absolute 07/13/2024 2.87  0.70 -  3.10 10*3/mm3 Final    Monocytes, Absolute 07/13/2024 0.95 (H)  0.10 - 0.90 10*3/mm3 Final    Eosinophils, Absolute 07/13/2024 0.13  0.00 - 0.40 10*3/mm3 Final    Basophils, Absolute 07/13/2024 0.08  0.00 - 0.20 10*3/mm3 Final    Immature Grans, Absolute 07/13/2024 0.06 (H)  0.00 - 0.05 10*3/mm3 Final    nRBC 07/13/2024 0.0  0.0 - 0.2 /100 WBC Final    Color, UA 07/13/2024 Yellow  Yellow, Straw Final    Appearance, UA 07/13/2024 Clear  Clear Final    pH, UA 07/13/2024 6.5  5.0 - 8.0 Final    Specific [place], UA 07/13/2024 1.021  1.005 - 1.030 Final    Glucose, UA 07/13/2024 Negative  Negative Final    Ketones, UA 07/13/2024 Trace (A)  Negative Final    Bilirubin, UA 07/13/2024 Negative  Negative Final    Blood, UA 07/13/2024 Negative  Negative Final    Protein, UA 07/13/2024 Negative  Negative Final    Leuk Esterase, UA 07/13/2024 Negative  Negative Final    Nitrite, UA 07/13/2024 Negative  Negative Final    Urobilinogen, UA 07/13/2024 1.0 E.U./dL  0.2 - 1.0 E.U./dL Final    THC, Screen, Urine 07/13/2024 Negative  Negative Final    Phencyclidine (PCP), Urine 07/13/2024 Negative  Negative Final    Cocaine Screen, Urine 07/13/2024 Negative  Negative Final    Methamphetamine, Ur 07/13/2024 Negative  Negative Final    Opiate Screen 07/13/2024 Negative  Negative Final    Amphetamine Screen, Urine 07/13/2024 Negative  Negative Final    Benzodiazepine Screen, Urine 07/13/2024 Negative  Negative Final    Tricyclic Antidepressants Screen 07/13/2024 Negative  Negative Final    Methadone Screen, Urine 07/13/2024 Negative  Negative Final    Barbiturates Screen, Urine 07/13/2024 Negative  Negative Final    Oxycodone Screen, Urine 07/13/2024 Negative  Negative Final    Buprenorphine, Screen, Urine 07/13/2024 Negative  Negative Final    Fentanyl, Urine 07/13/2024 Negative  Negative Final    QT Interval 07/13/2024 412  ms Final    QTC Interval 07/13/2024 438  ms Final         Condition on Discharge:  improved     Vital  Signs  Temp:  [97.3 °F (36.3 °C)-97.6 °F (36.4 °C)] 97.6 °F (36.4 °C)  Heart Rate:  [70-76] 70  Resp:  [18-20] 18  BP: (109-149)/(65-92) 109/65        Discharge Disposition:  Home or Self Care     Discharge Medications:      Discharge Medications          Continue These Medications         Instructions Start Date   busPIRone 15 MG tablet  Commonly known as: BUSPAR    15 mg, Oral, Nightly        busPIRone 15 MG tablet  Commonly known as: BUSPAR    15 mg, Oral, 2 Times Daily PRN        escitalopram 10 MG tablet  Commonly known as: LEXAPRO    10 mg, Oral, Nightly        FLUoxetine 20 MG capsule  Commonly known as: PROzac    20 mg, Oral, Nightly        lamoTRIgine 25 MG tablet  Commonly known as: LaMICtal    25 mg, Oral, Nightly        lamoTRIgine 25 MG tablet  Commonly known as: LaMICtal    50 mg, Oral, Nightly        pantoprazole 40 MG EC tablet  Commonly known as: PROTONIX    40 mg, Oral, Nightly                     Discharge Diet: Regular      Activity at Discharge: As tolerated      [place] [address]  [phone]     Tuesday, July 16, 2024 at 08:00am        Time: I spent  > 30  minutes on this discharge activity which included: face-to-face encounter with the patient, reviewing the data in the system, coordination of the care with the nursing staff as well as consultants, documentation, and entering orders.          Clinician:   Addy Vale MD  07/15/24  10:15 EDT              Routing History

## 2024-09-02 ENCOUNTER — HOSPITAL ENCOUNTER (EMERGENCY)
Facility: HOSPITAL | Age: 39
Discharge: HOME | End: 2024-09-02
Attending: EMERGENCY MEDICINE
Payer: COMMERCIAL

## 2024-09-02 VITALS
SYSTOLIC BLOOD PRESSURE: 163 MMHG | BODY MASS INDEX: 40.63 KG/M2 | DIASTOLIC BLOOD PRESSURE: 90 MMHG | WEIGHT: 300 LBS | HEART RATE: 92 BPM | RESPIRATION RATE: 17 BRPM | HEIGHT: 72 IN | OXYGEN SATURATION: 100 % | TEMPERATURE: 97.9 F

## 2024-09-02 DIAGNOSIS — S51.812A LACERATION OF LEFT FOREARM, INITIAL ENCOUNTER: Primary | ICD-10-CM

## 2024-09-02 PROCEDURE — 25010000002 TETANUS-DIPHTH-ACELL PERTUSSIS 5-2.5-18.5 LF-MCG/0.5 SUSPENSION PREFILLED SYRINGE: Performed by: PHYSICIAN ASSISTANT

## 2024-09-02 PROCEDURE — 90471 IMMUNIZATION ADMIN: CPT | Performed by: PHYSICIAN ASSISTANT

## 2024-09-02 PROCEDURE — 99282 EMERGENCY DEPT VISIT SF MDM: CPT

## 2024-09-02 PROCEDURE — 90715 TDAP VACCINE 7 YRS/> IM: CPT | Performed by: PHYSICIAN ASSISTANT

## 2024-09-02 RX ORDER — LIDOCAINE HYDROCHLORIDE 10 MG/ML
10 INJECTION, SOLUTION EPIDURAL; INFILTRATION; INTRACAUDAL; PERINEURAL ONCE
Status: COMPLETED | OUTPATIENT
Start: 2024-09-02 | End: 2024-09-02

## 2024-09-02 RX ADMIN — LIDOCAINE HYDROCHLORIDE 10 ML: 10 INJECTION, SOLUTION EPIDURAL; INFILTRATION; INTRACAUDAL; PERINEURAL at 13:04

## 2024-09-02 RX ADMIN — TETANUS TOXOID, REDUCED DIPHTHERIA TOXOID AND ACELLULAR PERTUSSIS VACCINE, ADSORBED 0.5 ML: 5; 2.5; 8; 8; 2.5 SUSPENSION INTRAMUSCULAR at 13:04

## 2024-09-02 NOTE — ED PROVIDER NOTES
Subjective   History of Present Illness  40 yo male pt with past medical hx of GERD presents to the ED with complaints of forearm laceration.  Patient states that he accidentally cut his left arm with a knife.  Tetanus shot is not up-to-date.  Bleeding is controlled.    History provided by:  Patient   used: No        Review of Systems   Constitutional: Negative.    HENT: Negative.     Eyes: Negative.    Respiratory: Negative.     Cardiovascular: Negative.    Gastrointestinal: Negative.    Endocrine: Negative.    Genitourinary: Negative.    Musculoskeletal: Negative.    Skin:  Positive for wound.   Allergic/Immunologic: Negative.    Neurological: Negative.    Hematological: Negative.    Psychiatric/Behavioral: Negative.     All other systems reviewed and are negative.      Past Medical History:   Diagnosis Date    Chest pain     COVID-19     GERD (gastroesophageal reflux disease)     Mood disorder        Allergies   Allergen Reactions    Penicillins     Phenergan [Promethazine Hcl]     Vistaril [Hydroxyzine Hcl]        Past Surgical History:   Procedure Laterality Date    APPENDECTOMY      CHOLECYSTECTOMY      COLONOSCOPY N/A 9/26/2017    Procedure: COLONOSCOPY CPT CODE: 08932;  Surgeon: Clinton Bernal III, MD;  Location: [facility] OR;  Service:     ENDOSCOPY N/A 9/26/2017    Procedure: ESOPHAGOGASTRODUODENOSCOPY WITH DILATATION CPT CODE: 00378;  Surgeon: Clinton Bernal III, MD;  Location: [facility] OR;  Service:     KNEE SURGERY         Family History   Problem Relation Age of Onset    No Known Problems Mother     No Known Problems Father     No Known Problems Sister     No Known Problems Brother     No Known Problems Daughter     No Known Problems Son     Heart disease Maternal Grandmother     No Known Problems Maternal Grandfather     No Known Problems Paternal Grandmother     No Known Problems Paternal Grandfather     No Known Problems Cousin     Rheum arthritis Neg Hx     Osteoarthritis  Neg Hx     Asthma Neg Hx     Diabetes Neg Hx     Heart failure Neg Hx     Hyperlipidemia Neg Hx     Hypertension Neg Hx     Migraines Neg Hx     Rashes / Skin problems Neg Hx     Seizures Neg Hx     Stroke Neg Hx     Thyroid disease Neg Hx        Social History     Socioeconomic History    Marital status:    Tobacco Use    Smoking status: Former     Current packs/day: 0.00     Average packs/day: 1 pack/day for 6.0 years (6.0 ttl pk-yrs)     Types: Cigarettes     Start date: 2007     Quit date: 2013     Years since quittin.4     Passive exposure: Never    Smokeless tobacco: Never   Vaping Use    Vaping status: Never Used   Substance and Sexual Activity    Alcohol use: Yes     Comment: 6 pack every couple do days    Drug use: No    Sexual activity: Yes     Partners: Female           Objective   Physical Exam  Vitals and nursing note reviewed.   Constitutional:       Appearance: Normal appearance. He is normal weight.   HENT:      Head: Normocephalic and atraumatic.      Right Ear: External ear normal.      Left Ear: External ear normal.      Nose: Nose normal.      Mouth/Throat:      Mouth: Mucous membranes are moist.      Pharynx: Oropharynx is clear.   Eyes:      Extraocular Movements: Extraocular movements intact.      Conjunctiva/sclera: Conjunctivae normal.      Pupils: Pupils are equal, round, and reactive to light.   Cardiovascular:      Rate and Rhythm: Normal rate and regular rhythm.      Pulses: Normal pulses.      Heart sounds: Normal heart sounds.   Pulmonary:      Effort: Pulmonary effort is normal.      Breath sounds: Normal breath sounds.   Abdominal:      General: Abdomen is flat. Bowel sounds are normal.      Palpations: Abdomen is soft.   Musculoskeletal:         General: Normal range of motion.      Cervical back: Normal range of motion and neck supple.   Skin:     General: Skin is warm and dry.      Capillary Refill: Capillary refill takes less than 2 seconds.      Comments: 5  cm laceration. Full ROM. No swelling or deformity. Bleeding controlled.  N/V intact.    Neurological:      General: No focal deficit present.      Mental Status: He is alert and oriented to person, place, and time. Mental status is at baseline.   Psychiatric:         Mood and Affect: Mood normal.         Behavior: Behavior normal.         Thought Content: Thought content normal.         Judgment: Judgment normal.         Laceration Repair    Date/Time: 9/2/2024 2:16 PM    Performed by: Annmarie Nolan PA  Authorized by: Avelino Orona MD    Consent:     Consent obtained:  Verbal    Consent given by:  Patient    Risks, benefits, and alternatives were discussed: yes      Risks discussed:  Need for additional repair, pain, infection, retained foreign body, tendon damage, vascular damage, poor cosmetic result, nerve damage and poor wound healing    Alternatives discussed:  No treatment and delayed treatment  Universal protocol:     Procedure explained and questions answered to patient or proxy's satisfaction: yes      Relevant documents present and verified: yes      Test results available: yes      Imaging studies available: yes      Required blood products, implants, devices, and special equipment available: yes      Site/side marked: yes      Immediately prior to procedure, a time out was called: yes      Patient identity confirmed:  Verbally with patient, hospital-assigned identification number, arm band and provided demographic data  Anesthesia:     Anesthesia method:  Local infiltration    Local anesthetic:  Lidocaine 1% w/o epi  Laceration details:     Location:  Shoulder/arm    Shoulder/arm location:  L lower arm    Length (cm):  5  Pre-procedure details:     Preparation:  Patient was prepped and draped in usual sterile fashion  Treatment:     Area cleansed with:  Chlorhexidine and povidone-iodine    Amount of cleaning:  Standard    Irrigation solution:  Sterile water  Skin repair:     Repair  method:  Sutures    Suture size:  4-0    Suture material:  Nylon    Suture technique:  Simple interrupted    Number of sutures:  14  Approximation:     Approximation:  Close  Repair type:     Repair type:  Simple  Post-procedure details:     Dressing:  Bulky dressing    Procedure completion:  Tolerated well, no immediate complications             ED Course                                             Medical Decision Making  40 yo male pt with past medical hx of GERD presents to the ED with complaints of forearm laceration.  Patient states that he accidentally cut his left arm with a knife.  Tetanus shot is not up-to-date.  Bleeding is controlled.  Tolerated wound closure well.  Patient received 14 sutures.  I discussed wound care with the patient.  I discussed symptoms and red flags that would warrant immediate return to the emergency room.    Problems Addressed:  Laceration of left forearm, initial encounter: complicated acute illness or injury    Risk  Prescription drug management.        Final diagnoses:   Laceration of left forearm, initial encounter       ED Disposition  ED Disposition       ED Disposition   Discharge    Condition   Stable    Comment   --               Gladis Guerrero, KARI  [address] [facility] [address]  [phone]    Schedule an appointment as soon as possible for a visit in 1 week  For suture removal         Medication List      No changes were made to your prescriptions during this visit.            Annmarie Nolan PA  09/02/24 [id number]

## 2024-09-02 NOTE — DISCHARGE INSTRUCTIONS
Do not submerge sutures in water. Keep area clean, dry, and covered. You may use neosporin.  Follow up with PCP in 1 week for suture removal. Monitor for symptoms of infections including but not limited to redness, swelling, warmth, drainage, streaking or fevers.

## (undated) DEVICE — CONN Y IRR DISP 1P/U

## (undated) DEVICE — Device: Brand: ENDOGATOR

## (undated) DEVICE — FRCP BX RADJAW4 NDL 2.8 240CM LG OG BX40

## (undated) DEVICE — Device

## (undated) DEVICE — SYR LUERLOK 30CC

## (undated) DEVICE — TRAP,MUCUS SPECIMEN,40CC: Brand: MEDLINE

## (undated) DEVICE — Device: Brand: DEFENDO AIR/WATER/SUCTION AND BIOPSY VALVE

## (undated) DEVICE — SINGLE PORT MANIFOLD: Brand: NEPTUNE 2

## (undated) DEVICE — THE BITE BLOCK MAXI, LATEX FREE STRAP IS USED TO PROTECT THE ENDOSCOPE INSERTION TUBE FROM BEING BITTEN BY THE PATIENT.